# Patient Record
Sex: FEMALE | Race: ASIAN | NOT HISPANIC OR LATINO | Employment: FULL TIME | URBAN - METROPOLITAN AREA
[De-identification: names, ages, dates, MRNs, and addresses within clinical notes are randomized per-mention and may not be internally consistent; named-entity substitution may affect disease eponyms.]

---

## 2017-03-08 ENCOUNTER — TELEPHONE (OUTPATIENT)
Dept: INTERNAL MEDICINE | Facility: CLINIC | Age: 57
End: 2017-03-08

## 2017-03-08 DIAGNOSIS — Z13.220 SCREENING, LIPID: ICD-10-CM

## 2017-03-08 DIAGNOSIS — Z00.00 ANNUAL PHYSICAL EXAM: Primary | ICD-10-CM

## 2017-03-08 DIAGNOSIS — Z13.21 ENCOUNTER FOR VITAMIN DEFICIENCY SCREENING: ICD-10-CM

## 2017-03-08 NOTE — TELEPHONE ENCOUNTER
----- Message from Irina Rincon sent at 3/8/2017  2:49 PM CST -----  Contact: self [phone]  Doctor appointment and lab have been scheduled.  Please link lab orders to the lab appointment.  Date of doctor appointment:  05/09  Physical or EP:  Physical  Date of lab appointment: 05/03   Comments:

## 2017-05-02 ENCOUNTER — LAB VISIT (OUTPATIENT)
Dept: LAB | Facility: HOSPITAL | Age: 57
End: 2017-05-02
Attending: FAMILY MEDICINE
Payer: COMMERCIAL

## 2017-05-02 DIAGNOSIS — Z13.220 SCREENING, LIPID: ICD-10-CM

## 2017-05-02 DIAGNOSIS — Z00.00 ANNUAL PHYSICAL EXAM: ICD-10-CM

## 2017-05-02 LAB
ALBUMIN SERPL BCP-MCNC: 4 G/DL
ALP SERPL-CCNC: 58 U/L
ALT SERPL W/O P-5'-P-CCNC: 21 U/L
ANION GAP SERPL CALC-SCNC: 9 MMOL/L
AST SERPL-CCNC: 24 U/L
BASOPHILS # BLD AUTO: 0.04 K/UL
BASOPHILS NFR BLD: 0.8 %
BILIRUB SERPL-MCNC: 0.7 MG/DL
BUN SERPL-MCNC: 20 MG/DL
CALCIUM SERPL-MCNC: 9.3 MG/DL
CHLORIDE SERPL-SCNC: 105 MMOL/L
CHOLEST/HDLC SERPL: 3.4 {RATIO}
CO2 SERPL-SCNC: 26 MMOL/L
CREAT SERPL-MCNC: 0.8 MG/DL
DIFFERENTIAL METHOD: ABNORMAL
EOSINOPHIL # BLD AUTO: 0.3 K/UL
EOSINOPHIL NFR BLD: 5.2 %
ERYTHROCYTE [DISTWIDTH] IN BLOOD BY AUTOMATED COUNT: 12.8 %
EST. GFR  (AFRICAN AMERICAN): >60 ML/MIN/1.73 M^2
EST. GFR  (NON AFRICAN AMERICAN): >60 ML/MIN/1.73 M^2
GLUCOSE SERPL-MCNC: 93 MG/DL
HCT VFR BLD AUTO: 40.2 %
HDL/CHOLESTEROL RATIO: 29.3 %
HDLC SERPL-MCNC: 184 MG/DL
HDLC SERPL-MCNC: 54 MG/DL
HGB BLD-MCNC: 13.6 G/DL
LDLC SERPL CALC-MCNC: 97.2 MG/DL
LYMPHOCYTES # BLD AUTO: 2.5 K/UL
LYMPHOCYTES NFR BLD: 49.5 %
MCH RBC QN AUTO: 31.6 PG
MCHC RBC AUTO-ENTMCNC: 33.8 %
MCV RBC AUTO: 93 FL
MONOCYTES # BLD AUTO: 0.3 K/UL
MONOCYTES NFR BLD: 6.4 %
NEUTROPHILS # BLD AUTO: 1.9 K/UL
NEUTROPHILS NFR BLD: 38.1 %
NONHDLC SERPL-MCNC: 130 MG/DL
PLATELET # BLD AUTO: 241 K/UL
PMV BLD AUTO: 9.4 FL
POTASSIUM SERPL-SCNC: 4.1 MMOL/L
PROT SERPL-MCNC: 7.6 G/DL
RBC # BLD AUTO: 4.31 M/UL
SODIUM SERPL-SCNC: 140 MMOL/L
T4 FREE SERPL-MCNC: 0.93 NG/DL
TRIGL SERPL-MCNC: 164 MG/DL
TSH SERPL DL<=0.005 MIU/L-ACNC: 4.29 UIU/ML
WBC # BLD AUTO: 5.03 K/UL

## 2017-05-02 PROCEDURE — 84443 ASSAY THYROID STIM HORMONE: CPT

## 2017-05-02 PROCEDURE — 84439 ASSAY OF FREE THYROXINE: CPT

## 2017-05-02 PROCEDURE — 80053 COMPREHEN METABOLIC PANEL: CPT

## 2017-05-02 PROCEDURE — 85025 COMPLETE CBC W/AUTO DIFF WBC: CPT

## 2017-05-02 PROCEDURE — 80061 LIPID PANEL: CPT

## 2017-05-02 PROCEDURE — 36415 COLL VENOUS BLD VENIPUNCTURE: CPT

## 2017-05-09 ENCOUNTER — LAB VISIT (OUTPATIENT)
Dept: LAB | Facility: HOSPITAL | Age: 57
End: 2017-05-09
Attending: FAMILY MEDICINE
Payer: COMMERCIAL

## 2017-05-09 ENCOUNTER — OFFICE VISIT (OUTPATIENT)
Dept: INTERNAL MEDICINE | Facility: CLINIC | Age: 57
End: 2017-05-09
Payer: COMMERCIAL

## 2017-05-09 VITALS
HEART RATE: 69 BPM | DIASTOLIC BLOOD PRESSURE: 58 MMHG | HEIGHT: 58 IN | BODY MASS INDEX: 20.15 KG/M2 | OXYGEN SATURATION: 99 % | SYSTOLIC BLOOD PRESSURE: 102 MMHG | WEIGHT: 96 LBS

## 2017-05-09 DIAGNOSIS — R25.2 CRAMP OF BOTH LOWER EXTREMITIES: ICD-10-CM

## 2017-05-09 DIAGNOSIS — Z00.00 ANNUAL PHYSICAL EXAM: Primary | ICD-10-CM

## 2017-05-09 DIAGNOSIS — R09.82 POST-NASAL DRIP: ICD-10-CM

## 2017-05-09 DIAGNOSIS — R79.89 ELEVATED TSH: ICD-10-CM

## 2017-05-09 LAB
FERRITIN SERPL-MCNC: 237 NG/ML
FOLATE SERPL-MCNC: 14.5 NG/ML
IRON SERPL-MCNC: 144 UG/DL
RETICS/RBC NFR AUTO: 1.3 %
SATURATED IRON: 36 %
THYROPEROXIDASE IGG SERPL-ACNC: <6 IU/ML
TOTAL IRON BINDING CAPACITY: 400 UG/DL
TRANSFERRIN SERPL-MCNC: 270 MG/DL
TSH SERPL DL<=0.005 MIU/L-ACNC: 3.42 UIU/ML
VIT B12 SERPL-MCNC: 816 PG/ML

## 2017-05-09 PROCEDURE — 82728 ASSAY OF FERRITIN: CPT

## 2017-05-09 PROCEDURE — 85045 AUTOMATED RETICULOCYTE COUNT: CPT

## 2017-05-09 PROCEDURE — 86376 MICROSOMAL ANTIBODY EACH: CPT

## 2017-05-09 PROCEDURE — 36415 COLL VENOUS BLD VENIPUNCTURE: CPT

## 2017-05-09 PROCEDURE — 3074F SYST BP LT 130 MM HG: CPT | Mod: S$GLB,,, | Performed by: FAMILY MEDICINE

## 2017-05-09 PROCEDURE — 82746 ASSAY OF FOLIC ACID SERUM: CPT

## 2017-05-09 PROCEDURE — 99999 PR PBB SHADOW E&M-EST. PATIENT-LVL III: CPT | Mod: PBBFAC,,, | Performed by: FAMILY MEDICINE

## 2017-05-09 PROCEDURE — 83520 IMMUNOASSAY QUANT NOS NONAB: CPT

## 2017-05-09 PROCEDURE — 84443 ASSAY THYROID STIM HORMONE: CPT

## 2017-05-09 PROCEDURE — 82607 VITAMIN B-12: CPT

## 2017-05-09 PROCEDURE — 99396 PREV VISIT EST AGE 40-64: CPT | Mod: S$GLB,,, | Performed by: FAMILY MEDICINE

## 2017-05-09 PROCEDURE — 3078F DIAST BP <80 MM HG: CPT | Mod: S$GLB,,, | Performed by: FAMILY MEDICINE

## 2017-05-09 PROCEDURE — 83540 ASSAY OF IRON: CPT

## 2017-05-09 RX ORDER — FLUTICASONE PROPIONATE 50 MCG
1 SPRAY, SUSPENSION (ML) NASAL DAILY
Qty: 16 G | Refills: 3 | Status: SHIPPED | OUTPATIENT
Start: 2017-05-09 | End: 2017-06-26

## 2017-05-09 NOTE — PATIENT INSTRUCTIONS
Compression Socks non-prescription, mine are from [facility] pharmacy for 8 dollars a pair    Flonase    Benign Paroxysmal Positional Vertigo  Benign paroxysmal positional vertigo (BPPV) is a problem with the inner ear. The inner ear contains the vestibular system. This system is what helps you keep your balance. BPPV causes a feeling of spinning. It is a common problem of the vestibular system.  Understanding the vestibular system  The vestibular system of the ear is made up of very tiny parts. They include the utricle, saccule, and semicircular canals. The utricle is a tiny organ that contains calcium crystals. In some people, the crystals can move into the semicircular canals. When this happens, the system no longer works as it should. This causes BPPV. Benign means it is not life-threatening. Paroxysmal means it happens suddenly. Positional means that it happens when you move your head. Vertigo is a feeling of spinning.  What causes BPPV?  Causes include injury to your head or neck. Other problems with the vestibular system may cause BPPV. In many people, the cause of BPPV is not known.  Symptoms of BPPV  You many have repeated feelings of spinning (vertigo). The vertigo usually lasts less than 1 minute. Some movements, suchas rolling over in bed, can bring on vertigo.  Diagnosing BPPV  Your primary health care provider may diagnose and treat your BPPV. Or you may see an ear, nose, and throat doctor (otolaryngologist). In some cases, you may see a nervous system doctor (neurologist).  The health care provider will ask about your symptoms and your medical history. He or she will examine you. You may have hearing and balance tests. As part of the exam, your health care provider may have you move your head and body in certain ways. If you have BPPV, the movements can bring on vertigo. Your provider will also look for abnormal movements of your eyes. You may have other tests to check your vestibular or nervous  systems.  Treatment for BPPV  Your health care provider may try to move the calcium crystals. This is done by having you move your head and neck in certain ways. This treatment is safe and often works well. You may also be told to do these movements at home. You may still have vertigo for a few weeks. Your health care provider will recheck your symptoms, usually in about a month. Special physical therapy may also be part of treatment.  In rare cases surgery may be needed for BPPV that does not go away.     When to call the health care provider  Call your health care provider right away if you have any of these:  · Symptoms that do not go away with treatment  · Symptoms that get worse  · New symptoms   Date Last Reviewed: 3/19/2015  © 5361-1146 [employer]. [address] [place] [address], [place] [address]. All rights reserved. This information is not intended as a substitute for professional medical care. Always follow your healthcare professional's instructions.

## 2017-05-09 NOTE — PROGRESS NOTES
"Subjective:       Patient ID: Mercedes Perez is a 57 y.o. female.    Chief Complaint: Annual Exam    HPI 58 y/o female here for here for her annual exam  She reports lower leg pain in both legs on and off for the past few months, cannot characterize the pain, worse in the day time when she is standing at work, she works on an assembly line, it feels better when she is lying down, she had not tried anything.  She reports she for the past few months she has had trouble falling and staying asleep, she feels more tired, she notes that when she lays down or turns in bed she gets "dizzy" lasts a few seconds and resolves, denies feeling bad when it is happening, denies f/n/v/d/constipation/cp/palpitations/sob, she reports her urinary sx have improved, she has tried Melatonin and it helps sometimes  Eating regularly, she does exercise less lately, she is doing 30 min twice a week.   Eye exam due  Dental due  Vaccines: not sure if she had tdap, will get at her work if needed  GYN: mmg due in October  Scope due in 2020  dexa done at work was normal last year    Review of Systems   Constitutional: Negative for activity change, appetite change, fatigue and fever.   Respiratory: Negative for cough and shortness of breath.    Cardiovascular: Negative for chest pain, palpitations and leg swelling.   Gastrointestinal: Negative for constipation, diarrhea, nausea and vomiting.   Genitourinary: Negative for difficulty urinating and dysuria.   Skin: Negative for rash.   Neurological: Negative for dizziness and light-headedness.   Psychiatric/Behavioral: Positive for sleep disturbance.       Objective:      BP (!) 102/58  Pulse 69  Ht 4' 10" (1.473 m)  Wt 43.6 kg (96 lb 0.2 oz)  SpO2 99%  BMI 20.07 kg/m2  Physical Exam   Constitutional: She appears well-developed and well-nourished.   HENT:   Head: Normocephalic and atraumatic.   Mouth/Throat: No oropharyngeal exudate.   Neck: Normal range of motion. Neck supple. No thyromegaly " present.   Cardiovascular: Normal rate, regular rhythm and normal heart sounds.    Pulmonary/Chest: Effort normal and breath sounds normal. No respiratory distress.   Abdominal: Soft. Bowel sounds are normal. She exhibits no distension. There is no tenderness.   Musculoskeletal: She exhibits no edema.   Lymphadenopathy:     She has no cervical adenopathy.   Neurological: She is alert.   Skin: Skin is warm and dry.   Psychiatric: She has a normal mood and affect.   Nursing note and vitals reviewed.      Assessment:       1. Annual physical exam    2. Cramp of both lower extremities    3. Elevated TSH    4. Post-nasal drip        Plan:   [facility] was seen today for annual exam.    Diagnoses and all orders for this visit:    Annual physical exam    Cramp of both lower extremities  -     Iron and TIBC; Future  -     Ferritin; Future  -     Reticulocytes; Future  -     Vitamin B12; Future  -     Folate; Future    Elevated TSH  -     TSH; Future  -     THYROID PEROXIDASE ANTIBODY; Future  -     THYROTROPIN RECEPTOR ANTIBODY; Future    Post-nasal drip  -     fluticasone (FLONASE) 50 mcg/actuation nasal spray; 1 spray by Each Nare route once daily.

## 2017-05-09 NOTE — MR AVS SNAPSHOT
[facility]  [address]  [place] [address]  Phone: [phone]  Fax: [phone]                  Mercedes Perez   2017 8:30 AM   Office Visit    Description:  Female : 1960   Provider:  Natasha Deng MD   Department:  [facility]           Reason for Visit     Annual Exam           Diagnoses this Visit        Comments    Annual physical exam    -  Primary     Cramp of both lower extremities         Elevated TSH         Post-nasal drip                To Do List           Goals (5 Years of Data)     None      Follow-Up and Disposition     Return in about 1 year (around 2018), or if symptoms worsen or fail to improve.    Follow-up and Disposition History       These Medications        Disp Refills Start End    fluticasone (FLONASE) 50 mcg/actuation nasal spray 16 g 3 2017     1 spray by Each Nare route once daily. - Each Nare    Pharmacy: [facility] [address] [place] [address].  #: [phone]         [facility]     [facility] On [facility] Nurse Care Line -  Assistance  Unless otherwise directed by your provider, please contact [facility], our nurse care line that is available for  assistance.     Registered nurses in the [facility] provide: appointment scheduling, clinical advisement, health education, and other advisory services.  Call: [phone] (toll free)               Medications           Message regarding Medications     Verify the changes and/or additions to your medication regime listed below are the same as discussed with your clinician today.  If any of these changes or additions are incorrect, please notify your healthcare provider.        START taking these NEW medications        Refills    fluticasone (FLONASE) 50 mcg/actuation nasal spray 3    Si spray by Each Nare route once daily.    Class: Normal    Route: Each Nare           Verify that the below list of  "medications is an accurate representation of the medications you are currently taking.  If none reported, the list may be blank. If incorrect, please contact your healthcare provider. Carry this list with you in case of emergency.           Current Medications     conjugated estrogens (PREMARIN) vaginal cream 0.5 g with applicator or dime-sized amt with finger in vagina nightly x 2 weeks, then twice a week thereafter    fluticasone (FLONASE) 50 mcg/actuation nasal spray 1 spray by Each Nare route once daily.           Clinical Reference Information           Your Vitals Were     BP Pulse Height Weight SpO2 BMI    102/58 69 4' 10" (1.473 m) 43.6 kg (96 lb 0.2 oz) 99% 20.07 kg/m2      Blood Pressure          Most Recent Value    BP  (!)  102/58      Allergies as of 5/9/2017     No Known Drug Allergies      Immunizations Administered on Date of Encounter - 5/9/2017     None      Orders Placed During Today's Visit     Future Labs/Procedures Expected by Expires    Ferritin  5/9/2017 7/8/2018    Folate  5/9/2017 7/8/2018    Iron and TIBC  5/9/2017 7/8/2018    Reticulocytes  5/9/2017 7/8/2018    THYROID PEROXIDASE ANTIBODY  5/9/2017 7/8/2018    THYROTROPIN RECEPTOR ANTIBODY  5/9/2017 7/8/2018    Vitamin B12  5/9/2017 7/8/2018    TSH  4/9/2018 (Approximate) 7/8/2018      [facility] Sign-Up     Activating your [facility] account is as easy as 1-2-3!     1) Visit [email or web address], select Sign Up Now, enter this activation code and your date of birth, then select Next.  [email or web address]  Expires: 6/23/2017  9:13 AM      2) Create a username and password to use when you visit [facility] in the future and select a security question in case you lose your password and select Next.    3) Enter your e-mail address and click Sign Up!    Additional Information  If you have questions, please e-mail [email or web address] or call [phone] to talk to our [facility] staff. Remember, [facility] is NOT to be used for urgent needs. For medical " emergencies, dial 911.         Instructions    Compression Socks non-prescription, mine are from [facility] pharmacy for 8 dollars a pair    Flonase    Benign Paroxysmal Positional Vertigo  Benign paroxysmal positional vertigo (BPPV) is a problem with the inner ear. The inner ear contains the vestibular system. This system is what helps you keep your balance. BPPV causes a feeling of spinning. It is a common problem of the vestibular system.  Understanding the vestibular system  The vestibular system of the ear is made up of very tiny parts. They include the utricle, saccule, and semicircular canals. The utricle is a tiny organ that contains calcium crystals. In some people, the crystals can move into the semicircular canals. When this happens, the system no longer works as it should. This causes BPPV. Benign means it is not life-threatening. Paroxysmal means it happens suddenly. Positional means that it happens when you move your head. Vertigo is a feeling of spinning.  What causes BPPV?  Causes include injury to your head or neck. Other problems with the vestibular system may cause BPPV. In many people, the cause of BPPV is not known.  Symptoms of BPPV  You many have repeated feelings of spinning (vertigo). The vertigo usually lasts less than 1 minute. Some movements, suchas rolling over in bed, can bring on vertigo.  Diagnosing BPPV  Your primary health care provider may diagnose and treat your BPPV. Or you may see an ear, nose, and throat doctor (otolaryngologist). In some cases, you may see a nervous system doctor (neurologist).  The health care provider will ask about your symptoms and your medical history. He or she will examine you. You may have hearing and balance tests. As part of the exam, your health care provider may have you move your head and body in certain ways. If you have BPPV, the movements can bring on vertigo. Your provider will also look for abnormal movements of your eyes. You may have other  tests to check your vestibular or nervous systems.  Treatment for BPPV  Your health care provider may try to move the calcium crystals. This is done by having you move your head and neck in certain ways. This treatment is safe and often works well. You may also be told to do these movements at home. You may still have vertigo for a few weeks. Your health care provider will recheck your symptoms, usually in about a month. Special physical therapy may also be part of treatment.  In rare cases surgery may be needed for BPPV that does not go away.     When to call the health care provider  Call your health care provider right away if you have any of these:  · Symptoms that do not go away with treatment  · Symptoms that get worse  · New symptoms   Date Last Reviewed: 3/19/2015  © 8824-5231 [employer]. [address] [place] [address], [place] [address]. All rights reserved. This information is not intended as a substitute for professional medical care. Always follow your healthcare professional's instructions.             Language Assistance Services     ATTENTION: Language assistance services are available, free of charge. Please call [phone].      ATENCIÓN: Si mulula mariajose, tiene a curry disposición servicios gratuitos de asistencia lingüística. Llame al [phone].     JENNIFER Ý: N?u b?n nói Ti?ng Vi?t, có các d?ch v? h? tr? ngôn ng? mi?n phí dành cho b?n. G?i s? [phone].         [facility] complies with applicable Federal civil rights laws and does not discriminate on the basis of race, color, national origin, age, disability, or sex.

## 2017-05-10 LAB — TSH RECEP AB SER-ACNC: <1 IU/L

## 2017-05-11 ENCOUNTER — TELEPHONE (OUTPATIENT)
Dept: INTERNAL MEDICINE | Facility: CLINIC | Age: 57
End: 2017-05-11

## 2017-05-11 NOTE — LETTER
May 12, 2017    Mercedes Perez  [address] [place] [address]             [facility]  [address] [facility] [place] [address]  Phone: [phone]  Fax: [phone] Dear Ms. Mercedes Perez:    Below are the results from your recent visit:    Resulted Orders   CBC auto differential   Result Value Ref Range    WBC 5.03 3.90 - 12.70 K/uL    RBC 4.31 4.00 - 5.40 M/uL    Hemoglobin 13.6 12.0 - 16.0 g/dL    Hematocrit 40.2 37.0 - 48.5 %    MCV 93 82 - 98 fL    MCH 31.6 (H) 27.0 - 31.0 pg    MCHC 33.8 32.0 - 36.0 %    RDW 12.8 11.5 - 14.5 %    Platelets 241 150 - 350 K/uL    MPV 9.4 9.2 - 12.9 fL    Gran # 1.9 1.8 - 7.7 K/uL    Lymph # 2.5 1.0 - 4.8 K/uL    Mono # 0.3 0.3 - 1.0 K/uL    Eos # 0.3 0.0 - 0.5 K/uL    Baso # 0.04 0.00 - 0.20 K/uL    Gran% 38.1 38.0 - 73.0 %    Lymph% 49.5 (H) 18.0 - 48.0 %    Mono% 6.4 4.0 - 15.0 %    Eosinophil% 5.2 0.0 - 8.0 %    Basophil% 0.8 0.0 - 1.9 %    Differential Method Automated    Comprehensive metabolic panel   Result Value Ref Range    Sodium 140 136 - 145 mmol/L    Potassium 4.1 3.5 - 5.1 mmol/L    Chloride 105 95 - 110 mmol/L    CO2 26 23 - 29 mmol/L    Glucose 93 70 - 110 mg/dL    BUN, Bld 20 6 - 20 mg/dL    Creatinine 0.8 0.5 - 1.4 mg/dL    Calcium 9.3 8.7 - 10.5 mg/dL    Total Protein 7.6 6.0 - 8.4 g/dL    Albumin 4.0 3.5 - 5.2 g/dL    Total Bilirubin 0.7 0.1 - 1.0 mg/dL      Comment:      For infants and newborns, interpretation of results should be based  on gestational age, weight and in agreement with clinical  observations.  Premature Infant recommended reference ranges:  Up to 24 hours.............<8.0 mg/dL  Up to 48 hours............<12.0 mg/dL  3-5 days..................<15.0 mg/dL  6-29 days.................<15.0 mg/dL      Alkaline Phosphatase 58 55 - 135 U/L    AST 24 10 - 40 U/L    ALT 21 10 - 44 U/L    Anion Gap 9 8 - 16 mmol/L    eGFR if African American >60.0 >60 mL/min/1.73 m^2    eGFR if non African American >60.0 >60 mL/min/1.73  m^2      Comment:      Calculation used to obtain the estimated glomerular filtration  rate (eGFR) is the CKD-EPI equation. Since race is unknown   in our information system, the eGFR values for   -American and Non--American patients are given   for each creatinine result.     Lipid panel   Result Value Ref Range    Cholesterol 184 120 - 199 mg/dL      Comment:      The National Cholesterol Education Program (NCEP) has set the  following guidelines (reference ranges) for Cholesterol:  Optimal.....................<200 mg/dL  Borderline High.............200-239 mg/dL  High........................> or = 240 mg/dL      Triglycerides 164 (H) 30 - 150 mg/dL      Comment:      The National Cholesterol Education Program (NCEP) has set the  following guidelines (reference values) for triglycerides:  Normal......................<150 mg/dL  Borderline High.............150-199 mg/dL  High........................200-499 mg/dL      HDL 54 40 - 75 mg/dL      Comment:      The National Cholesterol Education Program (NCEP) has set the  following guidelines (reference values) for HDL Cholesterol:  Low...............<40 mg/dL  Optimal...........>60 mg/dL      LDL Cholesterol 97.2 63.0 - 159.0 mg/dL      Comment:      The National Cholesterol Education Program (NCEP) has set the  following guidelines (reference values) for LDL Cholesterol:  Optimal.......................<130 mg/dL  Borderline High...............130-159 mg/dL  High..........................160-189 mg/dL  Very High.....................>190 mg/dL      HDL/Chol Ratio 29.3 20.0 - 50.0 %    Total Cholesterol/HDL Ratio 3.4 2.0 - 5.0    Non-HDL Cholesterol 130 mg/dL      Comment:      Risk category and Non-HDL cholesterol goals:  Coronary heart disease (CHD)or equivalent (10-year risk of CHD >20%):  Non-HDL cholesterol goal     <130 mg/dL  Two or more CHD risk factors and 10-year risk of CHD <= 20%:  Non-HDL cholesterol goal     <160 mg/dL  0 to 1 CHD risk  factor:  Non-HDL cholesterol goal     <190 mg/dL     TSH   Result Value Ref Range    TSH 4.295 (H) 0.400 - 4.000 uIU/mL   T4, free   Result Value Ref Range    Free T4 0.93 0.71 - 1.51 ng/dL     Your results are normal.  Please don't hesitate to call our office if you have any questions or concerns.      Sincerely,        Estrella Sim

## 2017-05-12 ENCOUNTER — TELEPHONE (OUTPATIENT)
Dept: INTERNAL MEDICINE | Facility: CLINIC | Age: 57
End: 2017-05-12

## 2017-05-12 NOTE — TELEPHONE ENCOUNTER
----- Message from Junior Carranza sent at 5/12/2017 12:03 PM CDT -----  Contact: self/ [phone] home  Type: Returning a call    Who left a message? Estrella    When did the practice call? 05/12/2017    Comments: Pt returned call and told that the lab results will be mailed to the home address.  Please call and advise.    Thank you

## 2017-06-26 ENCOUNTER — OFFICE VISIT (OUTPATIENT)
Dept: OBSTETRICS AND GYNECOLOGY | Facility: CLINIC | Age: 57
End: 2017-06-26
Attending: OBSTETRICS & GYNECOLOGY
Payer: COMMERCIAL

## 2017-06-26 VITALS
HEIGHT: 58 IN | BODY MASS INDEX: 19.9 KG/M2 | SYSTOLIC BLOOD PRESSURE: 98 MMHG | WEIGHT: 94.81 LBS | DIASTOLIC BLOOD PRESSURE: 60 MMHG

## 2017-06-26 DIAGNOSIS — Z12.31 VISIT FOR SCREENING MAMMOGRAM: ICD-10-CM

## 2017-06-26 DIAGNOSIS — Z01.419 WELL WOMAN EXAM WITH ROUTINE GYNECOLOGICAL EXAM: Primary | ICD-10-CM

## 2017-06-26 PROCEDURE — 99999 PR PBB SHADOW E&M-EST. PATIENT-LVL III: CPT | Mod: PBBFAC,,, | Performed by: OBSTETRICS & GYNECOLOGY

## 2017-06-26 PROCEDURE — 87624 HPV HI-RISK TYP POOLED RSLT: CPT

## 2017-06-26 PROCEDURE — 88175 CYTOPATH C/V AUTO FLUID REDO: CPT

## 2017-06-26 PROCEDURE — 99396 PREV VISIT EST AGE 40-64: CPT | Mod: S$GLB,,, | Performed by: OBSTETRICS & GYNECOLOGY

## 2017-06-26 NOTE — PROGRESS NOTES
SUBJECTIVE:   57 y.o. female   for annual routine Pap and checkup. No LMP recorded. Patient is postmenopausal..  She has no unusual complaints.        Past Medical History:   Diagnosis Date    Allergy     UI (urinary incontinence)     Uterine fibroid      History reviewed. No pertinent surgical history.  Social History     Social History    Marital status:      Spouse name: N/A    Number of children: N/A    Years of education: N/A     Occupational History    Not on file.     Social History Main Topics    Smoking status: Never Smoker    Smokeless tobacco: Never Used    Alcohol use No    Drug use: No    Sexual activity: Not Currently     Birth control/ protection: Post-menopausal     Other Topics Concern    Not on file     Social History Narrative    No narrative on file     Family History   Problem Relation Age of Onset    Hypertension Mother     Diabetes Maternal Grandmother     Coronary artery disease Paternal Aunt     No Known Problems Father     No Known Problems Sister     No Known Problems Brother     No Known Problems Maternal Aunt     No Known Problems Maternal Uncle     No Known Problems Paternal Uncle     No Known Problems Maternal Grandfather     No Known Problems Paternal Grandmother     No Known Problems Paternal Grandfather     Amblyopia Neg Hx     Blindness Neg Hx     Cancer Neg Hx     Cataracts Neg Hx     Glaucoma Neg Hx     Macular degeneration Neg Hx     Retinal detachment Neg Hx     Strabismus Neg Hx     Stroke Neg Hx     Thyroid disease Neg Hx     Melanoma Neg Hx      OB History    Para Term  AB Living   3       1     SAB TAB Ectopic Multiple Live Births   1              # Outcome Date GA Lbr Zachary/2nd Weight Sex Delivery Anes PTL Lv   3 SAB            2     3.629 kg (8 lb)  Vag-Spont      1     3.685 kg (8 lb 2 oz)  Vag-Spont               No current outpatient prescriptions on file.     No current facility-administered  "medications for this visit.      Allergies: No known drug allergies     ROS:  Constitutional: no weight loss, weight gain, fever, fatigue  Eyes:  No vision changes, glasses/contacts  ENT/Mouth: No ulcers, sinus problems, ears ringing, headache  Cardiovascular: No inability to lie flat, chest pain, exercise intolerance, swelling, heart palpitations  Respiratory: No wheezing, coughing blood, shortness of breath, or cough  Gastrointestinal: No diarrhea, bloody stool, nausea/vomiting, constipation, gas, hemorrhoids  Genitourinary: No blood in urine, painful urination, urgency of urination, frequency of urination, incomplete emptying, incontinence, abnormal bleeding, painful periods, heavy periods, vaginal discharge, vaginal odor, painful intercourse, sexual problems, bleeding after intercourse.  Musculoskeletal: No muscle weakness  Skin/Breast: No painful breasts, nipple discharge, masses, rash, ulcers  Neurological: No passing out, seizures, numbness, headache  Endocrine: No diabetes, hypothyroid, hyperthyroid, hot flashes, hair loss, abnormal hair growth, ance  Psychiatric: No depression, crying  Hematologic: No bruises, bleeding, swollen lymph nodes, anemia.      OBJECTIVE:   The patient appears well, alert, oriented x 3, in no distress.  BP 98/60   Ht 4' 10" (1.473 m)   Wt 43 kg (94 lb 12.8 oz)   BMI 19.81 kg/m²   NECK: no thyromegaly, trachea midline  SKIN: no acne, striae, hirsutism  BREAST EXAM: breasts appear normal, no suspicious masses, no skin or nipple changes or axillary nodes  ABDOMEN: no hernias, masses, or hepatosplenomegaly  GENITALIA: normal external genitalia, no erythema, no discharge  URETHRA: normal urethra, normal urethral meatus  VAGINA: Normal  CERVIX: no lesions or cervical motion tenderness  UTERUS: normal size, contour, position, consistency, mobility, non-tender  ADNEXA: no mass, fullness, tenderness    \  ASSESSMENT:   .[facility] was seen today for well woman.    Diagnoses and all orders for " this visit:    Well woman exam with routine gynecological exam  -     Liquid-based pap smear, screening  -     HPV High Risk Genotypes, PCR    Visit for screening mammogram  -     Mammo Digital Screening Bilat with CAD; Future

## 2017-06-29 LAB
HPV HR 12 DNA CVX QL NAA+PROBE: NEGATIVE
HPV16 DNA SPEC QL NAA+PROBE: NEGATIVE
HPV18 DNA SPEC QL NAA+PROBE: NEGATIVE

## 2017-10-23 ENCOUNTER — HOSPITAL ENCOUNTER (OUTPATIENT)
Dept: RADIOLOGY | Facility: HOSPITAL | Age: 57
Discharge: HOME | End: 2017-10-23
Attending: OBSTETRICS & GYNECOLOGY
Payer: COMMERCIAL

## 2017-10-23 VITALS — HEIGHT: 58 IN | WEIGHT: 94 LBS | BODY MASS INDEX: 19.73 KG/M2

## 2017-10-23 DIAGNOSIS — Z12.31 VISIT FOR SCREENING MAMMOGRAM: ICD-10-CM

## 2017-10-23 PROCEDURE — 77067 SCR MAMMO BI INCL CAD: CPT | Mod: TC

## 2017-10-23 PROCEDURE — 77067 SCR MAMMO BI INCL CAD: CPT | Mod: 26,,, | Performed by: RADIOLOGY

## 2017-10-23 PROCEDURE — 77063 BREAST TOMOSYNTHESIS BI: CPT | Mod: 26,,, | Performed by: RADIOLOGY

## 2018-04-06 ENCOUNTER — TELEPHONE (OUTPATIENT)
Dept: UROGYNECOLOGY | Facility: CLINIC | Age: 58
End: 2018-04-06

## 2018-04-06 NOTE — TELEPHONE ENCOUNTER
Spoke to pt and scheduled her for Dr. Trejo's next available and informed her I'd send an appointment letter in the mail. Pt voiced understanding and call ended.

## 2018-04-06 NOTE — TELEPHONE ENCOUNTER
----- Message from Alyssia Frederick sent at 4/6/2018  8:09 AM CDT -----  Contact: self  Pt needing a call back, she is needing an appt, pt can be reached at [phone].

## 2018-04-11 ENCOUNTER — OFFICE VISIT (OUTPATIENT)
Dept: UROGYNECOLOGY | Facility: CLINIC | Age: 58
End: 2018-04-11
Payer: COMMERCIAL

## 2018-04-11 VITALS
SYSTOLIC BLOOD PRESSURE: 110 MMHG | WEIGHT: 94.38 LBS | HEIGHT: 58 IN | DIASTOLIC BLOOD PRESSURE: 70 MMHG | BODY MASS INDEX: 19.81 KG/M2

## 2018-04-11 DIAGNOSIS — R39.15 URGENCY OF URINATION: ICD-10-CM

## 2018-04-11 DIAGNOSIS — N95.2 VAGINAL ATROPHY: ICD-10-CM

## 2018-04-11 DIAGNOSIS — N39.46 MIXED INCONTINENCE URGE AND STRESS (MALE)(FEMALE): ICD-10-CM

## 2018-04-11 DIAGNOSIS — R35.1 NOCTURIA: ICD-10-CM

## 2018-04-11 DIAGNOSIS — N81.11 MIDLINE CYSTOCELE: Primary | ICD-10-CM

## 2018-04-11 PROCEDURE — 3074F SYST BP LT 130 MM HG: CPT | Mod: CPTII,S$GLB,, | Performed by: OBSTETRICS & GYNECOLOGY

## 2018-04-11 PROCEDURE — 99999 PR PBB SHADOW E&M-EST. PATIENT-LVL III: CPT | Mod: PBBFAC,,, | Performed by: OBSTETRICS & GYNECOLOGY

## 2018-04-11 PROCEDURE — 3078F DIAST BP <80 MM HG: CPT | Mod: CPTII,S$GLB,, | Performed by: OBSTETRICS & GYNECOLOGY

## 2018-04-11 PROCEDURE — 99214 OFFICE O/P EST MOD 30 MIN: CPT | Mod: S$GLB,,, | Performed by: OBSTETRICS & GYNECOLOGY

## 2018-04-11 RX ORDER — ATOVAQUONE AND PROGUANIL HYDROCHLORIDE 250; 100 MG/1; MG/1
TABLET, FILM COATED ORAL
COMMUNITY
Start: 2018-02-06 | End: 2018-05-10

## 2018-04-11 NOTE — PATIENT INSTRUCTIONS
1)  Mixed urinary incontinence, urge > stress:    --send urine for C&S  --Empty bladder every 3 hours.  Empty well: wait a minute, lean forward on toilet.    --Avoid dietary irritants (see sheet).  Keep diary x 3-5 days to determine your irritants.  --start pelvic floor PT  --URGE: consider medication in future.  Takes 2-4 weeks to see if will have effect.  For dry mouth: get sour, sugar free lozenge or gum.    --STRESS:  Pessary vs. Sling.     2)  Post-void urgency:  --send urine for C&S  --start pelvic floor PT.  Call in a few days to make appt:  Ghada or Ellen ([facility]).  (p) [phone].   --consider contribution of cystocele (PVR normal today on US)      3)  Vaginal atrophy (dryness):  Mild  --CTM for now; consider restarting estrogen in future (didn't help urinary U/F)  --use water-based lubrication with intercourse if needed    4)  Stage 2 cystocele:  --asymptomatic except for urinary U/F  --continue to monitor for now  --consider contribution to urinary symptoms if not better    5)  Nocturia (nighttime urination): stop fluids 2 hours before bed.  If have leg swelling:  Elevate feet above chest x 1 hour before bed to get excess fluid off.  Can also use support hose (knee highs).    --start pelvic floor PT    6)  RTC 3 months.

## 2018-04-11 NOTE — PROGRESS NOTES
Urogyn follow up  2018    [facility] [address] [place] [address]    Mercedes Nanette Perez  [id number]  1960      Mercedes Perez is a 58 y.o.  here for a urogyn follow up.  Last visit .     1)  UI:  (+) CASSANDRA (rare) > (+) UUI  X years.  Both uncommon though.  (--) pads.  No underwear changes.  Daytime frequency: Q 1-4 hours (increases with coffee).  Nocturia: Yes: 1/night.   (--) dysuria,  (--) hematuria,  (--) frequent UTIs.  (--) complete bladder emptying, worse at night. With small PV volume, increased PV urgency.   Took medication a few years ago for UI, which helped, but then she started to have trouble emptying.  Then, she stopped.      2)  POP:  Absent.   (--) vaginal bleeding. (--) vaginal discharge. (+) sexually active.  (+) dyspareunia, ? From dryness.  (+)  Vaginal dryness.  (--) vaginal estrogen use.   --POP-Q:  Aa 0; Ba 0 (only with major strain); C -5; Ap -1; Bp -1; D -6.  Genital hiatus 2, perineal body 2. total vaginal length 9-10.    3)  BM:  (--) constipation/straining.  (--) chronic diarrhea. (--) hematochezia.  (--) fecal incontinence.  (--) fecal smearing/urgency.  (--) incomplete evacuation.     Past Medical History  Past Medical History:   Diagnosis Date    Allergy     UI (urinary incontinence)     Uterine fibroid         Past Surgical History  No past surgical history on file.     Hysterectomy: No    Past Ob History     x 2.  C/s x 0.    Largest infant weight: 8#2oz.   no FAVD. yes episiotomy.      Gynecologic History  LMP: No LMP recorded. Patient is postmenopausal.  Age of menarche: 14 y.o.   Age of menopause: 50 y.o.   Menstrual history: h/o normal.    Last pap: , normal. No h/o abnl paps. No h/o STDs.   Last mammogram: , normal.  Reports had with company per [facility] 2015.    Colonoscopy: , normal.  Repeat due .    DEXA: reports normal  with [employer] work eval.    Issues include:  Patient Active Problem List  "  Diagnosis    Insomnia    Vaginal atrophy    Mixed incontinence urge and stress (male)(female)    Urgency of urination    Nocturia    Midline cystocele       History since last visit:    1)  Mixed urinary incontinence, urge > stress:    --nocturia: 2-3x/PM  --send urine for C&S  --CASSANDRA has improved; still has some UUI; no pad use    2)  Post-void urgency:  --send urine for C&S  --follow instructions per #1  --consider contribution of cystocele (PVR normal today but looks cloudy)  --start vaginal estrogen    3)  Vaginal atrophy (dryness):  Not using estrogen cream anymore.     4)  Stage 1-2 cystocele:  --feels that she can see more now--no symptoms  --has some trouble emptying bladder; PV urgency with small volume 2nd void  --continue to monitor for now    5)  Nocturia (nighttime urination): 2-3x/PM.      Medications:    Current Outpatient Prescriptions:     atovaquone-proguanil (MALARONE) 250-100 mg Tab, , Disp: , Rfl:     ROS:  As per HPI.      Exam  /70   Ht 4' 10" (1.473 m)   Wt 42.8 kg (94 lb 5.7 oz)   BMI 19.72 kg/m²   General: alert and oriented, no acute distress  Respiratory: normal respiratory effort  Abd: soft, non-tender, non-distended    Pelvic  Ext. Genitalia: normal external genitalia. Normal bartholin's and skenes glands  Vagina: + atrophy. Normal vaginal mucosa without lesions. No discharge noted.   Non-tender bladder base without palpable mass.  Cervix: no lesions  Uterus:  uterus is normal size, shape, consistency and nontender   Urethra: no masses or tenderness  Urethral meatus: no lesions, caruncle or prolapse.    --POP-Q:  Aa +0.5; Ba +0.5; C -1; Ap -1; Bp -1; D -6.  Genital hiatus 2, perineal body 2. total vaginal length 9-10.    --PVR 40-50 mL    Impression  1. Midline cystocele     2. Mixed incontinence urge and stress (male)(female)     3. Nocturia     4. Urgency of urination     5. Vaginal atrophy       We reviewed the above issues and discussed options for short-term versus " long-term management of her problems.   Plan:       1)  Mixed urinary incontinence, urge > stress:    --send urine for C&S  --Empty bladder every 3 hours.  Empty well: wait a minute, lean forward on toilet.    --Avoid dietary irritants (see sheet).  Keep diary x 3-5 days to determine your irritants.  --start pelvic floor PT  --URGE: consider medication in future.  Takes 2-4 weeks to see if will have effect.  For dry mouth: get sour, sugar free lozenge or gum.    --STRESS:  Pessary vs. Sling.     2)  Post-void urgency:  --send urine for C&S  --start pelvic floor PT.  Call in a few days to make appt:  Ghada or Ellen ([facility]).  (p) [phone].   --consider contribution of cystocele (PVR normal today on US)      3)  Vaginal atrophy (dryness):  Mild  --CTM for now; consider restarting estrogen in future (didn't help urinary U/F)  --use water-based lubrication with intercourse if needed    4)  Stage 2 cystocele:  --asymptomatic except for urinary U/F  --continue to monitor for now  --consider contribution to urinary symptoms if not better    5)  Nocturia (nighttime urination): stop fluids 2 hours before bed.  If have leg swelling:  Elevate feet above chest x 1 hour before bed to get excess fluid off.  Can also use support hose (knee highs).    --start pelvic floor PT    6)  RTC 3 months.     40 minutes were spent in face to face time with this patient  90 % of this time was spent in counseling and/or coordination of care     Priya Trejo MD  [facility]  Division of Female Pelvic Medicine and Reconstructive Surgery  Department of Obstetrics & Gynecology

## 2018-05-04 ENCOUNTER — CLINICAL SUPPORT (OUTPATIENT)
Dept: REHABILITATION | Facility: HOSPITAL | Age: 58
End: 2018-05-04
Attending: OBSTETRICS & GYNECOLOGY
Payer: COMMERCIAL

## 2018-05-04 DIAGNOSIS — R39.15 URGENCY OF URINATION: ICD-10-CM

## 2018-05-04 DIAGNOSIS — R35.1 NOCTURIA: ICD-10-CM

## 2018-05-04 DIAGNOSIS — N39.46 MIXED INCONTINENCE URGE AND STRESS (MALE)(FEMALE): Primary | ICD-10-CM

## 2018-05-04 DIAGNOSIS — N81.89 PELVIC FLOOR WEAKNESS: ICD-10-CM

## 2018-05-04 DIAGNOSIS — N81.11 MIDLINE CYSTOCELE: ICD-10-CM

## 2018-05-04 PROCEDURE — 97112 NEUROMUSCULAR REEDUCATION: CPT | Mod: PN

## 2018-05-04 PROCEDURE — 97161 PT EVAL LOW COMPLEX 20 MIN: CPT | Mod: PN

## 2018-05-04 NOTE — PROGRESS NOTES
[id number]    Date of treatment: 05/04/2018   Time in: 8:01  Time out: 8:50  # Visits: 1/12  Auth: [id number]  Referral expiration: 12/31/18  POC expiration: 7/27/18    Outpatient Physical Therapy   Initial Evaluation    Mercedes is a 58 y.o. female evaluated on 05/04/2018    Physician:  Priya Trejo MD   Diagnosis:   Encounter Diagnoses   Name Primary?    Mixed incontinence urge and stress (male)(female) Yes    Urgency of urination     Nocturia     Midline cystocele         Treatment ordered: PT    Medical History:   Past Medical History:   Diagnosis Date    Allergy     UI (urinary incontinence)     Uterine fibroid         Surgical History: No past surgical history on file.     Medications:   Current Outpatient Prescriptions   Medication Sig    atovaquone-proguanil (MALARONE) 250-100 mg Tab      No current facility-administered medications for this visit.        Allergies:   Review of patient's allergies indicates:   Allergen Reactions    No known drug allergies       Precautions: universal   OB/GYN History: 3 pregnancies, 1 miscarriage, episiotomy, 31 and 29 years old, 8.2 lbs, irregular (longer) cycle  Bladder/Bowel History: leakage       Barriers to Learning: ESL  Educational/Spiritual/Cultural needs: none  Environmental Barriers: none noted  Abuse/Neglect: no signs  Nutritional Status: well developed well nourished  Fall Risk: none    Subjective     Pt states Dr. Trejo wants her to do PT because her uterus is prolapsed. She says it affects her bladder which is also prolapsed. She says she has to use the restroom often. Found out 5-6 months ago, she looked with a mirror. She says her doctor noticed it before this. Pt reports that frequency/prolapse does not interfere too much in her life except she feels limited in sex, by about 50%.    Pain: Patient reports 0/10 with 0 being the lowest and 10 being the highest. Some pressure    Pain or lack of sensation with vaginal/pelvic  exam? Pain with sex  Sexually active? Not much    Frequency of Urination: Daytime: every hour but every 15-30 min before bed        Nighttime: 2-3    Urine Stream: medium    Bladder Leakage: not now, had some 5-6 years ago    Do you have difficulty initiating your urine stream? yes  Do you feel like you are emptying completely? Yes, but she says when she gets up she has to go right back    Frequency of Bowel Movements: 1-2/day    Do you have difficulty initiating a bowel movement? no  Stool consistency? Soft, formed    Types/amount of Fluid Intake: water (4-5 cups/day), 1 C coffee, green tea  Current Exercise: not currently, 1-2 times/week walking    Occupation: Pt works at a [employer]; lots of standing.  Living situation?      Patient's Goals: decrease frequency    Objective     ORTHO SCREEN  Posture: decreased lordosis   Pelvic Alignment: no deviations noted in supine    ABDOMINALS  Scarring: none      Diastasis: none   Abdominal strength: Rectus: WFL     Transverse: palpable       VAGINAL PELVIC FLOOR EXAM  EXTERNAL ASSESSMENT  Skin Condition: WNL  Scarring: none  Sensation: WNL  Pain: none  Introitus: WNL   Voluntary Contraction: mini lift, unable to hold  Voluntary Relaxation: reflex tightening, followed by drop  Bearing down: present, anterior vaginal wall weakness      INTERNAL ASSESSMENT  Pain: none  Sensation: able to localize pressure appropriately, able to distinguish pressure from side to side, and able to feel the difference between lift and drop    Vaginal Vault: stenotic  Muscle Bulk: atrophy  Muscle Power: 2/5  Muscle Endurance: NA  # Reps To Fatigue: NA    Fast Contractions: NA    Quality of Contraction: decreased hold, slow relaxation and incomplete relaxation  Specificity: patient contracts: gluts, adductors   Coordination: tends to hold breath during PFM contration/performs with inhale  Prolapse Check: Stage 2 cystocele    TREATMENT    Pt received individual neuromuscular reeducation  for 10 minutes including: diaphragmatic breathing with verbal and tactile cueing    Education: Instructed on general anatomy/physiology of urinary/bowel system and PF function using pelvic model; discussed plan of care with patient; instructed in purpose of physical therapy and the  benefits/risks of treatment; instructed in risks of refusing treatment. Patient agreed to treatment plan. Pt was instructed in HEP including diaphragmatic breathing and legs in a chair; she verbalized understanding and was provided with a handout.     Assessment     Mercedes is a 58 y.o. female referred to outpatient physical therapy with a medical diagnosis of mixed UI, nocturia, and urinary urgency. Pt presents today with signs and symptoms consistent with referring diagnosis including pelvic floor dysfunction (difficulty releasing PFM, weakness, decreased endurance and coordination). Pt presents with decrease core strength and stability. Pt will benefit from physcial therapy services in order to maximize pain free functional independence. The following goals were discussed with the patient and patient is in agreement with them as to be addressed in the treatment plan. Pt was given a HEP as described above. Pt verbally understood the instructions as they were given and demonstrated proper form and technique during therapy. Pt was advise to perform these exercises free of pain and to stop performing them if pain occurs. Will follow up on HEP, instruct patient in self abdominal care, and continue with PFM retraining. Administer bladder diary.    Prognosis: good  No educational, cultural, or spiritual needs identified.    History  Co-morbidities and personal factors that may impact the plan of care Examination  Body Structures and Functions, activity limitations and participation restrictions that may impact the plan of care    Clinical Presentation   Co-morbidities:   UI  Episiotomy    Personal Factors:    Body Regions:   Pelvis, abdomen,  trunk    Body Systems:    Neuromuscular, musculoskeletal      Participation Restrictions:        Activity limitations:   Learning and applying knowledge  no deficits    General Tasks and Commands  no deficits    Communication  no deficits    Mobility  no deficits    Self care  no deficits    Domestic Life  no deficits    Interactions/Relationships  intimate relationships    Life Areas  no deficits    Community and Social Life  no deficits         stable and uncomplicated                      low   low  moderate Decision Making/ Complexity Score:  low     GOALS  Short Term Goals: 4 weeks (6/1/18)  1. Pt will verbalize improved awareness of PFM activity as palpated by PT in order to improve activity involvement with HEP.  2. Pt will demonstrate decreased overflow muscle activity during PF muscle contraction.  3. Pt will be able to integrate inner core musculature in order to improve central stabilization and decrease urinary frequency.  4. Pt will tolerate HEP to improve impairments and independence with ADL's.    Long Term Goals: 12 weeks (7/27/18)  1. Pt will be able to perform 10 x 5'' kegals in order to improve core strength and stability and minimize risk of prolapse progression.  2. Pt will complete bladder diary in order to complete bladder retraining as necessary.  3. Pt will report improvement in urinary frequency.  4. Pt will be independent with HEP and self management.    Plan     Pt will be treated by physical therapy 1 time a week for 3 months for Pt Education, HEP, therapeutic exercises, neuromuscular re-education, therapeutic activity, gait training, manual therapy, and modalities (including SEMG) PRN to achieve established goals.

## 2018-05-04 NOTE — PLAN OF CARE
[id number]    Date of treatment: 05/04/2018   Time in: 8:01  Time out: 8:50  # Visits: 1/12  Auth: [id number]  Referral expiration: 12/31/18  POC expiration: 7/27/18    Outpatient Physical Therapy   Initial Evaluation    Mercedes is a 58 y.o. female evaluated on 05/04/2018    Physician:  Priya Trejo MD   Diagnosis:   Encounter Diagnoses   Name Primary?    Mixed incontinence urge and stress (male)(female) Yes    Urgency of urination     Nocturia     Midline cystocele         Treatment ordered: PT    Medical History:   Past Medical History:   Diagnosis Date    Allergy     UI (urinary incontinence)     Uterine fibroid         Surgical History: No past surgical history on file.     Medications:   Current Outpatient Prescriptions   Medication Sig    atovaquone-proguanil (MALARONE) 250-100 mg Tab      No current facility-administered medications for this visit.        Allergies:   Review of patient's allergies indicates:   Allergen Reactions    No known drug allergies       Precautions: universal   OB/GYN History: 3 pregnancies, 1 miscarriage, episiotomy, 31 and 29 years old, 8.2 lbs, irregular (longer) cycle  Bladder/Bowel History: leakage       Barriers to Learning: ESL  Educational/Spiritual/Cultural needs: none  Environmental Barriers: none noted  Abuse/Neglect: no signs  Nutritional Status: well developed well nourished  Fall Risk: none    Subjective     Pt states Dr. Trjeo wants her to do PT because her uterus is prolapsed. She says it affects her bladder which is also prolapsed. She says she has to use the restroom often. Found out 5-6 months ago, she looked with a mirror. She says her doctor noticed it before this. Pt reports that frequency/prolapse does not interfere too much in her life except she feels limited in sex, by about 50%.    Pain: Patient reports 0/10 with 0 being the lowest and 10 being the highest. Some pressure    Pain or lack of sensation with vaginal/pelvic  exam? Pain with sex  Sexually active? Not much    Frequency of Urination: Daytime: every hour but every 15-30 min before bed        Nighttime: 2-3    Urine Stream: medium    Bladder Leakage: not now, had some 5-6 years ago    Do you have difficulty initiating your urine stream? yes  Do you feel like you are emptying completely? Yes, but she says when she gets up she has to go right back    Frequency of Bowel Movements: 1-2/day    Do you have difficulty initiating a bowel movement? no  Stool consistency? Soft, formed    Types/amount of Fluid Intake: water (4-5 cups/day), 1 C coffee, green tea  Current Exercise: not currently, 1-2 times/week walking    Occupation: Pt works at a [employer]; lots of standing.  Living situation?      Patient's Goals: decrease frequency    Objective     ORTHO SCREEN  Posture: decreased lordosis   Pelvic Alignment: no deviations noted in supine    ABDOMINALS  Scarring: none      Diastasis: none   Abdominal strength: Rectus: WFL     Transverse: palpable       VAGINAL PELVIC FLOOR EXAM  EXTERNAL ASSESSMENT  Skin Condition: WNL  Scarring: none  Sensation: WNL  Pain: none  Introitus: WNL   Voluntary Contraction: mini lift, unable to hold  Voluntary Relaxation: reflex tightening, followed by drop  Bearing down: present, anterior vaginal wall weakness      INTERNAL ASSESSMENT  Pain: none  Sensation: able to localize pressure appropriately, able to distinguish pressure from side to side, and able to feel the difference between lift and drop    Vaginal Vault: stenotic  Muscle Bulk: atrophy  Muscle Power: 2/5  Muscle Endurance: NA  # Reps To Fatigue: NA    Fast Contractions: NA    Quality of Contraction: decreased hold, slow relaxation and incomplete relaxation  Specificity: patient contracts: gluts, adductors   Coordination: tends to hold breath during PFM contration/performs with inhale  Prolapse Check: Stage 2 cystocele    TREATMENT    Pt received individual neuromuscular reeducation  for 10 minutes including: diaphragmatic breathing with verbal and tactile cueing    Education: Instructed on general anatomy/physiology of urinary/bowel system and PF function using pelvic model; discussed plan of care with patient; instructed in purpose of physical therapy and the  benefits/risks of treatment; instructed in risks of refusing treatment. Patient agreed to treatment plan. Pt was instructed in HEP including diaphragmatic breathing and legs in a chair; she verbalized understanding and was provided with a handout.     Assessment     Mercedes is a 58 y.o. female referred to outpatient physical therapy with a medical diagnosis of mixed UI, nocturia, and urinary urgency. Pt presents today with signs and symptoms consistent with referring diagnosis including pelvic floor dysfunction (difficulty releasing PFM, weakness, decreased endurance and coordination). Pt presents with decrease core strength and stability. Pt will benefit from physcial therapy services in order to maximize pain free functional independence. The following goals were discussed with the patient and patient is in agreement with them as to be addressed in the treatment plan. Pt was given a HEP as described above. Pt verbally understood the instructions as they were given and demonstrated proper form and technique during therapy. Pt was advise to perform these exercises free of pain and to stop performing them if pain occurs. Will follow up on HEP, instruct patient in self abdominal care, and continue with PFM retraining. Administer bladder diary.    Prognosis: good  No educational, cultural, or spiritual needs identified.    History  Co-morbidities and personal factors that may impact the plan of care Examination  Body Structures and Functions, activity limitations and participation restrictions that may impact the plan of care    Clinical Presentation   Co-morbidities:   UI  Episiotomy    Personal Factors:    Body Regions:   Pelvis, abdomen,  trunk    Body Systems:    Neuromuscular, musculoskeletal      Participation Restrictions:        Activity limitations:   Learning and applying knowledge  no deficits    General Tasks and Commands  no deficits    Communication  no deficits    Mobility  no deficits    Self care  no deficits    Domestic Life  no deficits    Interactions/Relationships  intimate relationships    Life Areas  no deficits    Community and Social Life  no deficits         stable and uncomplicated                      low   low  moderate Decision Making/ Complexity Score:  low     GOALS  Short Term Goals: 4 weeks (6/1/18)  1. Pt will verbalize improved awareness of PFM activity as palpated by PT in order to improve activity involvement with HEP.  2. Pt will demonstrate decreased overflow muscle activity during PF muscle contraction.  3. Pt will be able to integrate inner core musculature in order to improve central stabilization and decrease urinary frequency.  4. Pt will tolerate HEP to improve impairments and independence with ADL's.    Long Term Goals: 12 weeks (7/27/18)  1. Pt will be able to perform 10 x 5'' kegals in order to improve core strength and stability and minimize risk of prolapse progression.  2. Pt will complete bladder diary in order to complete bladder retraining as necessary.  3. Pt will report improvement in urinary frequency.  4. Pt will be independent with HEP and self management.    Plan     Pt will be treated by physical therapy 1 time a week for 3 months for Pt Education, HEP, therapeutic exercises, neuromuscular re-education, therapeutic activity, gait training, manual therapy, and modalities (including SEMG) PRN to achieve established goals.

## 2018-05-10 ENCOUNTER — OFFICE VISIT (OUTPATIENT)
Dept: INTERNAL MEDICINE | Facility: CLINIC | Age: 58
End: 2018-05-10
Payer: COMMERCIAL

## 2018-05-10 VITALS
WEIGHT: 95 LBS | BODY MASS INDEX: 19.94 KG/M2 | HEART RATE: 72 BPM | OXYGEN SATURATION: 99 % | HEIGHT: 58 IN | SYSTOLIC BLOOD PRESSURE: 126 MMHG | DIASTOLIC BLOOD PRESSURE: 78 MMHG

## 2018-05-10 DIAGNOSIS — Z13.820 SCREENING FOR OSTEOPOROSIS: ICD-10-CM

## 2018-05-10 DIAGNOSIS — Z13.6 ENCOUNTER FOR LIPID SCREENING FOR CARDIOVASCULAR DISEASE: ICD-10-CM

## 2018-05-10 DIAGNOSIS — Z78.0 POST-MENOPAUSAL: ICD-10-CM

## 2018-05-10 DIAGNOSIS — H60.543 DERMATITIS OF EAR CANAL, BILATERAL: ICD-10-CM

## 2018-05-10 DIAGNOSIS — Z00.00 ANNUAL PHYSICAL EXAM: Primary | ICD-10-CM

## 2018-05-10 DIAGNOSIS — Z13.220 ENCOUNTER FOR LIPID SCREENING FOR CARDIOVASCULAR DISEASE: ICD-10-CM

## 2018-05-10 PROCEDURE — 3078F DIAST BP <80 MM HG: CPT | Mod: CPTII,S$GLB,, | Performed by: FAMILY MEDICINE

## 2018-05-10 PROCEDURE — 3074F SYST BP LT 130 MM HG: CPT | Mod: CPTII,S$GLB,, | Performed by: FAMILY MEDICINE

## 2018-05-10 PROCEDURE — 99999 PR PBB SHADOW E&M-EST. PATIENT-LVL III: CPT | Mod: PBBFAC,,, | Performed by: FAMILY MEDICINE

## 2018-05-10 PROCEDURE — 99396 PREV VISIT EST AGE 40-64: CPT | Mod: S$GLB,,, | Performed by: FAMILY MEDICINE

## 2018-05-10 RX ORDER — FLUOCINOLONE ACETONIDE 0.11 MG/ML
5 OIL AURICULAR (OTIC) DAILY PRN
Qty: 20 ML | Refills: 0 | Status: SHIPPED | OUTPATIENT
Start: 2018-05-10 | End: 2019-05-30 | Stop reason: SDUPTHER

## 2018-05-10 RX ORDER — PSYLLIUM HUSK 0.4 G
CAPSULE ORAL DAILY
COMMUNITY

## 2018-05-10 NOTE — PROGRESS NOTES
"Subjective:       Patient ID: Mercedes Perez is a 58 y.o. female.    Chief Complaint: Annual Exam    HPI  57 y/o female here for here for her annual exam.  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  Sleeping ok.  Denies f/n/v/heartburn/d/constipation/cp/sob. Appetite good, eating fairly healthy.  Mixed incontinence/cystocele: doing pelvic flood PT  Eye exam utd 1/2018 normal  Dental utd  Vaccines: not sure if she had tdap, will get at her work if needed  GYN: mmg due in October  Scope due in 2020  Would like bone density  OTC: calcium and D    Review of Systems   Constitutional: Negative for activity change, appetite change, fatigue and fever.   Respiratory: Negative for cough and shortness of breath.    Cardiovascular: Negative for chest pain, palpitations and leg swelling.   Gastrointestinal: Negative for constipation, diarrhea, nausea and vomiting.   Genitourinary: Negative for difficulty urinating and dysuria.   Skin: Negative for rash.   Neurological: Negative for dizziness and light-headedness.   Psychiatric/Behavioral: Negative for sleep disturbance.       Objective:      /78   Pulse 72   Ht 4' 10" (1.473 m)   Wt 43.1 kg (95 lb 0.3 oz)   SpO2 99%   BMI 19.86 kg/m²   Physical Exam   Constitutional: She appears well-developed and well-nourished.   HENT:   Head: Normocephalic and atraumatic.   Right Ear: Tympanic membrane normal.   Left Ear: Tympanic membrane normal.   Mouth/Throat: No oropharyngeal exudate.   Bilateral ear canal dermatitis   Neck: Normal range of motion. Neck supple. No thyromegaly present.   Cardiovascular: Normal rate, regular rhythm and normal heart sounds.    Pulmonary/Chest: Effort normal and breath sounds normal. No respiratory distress.   Abdominal: Soft. Bowel sounds are normal. She exhibits no distension. There is no tenderness.   Musculoskeletal: She exhibits no edema.   Lymphadenopathy:     She has no cervical adenopathy.   Neurological: " She is alert.   Skin: Skin is warm and dry.   Psychiatric: She has a normal mood and affect.   Nursing note and vitals reviewed.      Assessment:       1. Annual physical exam    2. Encounter for lipid screening for cardiovascular disease    3. Post-menopausal    4. Screening for osteoporosis    5. Dermatitis of ear canal, bilateral        Plan:   Mercedes was seen today for annual exam.    Diagnoses and all orders for this visit:    Annual physical exam  -     Comprehensive metabolic panel; Future  -     CBC auto differential; Future  -     Hemoglobin A1c; Future  -     Lipid panel; Future  -     TSH; Future  -     Urinalysis; Future    Encounter for lipid screening for cardiovascular disease  -     Lipid panel; Future    Post-menopausal  -     DXA Bone Density Spine And Hip; Future    Screening for osteoporosis  -     DXA Bone Density Spine And Hip; Future    Dermatitis of ear canal, bilateral  -     fluocinolone acetonide oil 0.01 % Drop; Place 5 drops in ear(s) daily as needed.

## 2018-05-14 ENCOUNTER — LAB VISIT (OUTPATIENT)
Dept: LAB | Facility: HOSPITAL | Age: 58
End: 2018-05-14
Attending: FAMILY MEDICINE
Payer: COMMERCIAL

## 2018-05-14 DIAGNOSIS — Z13.220 ENCOUNTER FOR LIPID SCREENING FOR CARDIOVASCULAR DISEASE: ICD-10-CM

## 2018-05-14 DIAGNOSIS — Z13.6 ENCOUNTER FOR LIPID SCREENING FOR CARDIOVASCULAR DISEASE: ICD-10-CM

## 2018-05-14 DIAGNOSIS — Z00.00 ANNUAL PHYSICAL EXAM: ICD-10-CM

## 2018-05-14 LAB
ALBUMIN SERPL BCP-MCNC: 4.1 G/DL
ALP SERPL-CCNC: 65 U/L
ALT SERPL W/O P-5'-P-CCNC: 24 U/L
ANION GAP SERPL CALC-SCNC: 9 MMOL/L
AST SERPL-CCNC: 24 U/L
BASOPHILS # BLD AUTO: 0.05 K/UL
BASOPHILS NFR BLD: 0.8 %
BILIRUB SERPL-MCNC: 0.6 MG/DL
BUN SERPL-MCNC: 23 MG/DL
CALCIUM SERPL-MCNC: 9.5 MG/DL
CHLORIDE SERPL-SCNC: 109 MMOL/L
CHOLEST SERPL-MCNC: 196 MG/DL
CHOLEST/HDLC SERPL: 2.9 {RATIO}
CO2 SERPL-SCNC: 24 MMOL/L
CREAT SERPL-MCNC: 0.7 MG/DL
DIFFERENTIAL METHOD: ABNORMAL
EOSINOPHIL # BLD AUTO: 0.3 K/UL
EOSINOPHIL NFR BLD: 4.2 %
ERYTHROCYTE [DISTWIDTH] IN BLOOD BY AUTOMATED COUNT: 13.3 %
EST. GFR  (AFRICAN AMERICAN): >60 ML/MIN/1.73 M^2
EST. GFR  (NON AFRICAN AMERICAN): >60 ML/MIN/1.73 M^2
ESTIMATED AVG GLUCOSE: 114 MG/DL
GLUCOSE SERPL-MCNC: 103 MG/DL
HBA1C MFR BLD HPLC: 5.6 %
HCT VFR BLD AUTO: 40.9 %
HDLC SERPL-MCNC: 68 MG/DL
HDLC SERPL: 34.7 %
HGB BLD-MCNC: 13.7 G/DL
LDLC SERPL CALC-MCNC: 111.8 MG/DL
LYMPHOCYTES # BLD AUTO: 3 K/UL
LYMPHOCYTES NFR BLD: 47.2 %
MCH RBC QN AUTO: 31.4 PG
MCHC RBC AUTO-ENTMCNC: 33.5 G/DL
MCV RBC AUTO: 94 FL
MONOCYTES # BLD AUTO: 0.4 K/UL
MONOCYTES NFR BLD: 6.1 %
NEUTROPHILS # BLD AUTO: 2.7 K/UL
NEUTROPHILS NFR BLD: 41.5 %
NONHDLC SERPL-MCNC: 128 MG/DL
PLATELET # BLD AUTO: 241 K/UL
PMV BLD AUTO: 9.6 FL
POTASSIUM SERPL-SCNC: 4.5 MMOL/L
PROT SERPL-MCNC: 7.4 G/DL
RBC # BLD AUTO: 4.36 M/UL
SODIUM SERPL-SCNC: 142 MMOL/L
TRIGL SERPL-MCNC: 81 MG/DL
TSH SERPL DL<=0.005 MIU/L-ACNC: 2.89 UIU/ML
WBC # BLD AUTO: 6.38 K/UL

## 2018-05-14 PROCEDURE — 36415 COLL VENOUS BLD VENIPUNCTURE: CPT

## 2018-05-14 PROCEDURE — 80061 LIPID PANEL: CPT

## 2018-05-14 PROCEDURE — 80053 COMPREHEN METABOLIC PANEL: CPT

## 2018-05-14 PROCEDURE — 85025 COMPLETE CBC W/AUTO DIFF WBC: CPT

## 2018-05-14 PROCEDURE — 83036 HEMOGLOBIN GLYCOSYLATED A1C: CPT

## 2018-05-14 PROCEDURE — 84443 ASSAY THYROID STIM HORMONE: CPT

## 2018-05-17 ENCOUNTER — HOSPITAL ENCOUNTER (OUTPATIENT)
Dept: RADIOLOGY | Facility: CLINIC | Age: 58
Discharge: HOME | End: 2018-05-17
Attending: FAMILY MEDICINE
Payer: COMMERCIAL

## 2018-05-17 DIAGNOSIS — Z78.0 POST-MENOPAUSAL: ICD-10-CM

## 2018-05-17 DIAGNOSIS — Z13.820 SCREENING FOR OSTEOPOROSIS: ICD-10-CM

## 2018-05-17 PROCEDURE — 77080 DXA BONE DENSITY AXIAL: CPT | Mod: TC

## 2018-05-17 PROCEDURE — 77080 DXA BONE DENSITY AXIAL: CPT | Mod: 26,,, | Performed by: INTERNAL MEDICINE

## 2018-05-21 ENCOUNTER — CLINICAL SUPPORT (OUTPATIENT)
Dept: REHABILITATION | Facility: HOSPITAL | Age: 58
End: 2018-05-21
Attending: OBSTETRICS & GYNECOLOGY
Payer: COMMERCIAL

## 2018-05-21 DIAGNOSIS — N81.89 PELVIC FLOOR WEAKNESS: ICD-10-CM

## 2018-05-21 DIAGNOSIS — R39.15 URGENCY OF URINATION: ICD-10-CM

## 2018-05-21 DIAGNOSIS — N39.46 MIXED INCONTINENCE URGE AND STRESS (MALE)(FEMALE): Primary | ICD-10-CM

## 2018-05-21 DIAGNOSIS — R35.1 NOCTURIA: ICD-10-CM

## 2018-05-21 PROCEDURE — 97112 NEUROMUSCULAR REEDUCATION: CPT | Mod: PN

## 2018-05-21 PROCEDURE — 97110 THERAPEUTIC EXERCISES: CPT | Mod: PN

## 2018-05-21 NOTE — PATIENT INSTRUCTIONS
Pelvic floor activation with diaphragmatic breathin. Lie comfortably with your hips elevated on a few pillows. Take a few deep breaths to relax completely - relax the legs, inner thighs, gluts, and pelvic floor.     2. Take a deep breath in through your nose, feel your belly and ribs expand and your pelvic floor muscles relax.     3. As you exhale, gently squeeze and lift your pelvic floor muscles as if trying not to pee (i.e. kegal).    4. Continue for 10 breaths. Inhale to relax your vaginal/pelvic floor muscles. Exhale to engage.

## 2018-05-21 NOTE — PROGRESS NOTES
Patient: Mercedes Fitzpatrick [facility] #: [id number]   Diagnosis:   Encounter Diagnoses   Name Primary?    Pelvic floor weakness     Mixed incontinence urge and stress (male)(female) Yes    Urgency of urination     Nocturia       Date of start of care: 5/4/18  Date of treatment: 05/21/2018   Time in: 8:00  Time out: 8:40  Total treatment time: 40 minutes  # Visits: 2/12  Auth expiration: 12/31/18  POC expiration: 7/27/18  Outpatient Physical Therapy   Daily Note    Subjective     Pt states she is doing well today. Nothing new to report.    Pain: Current: 0/10     Patient's Goals: decrease frequency    Objective     TREATMENT    Pt received individual therapeutic exercise for 25 minutes including: instructions in self abdominal care with hand over hand instructions provided    Pt received individual neuromuscular reeducation for 15 minutes including:     - Diaphragmatic breathing with verbal tactile cueing, good return demonstration, modifications provided  - PFM activation with DB; pt displays improvement since eval with good coordination with core breath and complete derecruitment    Education: Discussed progression of plan of care with patient; educated pt in activity modification; pt instructed to continue with diaphragmatic breathing and legs in a chair with addition of abdominal self care and PFM activation with DB with hips elevated; pt demonstrated and verbalized understanding and was provided with a handout. Administered bladder diary.    Assessment     Pt tolerated session well without visible adverse effects. Pt with improved PFM relaxation and coordination today. Will continue to progress kegals and core work as appropriate. Pt presents to PFPT with POP, decreased PFM strength, decreased central stabilization and postural deficits. Pt will continue to benefit from skilled PT intervention to maximize pain free functional independence.  Review bladder diary and self care, progress therapeutic exercise.    Pt is  making good progress towards established goals.  No educational, cultural, or spiritual barriers to learning identified.    GOALS  Short Term Goals: 4 weeks (6/1/18)  1. Pt will verbalize improved awareness of PFM activity as palpated by PT in order to improve activity involvement with HEP.  2. Pt will demonstrate decreased overflow muscle activity during PF muscle contraction.  3. Pt will be able to integrate inner core musculature in order to improve central stabilization and decrease urinary frequency.  4. Pt will tolerate HEP to improve impairments and independence with ADL's.     Long Term Goals: 12 weeks (7/27/18)  1. Pt will be able to perform 10 x 5'' kegals in order to improve core strength and stability and minimize risk of prolapse progression.  2. Pt will complete bladder diary in order to complete bladder retraining as necessary.  3. Pt will report improvement in urinary frequency.  4. Pt will be independent with HEP and self management    Plan     Continue with established POC, working toward PT goals.

## 2018-05-31 ENCOUNTER — CLINICAL SUPPORT (OUTPATIENT)
Dept: REHABILITATION | Facility: HOSPITAL | Age: 58
End: 2018-05-31
Attending: OBSTETRICS & GYNECOLOGY
Payer: COMMERCIAL

## 2018-05-31 DIAGNOSIS — N81.89 PELVIC FLOOR WEAKNESS: ICD-10-CM

## 2018-05-31 DIAGNOSIS — N39.46 MIXED INCONTINENCE URGE AND STRESS (MALE)(FEMALE): Primary | ICD-10-CM

## 2018-05-31 DIAGNOSIS — N81.11 MIDLINE CYSTOCELE: ICD-10-CM

## 2018-05-31 DIAGNOSIS — R39.15 URGENCY OF URINATION: ICD-10-CM

## 2018-05-31 DIAGNOSIS — R35.1 NOCTURIA: ICD-10-CM

## 2018-05-31 PROCEDURE — 97110 THERAPEUTIC EXERCISES: CPT | Mod: PN

## 2018-05-31 NOTE — PROGRESS NOTES
[facility]   Clinic #: [id number]   Diagnosis:   Encounter Diagnoses   Name Primary?    Pelvic floor weakness     Mixed incontinence urge and stress (male)(female) Yes    Urgency of urination     Nocturia     Midline cystocele       Date of start of care: 5/4/18  Date of treatment: 05/31/2018   Time in: 4:55  Time out: 5:40  Total treatment time: 45 minutes  # Visits: 3/12  Auth expiration: 12/31/18  POC expiration: 7/27/18  Outpatient Physical Therapy   Daily Note    Subjective     Pt arrives with her diary today. She says she continues to have urinary frequency especially in the morning and before bed.    Pain: Current: 0/10     Patient's Goals: decrease frequency    Objective     TREATMENT    Pt received individual therapeutic exercise for 45 minutes including:     - Reviewed and discussed bladder diary; pt averages 15 voids/day and has decreased water, instructed to increase water, legs up before bed, no water by the bed  - Review of abdominal self care with corrections provided; pt with good return demonstration  - PFM activation with DB with SEMG assist in supine, external perianal electrodes; good return demonstration, slightly elevated baseline  - 10 x 5'' kegals in supine with SEMG assist; good WR rise, good holding, complete derecruitment to original baseline  - 10 quick flicks    Pt received individual neuromuscular reeducation for 00 minutes including:     - Diaphragmatic breathing with verbal tactile cueing, good return demonstration, modifications provided  - PFM activation with DB; pt displays improvement since eval with good coordination with core breath and complete derecruitment    Pt tolerated treatment well with no visible adverse affects. No skin irritation, errythemia, or other adverse affects observed from electrode placement.      Education: Discussed progression of plan of care with patient; educated pt in activity modification; pt instructed to continue with diaphragmatic  breathing, legs in a chair, abdominal self care and PFM activation with DB with hips elevated; added 5'' kegals, quick flicks, supine clams, and supine ball squeeze; pt demonstrated and verbalized understanding and was provided with a handout.     Assessment     Pt tolerated session well without visible adverse effects. Pt with good return demonstration of her exercises. Pt continues to present with POP, decreased PFM strength, decreased central stabilization and postural deficits. Pt will continue to benefit from skilled PT intervention to maximize pain free functional independence.     Pt is making good progress towards established goals.  No educational, cultural, or spiritual barriers to learning identified.    GOALS  Short Term Goals: 4 weeks (6/1/18)  1. Pt will verbalize improved awareness of PFM activity as palpated by PT in order to improve activity involvement with HEP.  2. Pt will demonstrate decreased overflow muscle activity during PF muscle contraction.  3. Pt will be able to integrate inner core musculature in order to improve central stabilization and decrease urinary frequency.  4. Pt will tolerate HEP to improve impairments and independence with ADL's. Met 5/31/18     Long Term Goals: 12 weeks (7/27/18)  1. Pt will be able to perform 10 x 5'' kegals in order to improve core strength and stability and minimize risk of prolapse progression.  2. Pt will complete bladder diary in order to complete bladder retraining as necessary.  3. Pt will report improvement in urinary frequency.  4. Pt will be independent with HEP and self management    CMS Impairment/Limitation/Restriction for PFDI Prolapse Survey  Status Limitation G-Code CMS Severity Modifier  Intake 0% 0%  5/31/2018 4% 4% Current Status CI - At least 1 percent but less than 20 percent     Plan     Continue with established POC, working toward PT goals.

## 2018-06-07 ENCOUNTER — CLINICAL SUPPORT (OUTPATIENT)
Dept: REHABILITATION | Facility: HOSPITAL | Age: 58
End: 2018-06-07
Attending: OBSTETRICS & GYNECOLOGY
Payer: COMMERCIAL

## 2018-06-07 DIAGNOSIS — N81.89 PELVIC FLOOR WEAKNESS: ICD-10-CM

## 2018-06-07 DIAGNOSIS — R35.1 NOCTURIA: ICD-10-CM

## 2018-06-07 DIAGNOSIS — N39.46 MIXED INCONTINENCE URGE AND STRESS (MALE)(FEMALE): Primary | ICD-10-CM

## 2018-06-07 DIAGNOSIS — R39.15 URGENCY OF URINATION: ICD-10-CM

## 2018-06-07 PROCEDURE — 97112 NEUROMUSCULAR REEDUCATION: CPT | Mod: PN

## 2018-06-07 PROCEDURE — 97110 THERAPEUTIC EXERCISES: CPT | Mod: PN

## 2018-06-07 NOTE — PROGRESS NOTES
Patient: Mercedes Fitzpatrick [facility]   Clinic #: [id number]   Diagnosis:   Encounter Diagnoses   Name Primary?    Pelvic floor weakness     Mixed incontinence urge and stress (male)(female) Yes    Urgency of urination     Nocturia       Date of start of care: 5/4/18  Date of treatment: 06/07/2018   Time in: 5:06  Time out: 6:00  Total treatment time: 54 minutes  # Visits: 4/12  Auth expiration: 12/31/18  POC expiration: 7/27/18  Outpatient Physical Therapy   Daily Note    Subjective     Pt reports nothing new to report.    Pain: Current: 0/10     Patient's Goals: decrease frequency    Objective     TREATMENT    Pt received individual therapeutic exercise for 40 minutes including:     - Supine piriformis stretch on stool 3 x 5 breaths ea  - Supine squat with stool x 5 breaths  - Supine clams, RTB 20 x 5''  - Supine ball squeeze 20 x 5''  - Sit to stand squat with core breath x 10    Not performed:  - Reviewed and discussed bladder diary; pt averages 15 voids/day and has decreased water, instructed to increase water, legs up before bed, no water by the bed  - Review of abdominal self care with corrections provided; pt with good return demonstration  - PFM activation with DB with SEMG assist in supine, external perianal electrodes; good return demonstration, slightly elevated baseline  - 10 x 5'' kegals in supine with SEMG assist; good WR rise, good holding, complete derecruitment to original baseline  - 10 quick flicks    Pt received individual neuromuscular reeducation for 14 minutes including:     - Body scan mindfulness    Not performed:  - Diaphragmatic breathing with verbal tactile cueing, good return demonstration, modifications provided  - PFM activation with DB; pt displays improvement since eval with good coordination with core breath and complete derecruitment    Pt tolerated treatment well with no visible adverse affects. No skin irritation, errythemia, or other adverse affects observed from electrode  placement.      Education: Discussed progression of plan of care with patient; educated pt in activity modification; pt instructed to continue with diaphragmatic breathing, legs in a chair, abdominal self care, PFM activation with DB with hips elevated, 5'' kegals, quick flicks, supine clams, and supine ball squeeze; pt demonstrated and verbalized understanding and was provided with a handout.     Assessment     Pt tolerated session well without visible adverse effects. Pt with good return demonstration of her exercises. Pt continues to present with POP, decreased PFM strength, decreased central stabilization and postural deficits. Pt will continue to benefit from skilled PT intervention to maximize pain free functional independence.     Pt is making good progress towards established goals.  No educational, cultural, or spiritual barriers to learning identified.    GOALS  Short Term Goals: 4 weeks (6/1/18)  1. Pt will verbalize improved awareness of PFM activity as palpated by PT in order to improve activity involvement with HEP.  2. Pt will demonstrate decreased overflow muscle activity during PF muscle contraction.  3. Pt will be able to integrate inner core musculature in order to improve central stabilization and decrease urinary frequency.  4. Pt will tolerate HEP to improve impairments and independence with ADL's. Met 5/31/18     Long Term Goals: 12 weeks (7/27/18)  1. Pt will be able to perform 10 x 5'' kegals in order to improve core strength and stability and minimize risk of prolapse progression.  2. Pt will complete bladder diary in order to complete bladder retraining as necessary.  3. Pt will report improvement in urinary frequency.  4. Pt will be independent with HEP and self management    CMS Impairment/Limitation/Restriction for PFDI Prolapse Survey  Status Limitation G-Code CMS Severity Modifier  Intake 0% 0%  5/31/2018 4% 4% Current Status CI - At least 1 percent but less than 20 percent     Plan      Continue with established POC, working toward PT goals.

## 2018-06-14 ENCOUNTER — CLINICAL SUPPORT (OUTPATIENT)
Dept: REHABILITATION | Facility: HOSPITAL | Age: 58
End: 2018-06-14
Attending: OBSTETRICS & GYNECOLOGY
Payer: COMMERCIAL

## 2018-06-14 DIAGNOSIS — N81.89 PELVIC FLOOR WEAKNESS: ICD-10-CM

## 2018-06-14 PROCEDURE — 97110 THERAPEUTIC EXERCISES: CPT | Mod: PN

## 2018-06-14 NOTE — PROGRESS NOTES
Patient: Mercedes Fitzpatrick [facility] #: [id number]   Diagnosis:   Encounter Diagnosis   Name Primary?    Pelvic floor weakness       Date of start of care: 5/4/18  Date of treatment: 06/14/2018   Time in: 5:05  Time out: 4:45  Total treatment time: 40 minutes  # Visits: 5/12  Auth expiration: 12/31/18  POC expiration: 7/27/18  Outpatient Physical Therapy   Daily Note    Subjective     Pt reports things are getting better, not totally improved yet.    Pain: Current: 0/10     Patient's Goals: decrease frequency    Objective     TREATMENT    Pt received individual therapeutic exercise for 40 minutes including:     - 10 x 5'' kegals, good return demonstration, use of overflow  - 10 quick flicks; good decrease in use of overflow following verbal cueing  - Diaphragmatic breathing  - Supine clams 10 x 5''  - Supine ball squeeze 10 x 5''  - Supine piriformis stretch (stool) 3 x 5 breaths ea  - Supine adductor stretch with stool x 5 breaths  - Supine march with TA activation x 10 ea  - Sit to stand squat with core breath x 10    Not performed:  - Reviewed and discussed bladder diary; pt averages 15 voids/day and has decreased water, instructed to increase water, legs up before bed, no water by the bed  - Review of abdominal self care with corrections provided; pt with good return demonstration  - PFM activation with DB with SEMG assist in supine, external perianal electrodes; good return demonstration, slightly elevated baseline  - 10 x 5'' kegals in supine with SEMG assist; good WR rise, good holding, complete derecruitment to original baseline  - 10 quick flicks    Pt received individual neuromuscular reeducation for 00 minutes including:     - Body scan mindfulness  - PFM activation with DB; pt displays improvement since eval with good coordination with core breath and complete derecruitment    Pt tolerated treatment well with no visible adverse affects. No skin irritation, errythemia, or other adverse affects observed from  electrode placement.    Education: Discussed progression of plan of care with patient; educated pt in activity modification; pt instructed to continue with diaphragmatic breathing, legs in a chair, abdominal self care, PFM activation with DB with hips elevated, 5'' kegals, quick flicks, supine clams, and supine ball squeeze, sit to stand squat, piriformis stretch, and supine march; pt demonstrated and verbalized understanding and was provided with a handout.     Assessment     Pt tolerated session well without visible adverse effects. Pt continues with good compliance and good carry over of activities. Will follow up post follow up with urogyn. Pt expected to continue to make good progress with HEP. Pt continues to present with POP, decreased PFM strength, decreased central stabilization and postural deficits. Pt will continue to benefit from skilled PT intervention to maximize pain free functional independence.    Pt is making good progress towards established goals.  No educational, cultural, or spiritual barriers to learning identified.    GOALS  Short Term Goals: 4 weeks (6/1/18)  1. Pt will verbalize improved awareness of PFM activity as palpated by PT in order to improve activity involvement with HEP. Met  2. Pt will demonstrate decreased overflow muscle activity during PF muscle contraction. Met with verbal cueing  3. Pt will be able to integrate inner core musculature in order to improve central stabilization and decrease urinary frequency. Continue   4. Pt will tolerate HEP to improve impairments and independence with ADL's. Met      Long Term Goals: 12 weeks (7/27/18)5  1. Pt will be able to perform 10 x 5'' kegals in order to improve core strength and stability and minimize risk of prolapse progression. Met  2. Pt will complete bladder diary in order to complete bladder retraining as necessary. Met  3. Pt will report improvement in urinary frequency. Partially met  4. Pt will be independent with HEP and  self management    CMS Impairment/Limitation/Restriction for PFDI Prolapse Survey  Status Limitation G-Code CMS Severity Modifier  Intake 0% 0%  5/31/2018 4% 4% Current Status CI - At least 1 percent but less than 20 percent     Plan     Continue with established POC, working toward PT goals.

## 2018-07-18 ENCOUNTER — OFFICE VISIT (OUTPATIENT)
Dept: UROGYNECOLOGY | Facility: CLINIC | Age: 58
End: 2018-07-18
Payer: COMMERCIAL

## 2018-07-18 VITALS
DIASTOLIC BLOOD PRESSURE: 60 MMHG | BODY MASS INDEX: 20.27 KG/M2 | SYSTOLIC BLOOD PRESSURE: 100 MMHG | WEIGHT: 96.56 LBS | HEIGHT: 58 IN

## 2018-07-18 DIAGNOSIS — N39.46 MIXED INCONTINENCE URGE AND STRESS (MALE)(FEMALE): ICD-10-CM

## 2018-07-18 DIAGNOSIS — Z78.0 POST-MENOPAUSAL: ICD-10-CM

## 2018-07-18 DIAGNOSIS — N81.89 PELVIC FLOOR WEAKNESS: ICD-10-CM

## 2018-07-18 DIAGNOSIS — Z12.31 ENCOUNTER FOR SCREENING MAMMOGRAM FOR BREAST CANCER: Primary | ICD-10-CM

## 2018-07-18 DIAGNOSIS — N95.2 VAGINAL ATROPHY: ICD-10-CM

## 2018-07-18 DIAGNOSIS — R39.15 URGENCY OF URINATION: ICD-10-CM

## 2018-07-18 DIAGNOSIS — R35.1 NOCTURIA: ICD-10-CM

## 2018-07-18 PROCEDURE — 99999 PR PBB SHADOW E&M-EST. PATIENT-LVL III: CPT | Mod: PBBFAC,,, | Performed by: OBSTETRICS & GYNECOLOGY

## 2018-07-18 PROCEDURE — 3078F DIAST BP <80 MM HG: CPT | Mod: CPTII,S$GLB,, | Performed by: OBSTETRICS & GYNECOLOGY

## 2018-07-18 PROCEDURE — 3008F BODY MASS INDEX DOCD: CPT | Mod: CPTII,S$GLB,, | Performed by: OBSTETRICS & GYNECOLOGY

## 2018-07-18 PROCEDURE — 99213 OFFICE O/P EST LOW 20 MIN: CPT | Mod: S$GLB,,, | Performed by: OBSTETRICS & GYNECOLOGY

## 2018-07-18 PROCEDURE — 3074F SYST BP LT 130 MM HG: CPT | Mod: CPTII,S$GLB,, | Performed by: OBSTETRICS & GYNECOLOGY

## 2018-07-18 RX ORDER — CHLORHEXIDINE GLUCONATE ORAL RINSE 1.2 MG/ML
SOLUTION DENTAL
Refills: 1 | COMMUNITY
Start: 2018-06-19 | End: 2018-07-18 | Stop reason: ALTCHOICE

## 2018-07-18 RX ORDER — DOXYCYCLINE HYCLATE 20 MG
TABLET ORAL
Refills: 0 | COMMUNITY
Start: 2018-06-12 | End: 2018-07-18 | Stop reason: ALTCHOICE

## 2018-07-18 RX ORDER — HYDROCODONE BITARTRATE AND ACETAMINOPHEN 5; 325 MG/1; MG/1
TABLET ORAL
Refills: 0 | COMMUNITY
Start: 2018-06-11 | End: 2018-07-18

## 2018-07-18 RX ORDER — FLURBIPROFEN 100 MG/1
TABLET, FILM COATED ORAL
Refills: 0 | COMMUNITY
Start: 2018-06-11 | End: 2019-05-13

## 2018-07-18 NOTE — PATIENT INSTRUCTIONS
What is Sleep Hygiene?  Sleep hygiene is the term used to describe good sleep habits.  Considerable research has gone into developing a set of  guidelines and tips which are designed to enhance good  sleeping, and there is much evidence to suggest that these  strategies can provide long-term solutions to sleep difficulties.  There are many medications which are used to treat insomnia,  but these tend to be only effective in the short-term. Ongoing  use of sleeping pills may lead to dependence and interfere  with developing good sleep habits independent of medication,  thereby prolonging sleep difficulties. Talk to your health  professional about what is right for you, but we recommend  good sleep hygiene as an important part of treating insomnia,  either with other strategies such as medication or cognitive  therapy or alone.  Sleep Hygiene Tips  1) Get regular. One of the best ways to train your body to  sleep well is to go to bed and get up at more or less the  same time every day, even on weekends and days off! This  regular rhythm will make you feel better and will give your  body something to work from.  2) Sleep when sleepy. Only try to sleep when you actually  feel tired or sleepy, rather than spending too much time  awake in bed.  3) Get up & try again. If you havent been able to get to  sleep after about 20 minutes or more, get up and do  something calming or boring until you feel sleepy, then  return to bed and try again. Sit quietly on the couch with  the lights off (bright light will tell your brain that it is time  to wake up), or read something boring like the phone  book. Avoid doing anything that is too stimulating or  interesting, as this will wake you up even more.  4) Avoid caffeine & nicotine. It is best to avoid consuming  any caffeine (in coffee, tea, cola drinks, chocolate, and  some medications) or nicotine (cigarettes) for at least 4-6  hours before going to bed. These substances act  as  stimulants and interfere with the ability to fall asleep  5) Avoid alcohol. It is also best to avoid  alcohol for at least 4-6 hours before going to  bed. Many people believe that alcohol is  relaxing and helps them to get to sleep at  first, but it actually interrupts the quality of  sleep.  6) Bed is for sleeping. Try not to use your bed  for anything other than sleeping and sex, so that your body  comes to associate bed with sleep. If you use bed as a  place to watch TV, eat, read, work on your laptop, pay  bills, and other things, your body will not learn this  connection.  [facility] nterventions  PsychotherapyResearchTraining  7) No naps. It is best to avoid taking naps  during the day, to make sure that you  are tired at bedtime. If you cant make it  through the day without a nap, make  sure it is for less than an hour and  before 3pm.  8) Sleep rituals. You can develop your own rituals of things to  remind your body that it is time to sleep - some people find  it useful to do relaxing stretches or breathing exercises for  15 minutes before bed each night, or sit calmly with a cup of  caffeine-free tea.  9) Bathtime. Having a hot bath 1-2 hours before bedtime can  be useful, as it will raise your body temperature, causing you  to feel sleepy as your body temperature drops again.  Research shows that sleepiness is associated with a drop in  body temperature.  10) No clock-watching. Many people who struggle with sleep  tend to watch the clock too much. Frequently checking the  clock during the night can wake you up (especially if you turn  on the light to read the time) and reinforces negative  thoughts such as Oh no, look how late it is, Ill never get to  sleep or its so early, I have only slept for 5 hours, this is  terrible.  11) Use a sleep diary. This worksheet can be a useful way of  making sure you have the right facts about your sleep, rather  than making assumptions. Because a  diary involves watching  the clock (see point 10) it is a good idea to only use it for  two weeks to get an idea of what is going and then  perhaps two months down the track to see how you  are progressing.  12) Exercise. Regular exercise is a good idea to  help with good sleep, but try not to do strenuous  exercise in the 4 hours before bedtime. Morning  walks are a great way to start the day feeling refreshed!  13) Eat right. A healthy, balanced diet will help you to sleep  well, but timing is important. Some people find that a very  empty stomach at bedtime is distracting, so it can be useful  to have a light snack, but a heavy meal soon before bed can  also interrupt sleep. Some people recommend a warm glass  of milk, which contains tryptophan, which acts as a natural  sleep inducer.  14) The right space. It is very important that your bed and  bedroom are quiet and comfortable for sleeping. A cooler  room with enough blankets to stay warm is best, and make  sure you have curtains or an eyemask to block out early  morning light and earplugs if there is noise outside your  room.  15) Keep daytime routine the same. Even if you have a bad  night sleep and are tired it is important that you try to keep  your daytime activities the same as you had planned. That is,  dont avoid activities because you feel tired. This can  reinforce the insomnia.  -----------------------------------------------------    1)  Mixed urinary incontinence, urge > stress:    --Empty bladder every 3 hours.  Empty well: wait a minute, lean forward on toilet.    --Avoid dietary irritants (see sheet).  Keep diary x 3-5 days to determine your irritants.  --continue PT exercises at home; make final PT appoinment  --URGE: consider medication in future.  Takes 2-4 weeks to see if will have effect.  For dry mouth: get sour, sugar free lozenge or gum.    --STRESS:  Pessary vs. Sling.     2)  Post-void urgency:  --send urine for C&S  --continue pelvic  floor PT  --consider contribution of cystocele (PVR normal today on US)    3)  Vaginal atrophy (dryness):  Mild  --CTM for now; consider restarting estrogen in future (didn't help urinary U/F)  --use water-based lubrication with intercourse if needed    4)  Stage 2 cystocele:  --asymptomatic except for urinary U/F  --continue to monitor for now  --consider contribution to urinary symptoms if not better    5)  Nocturia (nighttime urination): stop fluids 2 hours before bed/no water by the bed.  If have leg swelling:  Elevate feet above chest x 1 hour before bed to get excess fluid off.  Can also use support hose (knee highs).    --continue PT  --work on sleep hygiene    6)  Schedule mammogram for 10/23/18 or after.      7)  RTC 1 year.

## 2018-07-18 NOTE — PROGRESS NOTES
Urogyn follow up  2018    [facility] [address] [address]    Mercedes Perez  [id number]  1960      Mercedes Perez is a 58 y.o.  here for a urogyn follow up.  Last visit .     1)  UI:  (+) CASSANDRA (rare) > (+) UUI  X years.  Both uncommon though.  (--) pads.  No underwear changes.  Daytime frequency: Q 1-4 hours (increases with coffee).  Nocturia: Yes: 1/night.   (--) dysuria,  (--) hematuria,  (--) frequent UTIs.  (--) complete bladder emptying, worse at night. With small PV volume, increased PV urgency.   Took medication a few years ago for UI, which helped, but then she started to have trouble emptying.  Then, she stopped.      2)  POP:  Absent.   (--) vaginal bleeding. (--) vaginal discharge. (+) sexually active.  (+) dyspareunia, ? From dryness.  (+)  Vaginal dryness.  (--) vaginal estrogen use.   --POP-Q:  Aa 0; Ba 0 (only with major strain); C -5; Ap -1; Bp -1; D -6.  Genital hiatus 2, perineal body 2. total vaginal length 9-10.    3)  BM:  (--) constipation/straining.  (--) chronic diarrhea. (--) hematochezia.  (--) fecal incontinence.  (--) fecal smearing/urgency.  (--) incomplete evacuation.     Past Medical History  Past Medical History:   Diagnosis Date    Allergy     UI (urinary incontinence)     Uterine fibroid         Past Surgical History  No past surgical history on file.     Hysterectomy: No    Past Ob History     x 2.  C/s x 0.    Largest infant weight: 8#2oz.   no FAVD. yes episiotomy.      Gynecologic History  LMP: No LMP recorded. Patient is postmenopausal.  Age of menarche: 14 y.o.   Age of menopause: 50 y.o.   Menstrual history: h/o normal.    Last pap: , normal. No h/o abnl paps. No h/o STDs.   Last mammogram: 10/17, normal.    Colonoscopy: , normal.  Repeat due .    DEXA: reports normal  with GUERO work eval.    Issues include:  Patient Active Problem List   Diagnosis    Insomnia    Vaginal atrophy  "   Mixed incontinence urge and stress (male)(female)    Urgency of urination    Nocturia    Pelvic floor weakness    Post-menopausal       History since last visit:    1)  Mixed urinary incontinence, urge > stress:    --started pelvic floor PT on [address]  --nocturia: 2-3x/PM; improving; not too worried about this; does have some insomnia  --No major UI during day    2)  Post-void urgency:  --send urine for C&S  --improved with PT    3)  Vaginal atrophy (dryness):  Not using estrogen cream anymore.     4)  Stage 1-2 cystocele:  --not currently bothersome  --was having some trouble emptying bladder; PV urgency with small volume 2nd void    5)  Nocturia (nighttime urination): --nocturia: 2-3x/PM; improving; not too worried about this; does have some insomnia    Medications:    Current Outpatient Prescriptions:     calcium carbonate-vitamin D3 1,000 mg(2,500 mg)-800 unit Tab, Take by mouth once., Disp: , Rfl:     fluocinolone acetonide oil 0.01 % Drop, Place 5 drops in ear(s) daily as needed., Disp: 20 mL, Rfl: 0    flurbiprofen (ANSAID) 100 MG tablet, , Disp: , Rfl: 0    ROS:  As per HPI.      Exam  /60   Ht 4' 10" (1.473 m)   Wt 43.8 kg (96 lb 9 oz)   BMI 20.18 kg/m²   General: alert and oriented, no acute distress  Remainder of PE deferred.      Impression  1. Encounter for screening mammogram for breast cancer  Mammo Digital Screening Bilat With CAD   2. Mixed incontinence urge and stress (male)(female)     3. Nocturia     4. Pelvic floor weakness     5. Post-menopausal     6. Urgency of urination     7. Vaginal atrophy       We reviewed the above issues and discussed options for short-term versus long-term management of her problems.   Plan:       1)  Mixed urinary incontinence, urge > stress:    --Empty bladder every 3 hours.  Empty well: wait a minute, lean forward on toilet.    --Avoid dietary irritants (see sheet).  Keep diary x 3-5 days to determine your irritants.  --continue PT exercises at " home; make final PT appoinment  --URGE: consider medication in future.  Takes 2-4 weeks to see if will have effect.  For dry mouth: get sour, sugar free lozenge or gum.    --STRESS:  Pessary vs. Sling.     2)  Post-void urgency:  --send urine for C&S  --continue pelvic floor PT  --consider contribution of cystocele (PVR normal today on US)    3)  Vaginal atrophy (dryness):  Mild  --CTM for now; consider restarting estrogen in future (didn't help urinary U/F)  --use water-based lubrication with intercourse if needed    4)  Stage 2 cystocele:  --asymptomatic except for urinary U/F  --continue to monitor for now  --consider contribution to urinary symptoms if not better    5)  Nocturia (nighttime urination): stop fluids 2 hours before bed/no water by the bed.  If have leg swelling:  Elevate feet above chest x 1 hour before bed to get excess fluid off.  Can also use support hose (knee highs).    --continue PT  --work on sleep hygiene    6)  Schedule mammogram for 10/23/18 or after.      7)  RTC 1 year.      30 minutes were spent in face to face time with this patient  90 % of this time was spent in counseling and/or coordination of care     Priya Trejo MD  [facility]  Division of Female Pelvic Medicine and Reconstructive Surgery  Department of Obstetrics & Gynecology

## 2018-08-27 ENCOUNTER — CLINICAL SUPPORT (OUTPATIENT)
Dept: REHABILITATION | Facility: HOSPITAL | Age: 58
End: 2018-08-27
Attending: OBSTETRICS & GYNECOLOGY
Payer: COMMERCIAL

## 2018-08-27 DIAGNOSIS — N81.89 PELVIC FLOOR WEAKNESS: ICD-10-CM

## 2018-08-27 DIAGNOSIS — R39.15 URGENCY OF URINATION: ICD-10-CM

## 2018-08-27 DIAGNOSIS — R35.1 NOCTURIA: ICD-10-CM

## 2018-08-27 DIAGNOSIS — N39.46 MIXED INCONTINENCE URGE AND STRESS (MALE)(FEMALE): Primary | ICD-10-CM

## 2018-08-27 PROCEDURE — 97110 THERAPEUTIC EXERCISES: CPT | Mod: PN

## 2018-10-24 ENCOUNTER — HOSPITAL ENCOUNTER (OUTPATIENT)
Dept: RADIOLOGY | Facility: HOSPITAL | Age: 58
Discharge: HOME | End: 2018-10-24
Attending: OBSTETRICS & GYNECOLOGY
Payer: COMMERCIAL

## 2018-10-24 DIAGNOSIS — Z12.31 ENCOUNTER FOR SCREENING MAMMOGRAM FOR BREAST CANCER: ICD-10-CM

## 2018-10-24 PROCEDURE — 77063 BREAST TOMOSYNTHESIS BI: CPT | Mod: TC

## 2018-10-24 PROCEDURE — 77067 SCR MAMMO BI INCL CAD: CPT | Mod: 26,,, | Performed by: RADIOLOGY

## 2018-10-24 PROCEDURE — 77063 BREAST TOMOSYNTHESIS BI: CPT | Mod: 26,,, | Performed by: RADIOLOGY

## 2019-05-13 ENCOUNTER — OFFICE VISIT (OUTPATIENT)
Dept: INTERNAL MEDICINE | Facility: CLINIC | Age: 59
End: 2019-05-13
Payer: COMMERCIAL

## 2019-05-13 VITALS
HEIGHT: 58 IN | HEART RATE: 68 BPM | DIASTOLIC BLOOD PRESSURE: 82 MMHG | BODY MASS INDEX: 19.9 KG/M2 | SYSTOLIC BLOOD PRESSURE: 132 MMHG | WEIGHT: 94.81 LBS | OXYGEN SATURATION: 99 %

## 2019-05-13 DIAGNOSIS — M81.0 AGE-RELATED OSTEOPOROSIS WITHOUT CURRENT PATHOLOGICAL FRACTURE: ICD-10-CM

## 2019-05-13 DIAGNOSIS — Z13.220 ENCOUNTER FOR LIPID SCREENING FOR CARDIOVASCULAR DISEASE: ICD-10-CM

## 2019-05-13 DIAGNOSIS — Z78.0 POST-MENOPAUSAL: ICD-10-CM

## 2019-05-13 DIAGNOSIS — Z13.6 ENCOUNTER FOR LIPID SCREENING FOR CARDIOVASCULAR DISEASE: ICD-10-CM

## 2019-05-13 DIAGNOSIS — M25.569 LATERAL KNEE PAIN, UNSPECIFIED LATERALITY: ICD-10-CM

## 2019-05-13 DIAGNOSIS — N39.46 MIXED INCONTINENCE URGE AND STRESS (MALE)(FEMALE): ICD-10-CM

## 2019-05-13 DIAGNOSIS — Z00.00 ANNUAL PHYSICAL EXAM: Primary | ICD-10-CM

## 2019-05-13 PROCEDURE — 3075F SYST BP GE 130 - 139MM HG: CPT | Mod: CPTII,S$GLB,, | Performed by: FAMILY MEDICINE

## 2019-05-13 PROCEDURE — 99396 PREV VISIT EST AGE 40-64: CPT | Mod: S$GLB,,, | Performed by: FAMILY MEDICINE

## 2019-05-13 PROCEDURE — 99396 PR PREVENTIVE VISIT,EST,40-64: ICD-10-PCS | Mod: S$GLB,,, | Performed by: FAMILY MEDICINE

## 2019-05-13 PROCEDURE — 3079F PR MOST RECENT DIASTOLIC BLOOD PRESSURE 80-89 MM HG: ICD-10-PCS | Mod: CPTII,S$GLB,, | Performed by: FAMILY MEDICINE

## 2019-05-13 PROCEDURE — 99999 PR PBB SHADOW E&M-EST. PATIENT-LVL III: CPT | Mod: PBBFAC,,, | Performed by: FAMILY MEDICINE

## 2019-05-13 PROCEDURE — 3079F DIAST BP 80-89 MM HG: CPT | Mod: CPTII,S$GLB,, | Performed by: FAMILY MEDICINE

## 2019-05-13 PROCEDURE — 3075F PR MOST RECENT SYSTOLIC BLOOD PRESS GE 130-139MM HG: ICD-10-PCS | Mod: CPTII,S$GLB,, | Performed by: FAMILY MEDICINE

## 2019-05-13 PROCEDURE — 99999 PR PBB SHADOW E&M-EST. PATIENT-LVL III: ICD-10-PCS | Mod: PBBFAC,,, | Performed by: FAMILY MEDICINE

## 2019-05-13 RX ORDER — DICLOFENAC SODIUM 10 MG/G
2 GEL TOPICAL 4 TIMES DAILY
Qty: 100 G | Refills: 0 | Status: SHIPPED | OUTPATIENT
Start: 2019-05-13 | End: 2020-06-17

## 2019-05-13 NOTE — PROGRESS NOTES
"Subjective:       Patient ID: Mercedes Perez is a 59 y.o. female.    Chief Complaint: Annual Exam    HPI 58 y/o female with Osteoporosis is here for her annual exam.      She has L knee pain on and off for the past 4 months, pain is worse when she bends her knee, she denies accident or injury, pain is achy 3/10, she has not tried any topicals or medications.  She walks on the treadmill for 30 min 4-5 days a week, she does not have pain when she exercises. 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  Sleeping ok.  She denies f/n/v/heartburn/d/constipation/cp/sob. She is still having mixed urinary incontinence, she completed PT, she does her exercises sometimes. Appetite good, eating fairly healthy.    Mixed incontinence/cystocele: following with Urogyn  Osteoporosis: she is taking 1200 mg of Calcium and 1000 IU D3, declined medication, will repeat Dexa in 2020  Eye exam utd 1/2018 normal, due for follow up  Dental utd  GYN: mmg utd  Colonoscopy done 12/2010 normal, in legacy repeat due in 2020    Review of Systems   Constitutional: Negative for activity change, appetite change, fatigue and fever.   Respiratory: Negative for cough and shortness of breath.    Cardiovascular: Negative for chest pain, palpitations and leg swelling.   Gastrointestinal: Negative for constipation, diarrhea, nausea and vomiting.   Genitourinary: Positive for frequency. Negative for difficulty urinating, dysuria and urgency.   Musculoskeletal: Positive for arthralgias.   Skin: Negative for rash.   Neurological: Negative for dizziness and light-headedness.   Psychiatric/Behavioral: Negative for sleep disturbance.       Objective:      /82 (BP Location: Right arm, Patient Position: Sitting, BP Method: Large (Manual))   Pulse 68   Ht 4' 10" (1.473 m)   Wt 43 kg (94 lb 12.8 oz)   SpO2 99%   BMI 19.81 kg/m²   Physical Exam   Constitutional: She appears well-developed and well-nourished.   HENT: "   Head: Normocephalic and atraumatic.   Mouth/Throat: No oropharyngeal exudate.   Neck: Normal range of motion. Neck supple. No thyromegaly present.   Cardiovascular: Normal rate, regular rhythm and normal heart sounds.   Pulmonary/Chest: Effort normal and breath sounds normal. No respiratory distress.   Abdominal: Soft. Bowel sounds are normal. She exhibits no distension. There is no tenderness.   Musculoskeletal: Normal range of motion. She exhibits no edema or tenderness.   Strength 5/5 bilateral lower extremity   Lymphadenopathy:     She has no cervical adenopathy.   Neurological: She is alert.   Skin: Skin is warm and dry.   Psychiatric: She has a normal mood and affect.   Nursing note and vitals reviewed.      Assessment:       1. Annual physical exam    2. Encounter for lipid screening for cardiovascular disease    3. Mixed incontinence urge and stress (male)(female)    4. Post-menopausal    5. Age-related osteoporosis without current pathological fracture    6. Lateral knee pain, unspecified laterality        Plan:   [facility] was seen today for annual exam.    Diagnoses and all orders for this visit:    Annual physical exam  -     CBC auto differential; Future  -     Comprehensive metabolic panel; Future  -     Hemoglobin A1c; Future  -     Lipid panel; Future  -     TSH; Future  -     Vitamin D; Future  -     Urinalysis; Future    Encounter for lipid screening for cardiovascular disease  -     Lipid panel; Future    Mixed incontinence urge and stress (male)(female)    Post-menopausal  -     Vitamin D; Future    Age-related osteoporosis without current pathological fracture  -     Vitamin D; Future    Lateral knee pain, unspecified laterality  -     X-Ray Knee 1 or 2 View Left; Future  -     diclofenac sodium (VOLTAREN) 1 % Gel; Apply 2 g topically 4 (four) times daily.

## 2019-05-17 ENCOUNTER — HOSPITAL ENCOUNTER (OUTPATIENT)
Dept: RADIOLOGY | Facility: HOSPITAL | Age: 59
Discharge: HOME | End: 2019-05-17
Attending: FAMILY MEDICINE
Payer: COMMERCIAL

## 2019-05-17 DIAGNOSIS — M25.569 LATERAL KNEE PAIN, UNSPECIFIED LATERALITY: ICD-10-CM

## 2019-05-17 PROCEDURE — 73560 X-RAY EXAM OF KNEE 1 OR 2: CPT | Mod: TC,LT

## 2019-05-17 PROCEDURE — 73560 XR KNEE 1 OR 2 VIEW LEFT: ICD-10-PCS | Mod: 26,LT,, | Performed by: RADIOLOGY

## 2019-05-17 PROCEDURE — 73560 X-RAY EXAM OF KNEE 1 OR 2: CPT | Mod: 26,LT,, | Performed by: RADIOLOGY

## 2019-05-30 DIAGNOSIS — H60.543 DERMATITIS OF EAR CANAL, BILATERAL: ICD-10-CM

## 2019-05-30 RX ORDER — FLUOCINOLONE ACETONIDE 0.11 MG/ML
OIL AURICULAR (OTIC)
Qty: 20 ML | Refills: 0 | Status: SHIPPED | OUTPATIENT
Start: 2019-05-30 | End: 2022-02-17

## 2019-10-22 ENCOUNTER — PATIENT OUTREACH (OUTPATIENT)
Dept: ADMINISTRATIVE | Facility: OTHER | Age: 59
End: 2019-10-22

## 2019-10-24 ENCOUNTER — OFFICE VISIT (OUTPATIENT)
Dept: UROGYNECOLOGY | Facility: CLINIC | Age: 59
End: 2019-10-24
Payer: COMMERCIAL

## 2019-10-24 VITALS
DIASTOLIC BLOOD PRESSURE: 78 MMHG | BODY MASS INDEX: 19.76 KG/M2 | WEIGHT: 94.13 LBS | HEIGHT: 58 IN | SYSTOLIC BLOOD PRESSURE: 120 MMHG

## 2019-10-24 DIAGNOSIS — N95.2 VAGINAL ATROPHY: Primary | ICD-10-CM

## 2019-10-24 DIAGNOSIS — N39.46 MIXED INCONTINENCE URGE AND STRESS (MALE)(FEMALE): ICD-10-CM

## 2019-10-24 DIAGNOSIS — R35.1 NOCTURIA: ICD-10-CM

## 2019-10-24 DIAGNOSIS — N81.89 PELVIC FLOOR WEAKNESS: ICD-10-CM

## 2019-10-24 DIAGNOSIS — R39.15 URINARY URGENCY: ICD-10-CM

## 2019-10-24 DIAGNOSIS — Z78.0 POST-MENOPAUSAL: ICD-10-CM

## 2019-10-24 DIAGNOSIS — R39.15 URGENCY OF URINATION: ICD-10-CM

## 2019-10-24 DIAGNOSIS — Z12.39 BREAST CANCER SCREENING: ICD-10-CM

## 2019-10-24 PROCEDURE — 3078F PR MOST RECENT DIASTOLIC BLOOD PRESSURE < 80 MM HG: ICD-10-PCS | Mod: CPTII,S$GLB,, | Performed by: OBSTETRICS & GYNECOLOGY

## 2019-10-24 PROCEDURE — 99999 PR PBB SHADOW E&M-EST. PATIENT-LVL III: CPT | Mod: PBBFAC,,, | Performed by: OBSTETRICS & GYNECOLOGY

## 2019-10-24 PROCEDURE — 3008F PR BODY MASS INDEX (BMI) DOCUMENTED: ICD-10-PCS | Mod: CPTII,S$GLB,, | Performed by: OBSTETRICS & GYNECOLOGY

## 2019-10-24 PROCEDURE — 3074F PR MOST RECENT SYSTOLIC BLOOD PRESSURE < 130 MM HG: ICD-10-PCS | Mod: CPTII,S$GLB,, | Performed by: OBSTETRICS & GYNECOLOGY

## 2019-10-24 PROCEDURE — 3074F SYST BP LT 130 MM HG: CPT | Mod: CPTII,S$GLB,, | Performed by: OBSTETRICS & GYNECOLOGY

## 2019-10-24 PROCEDURE — 99999 PR PBB SHADOW E&M-EST. PATIENT-LVL III: ICD-10-PCS | Mod: PBBFAC,,, | Performed by: OBSTETRICS & GYNECOLOGY

## 2019-10-24 PROCEDURE — 3078F DIAST BP <80 MM HG: CPT | Mod: CPTII,S$GLB,, | Performed by: OBSTETRICS & GYNECOLOGY

## 2019-10-24 PROCEDURE — 99213 OFFICE O/P EST LOW 20 MIN: CPT | Mod: S$GLB,,, | Performed by: OBSTETRICS & GYNECOLOGY

## 2019-10-24 PROCEDURE — 3008F BODY MASS INDEX DOCD: CPT | Mod: CPTII,S$GLB,, | Performed by: OBSTETRICS & GYNECOLOGY

## 2019-10-24 PROCEDURE — 99213 PR OFFICE/OUTPT VISIT, EST, LEVL III, 20-29 MIN: ICD-10-PCS | Mod: S$GLB,,, | Performed by: OBSTETRICS & GYNECOLOGY

## 2019-10-24 RX ORDER — OXYBUTYNIN CHLORIDE 5 MG/1
5 TABLET, EXTENDED RELEASE ORAL DAILY
Qty: 30 TABLET | Refills: 12 | Status: SHIPPED | OUTPATIENT
Start: 2019-10-24 | End: 2020-06-17

## 2019-10-24 RX ORDER — ESTRADIOL 0.1 MG/G
0.5 CREAM VAGINAL
Qty: 42.5 G | Refills: 11 | Status: SHIPPED | OUTPATIENT
Start: 2019-10-24 | End: 2022-02-17

## 2019-10-24 NOTE — PATIENT INSTRUCTIONS
1)  Mixed urinary incontinence, urge > stress:    --Empty bladder every 3 hours.  Empty well: wait a minute, lean forward on toilet.    --Avoid dietary irritants (see sheet).  Keep diary x 3-5 days to determine your irritants.  --continue PT exercises at home  --URGE: start oxybutynin 5 xl daily.   Takes 2-4 weeks to see if will have effect.  For dry mouth: get sour, sugar free lozenge or gum.    --STRESS:  Pessary vs. Sling.     2)  Post-void urgency:  --PVRs have been normal  --continue pelvic floor PT exercises at home  --try oxybutyning  --consider contribution of cystocele (PVR normal)    3)  Vaginal atrophy (dryness):    --restart estrogen 0.5 g 2x/week in vagina a night  --may help urinary urgency/frequency    4)  Stage 2 cystocele:  --asymptomatic except for urinary U/F  --continue to monitor for now  --consider contribution to urinary symptoms if not better    5)  Nocturia (nighttime urination): stop fluids 2 hours before bed/no water by the bed.  If have leg swelling:  Elevate feet above chest x 1 hour before bed to get excess fluid off.  Can also use support hose (knee highs).    --continue PT  --work on sleep hygiene    6)  Schedule mammogram for 10/24/19 or after.      7)   RTC 2 months with NP to make sure medication is working.

## 2019-10-24 NOTE — PROGRESS NOTES
Urogyn follow up  10/27/2019    [facility] UROGYN [facility]   [address] [address]    [facility] Nanette Perez  [id number]  1960      Mercedes Perez is a 59 y.o.  here for a urogyn follow up.  Last visit .     1)  UI:  (+) CASSANDRA (rare) > (+) UUI  X years.  Both uncommon though.  (--) pads.  No underwear changes.  Daytime frequency: Q 1-4 hours (increases with coffee).  Nocturia: Yes: 1/night.   (--) dysuria,  (--) hematuria,  (--) frequent UTIs.  (--) complete bladder emptying, worse at night. With small PV volume, increased PV urgency.   Took medication a few years ago for UI, which helped, but then she started to have trouble emptying.  Then, she stopped.      2)  POP:  Absent.   (--) vaginal bleeding. (--) vaginal discharge. (+) sexually active.  (+) dyspareunia, ? From dryness.  (+)  Vaginal dryness.  (--) vaginal estrogen use.   --POP-Q:  Aa 0; Ba 0 (only with major strain); C -5; Ap -1; Bp -1; D -6.  Genital hiatus 2, perineal body 2. total vaginal length 9-10.  --PVR: 30-50 on 2 separate measures    3)  BM:  (--) constipation/straining.  (--) chronic diarrhea. (--) hematochezia.  (--) fecal incontinence.  (--) fecal smearing/urgency.  (--) incomplete evacuation.     Past Medical History  Past Medical History:   Diagnosis Date    Allergy     UI (urinary incontinence)     Uterine fibroid         Past Surgical History  No past surgical history on file.     Hysterectomy: No    Past Ob History     x 2.  C/s x 0.    Largest infant weight: 8#2oz.   no FAVD. yes episiotomy.      Gynecologic History  LMP: No LMP recorded. Patient is postmenopausal.  Age of menarche: 14 y.o.   Age of menopause: 50 y.o.   Menstrual history: h/o normal.    Last pap: , normal. No h/o abnl paps. No h/o STDs.   Last mammogram: 10/18, normal.    Colonoscopy: , normal.  Repeat due -.    DEXA: reports normal  with GUERO work eval.    Issues include:  Patient Active Problem List    Diagnosis    Insomnia    Vaginal atrophy    Mixed incontinence urge and stress (male)(female)    Urgency of urination    Nocturia    Pelvic floor weakness    Post-menopausal    Age-related osteoporosis without current pathological fracture       History since last visit:    1)  Mixed urinary incontinence, urge > stress:    --baseline: (+) CASSANDRA (rare) > (+) UUI  X years.  Both uncommon though.  (--) pads.  No underwear changes.   --currently: no major leakage.  Mostly bothered by urgency/frequency. Worse when she travels.  Does have some sensation of incomplete emptying--DV to help with min-mod amount produced.   --s/p pelvic floor PT on [address]--helped a little but forgets to do exercises  --is avoiding dietary triggers  --nocturia: 1-2x/PM--depends on intake before bed; improving; not too worried about this; does have some insomnia  --No major UI during day    2)  Post-void urgency:  --send urine for C&S  --improved with PT    3)  Vaginal atrophy (dryness):  Not using estrogen cream anymore.     4)  Stage 1-2 cystocele:  --not currently bothersome  --was having some trouble emptying bladder; PV urgency with small volume 2nd void    5)  Nocturia (nighttime urination):1-2x/PM--depends on intake before bed; improving; not too worried about this; does have some insomnia    Medications:    Current Outpatient Medications:     calcium carbonate-vitamin D3 1,000 mg(2,500 mg)-800 unit Tab, Take by mouth once., Disp: , Rfl:     fluocinolone acetonide oil 0.01 % Drop, INSTILL 5 DROPS INTO EAR(S) ONCE DAILY AS NEEDED, Disp: 20 mL, Rfl: 0    diclofenac sodium (VOLTAREN) 1 % Gel, Apply 2 g topically 4 (four) times daily. (Patient not taking: Reported on 10/24/2019), Disp: 100 g, Rfl: 0    estradiol (ESTRACE) 0.01 % (0.1 mg/gram) vaginal cream, Place 0.5 g vaginally twice a week., Disp: 42.5 g, Rfl: 11    oxybutynin (DITROPAN-XL) 5 MG TR24, Take 1 tablet (5 mg total) by mouth once daily., Disp: 30 tablet, Rfl:  "12    ROS:  As per HPI.      Exam  /78 (BP Location: Left arm, Patient Position: Sitting, BP Method: Medium (Manual))   Ht 4' 10" (1.473 m)   Wt 42.7 kg (94 lb 2.2 oz)   BMI 19.67 kg/m²   General: alert and oriented, no acute distress  Remainder of PE deferred.      Impression  1. Vaginal atrophy  estradiol (ESTRACE) 0.01 % (0.1 mg/gram) vaginal cream   2. Urinary urgency  oxybutynin (DITROPAN-XL) 5 MG TR24   3. Breast cancer screening  CANCELED: Mammo Digital Screening Bilat With CAD   4. Mixed incontinence urge and stress (male)(female)     5. Nocturia     6. Pelvic floor weakness     7. Post-menopausal     8. Urgency of urination       We reviewed the above issues and discussed options for short-term versus long-term management of her problems.   Plan:     1)  Mixed urinary incontinence, urge > stress:    --Empty bladder every 3 hours.  Empty well: wait a minute, lean forward on toilet.    --Avoid dietary irritants (see sheet).  Keep diary x 3-5 days to determine your irritants.  --continue PT exercises at home  --URGE: start oxybutynin 5 xl daily.   Takes 2-4 weeks to see if will have effect.  For dry mouth: get sour, sugar free lozenge or gum.    --STRESS:  Pessary vs. Sling.     2)  Post-void urgency:  --PVRs have been normal  --continue pelvic floor PT exercises at home  --try oxybutyning  --consider contribution of cystocele (PVR normal)    3)  Vaginal atrophy (dryness):    --restart estrogen 0.5 g 2x/week in vagina a night  --may help urinary urgency/frequency    4)  Stage 2 cystocele:  --asymptomatic except for urinary U/F  --continue to monitor for now  --consider contribution to urinary symptoms if not better    5)  Nocturia (nighttime urination): stop fluids 2 hours before bed/no water by the bed.  If have leg swelling:  Elevate feet above chest x 1 hour before bed to get excess fluid off.  Can also use support hose (knee highs).    --continue PT  --work on sleep hygiene    6)  Schedule " mammogram for 10/24/19 or after.      7)   RTC 2 months with NP to make sure medication is working.      30 minutes were spent in face to face time with this patient  100 % of this time was spent in counseling and/or coordination of care     Priya Trejo MD  [facility]  Division of Female Pelvic Medicine and Reconstructive Surgery  Department of Obstetrics & Gynecology

## 2019-10-25 ENCOUNTER — HOSPITAL ENCOUNTER (OUTPATIENT)
Dept: RADIOLOGY | Facility: HOSPITAL | Age: 59
Discharge: HOME | End: 2019-10-25
Attending: OBSTETRICS & GYNECOLOGY
Payer: COMMERCIAL

## 2019-10-25 DIAGNOSIS — Z12.39 BREAST CANCER SCREENING: ICD-10-CM

## 2019-10-25 PROCEDURE — 77067 SCR MAMMO BI INCL CAD: CPT | Mod: TC

## 2019-10-25 PROCEDURE — 77067 MAMMO DIGITAL SCREENING BILAT WITH TOMOSYNTHESIS_CAD: ICD-10-PCS | Mod: 26,,, | Performed by: RADIOLOGY

## 2019-10-25 PROCEDURE — 77063 BREAST TOMOSYNTHESIS BI: CPT | Mod: 26,,, | Performed by: RADIOLOGY

## 2019-10-25 PROCEDURE — 77067 SCR MAMMO BI INCL CAD: CPT | Mod: 26,,, | Performed by: RADIOLOGY

## 2019-10-25 PROCEDURE — 77063 MAMMO DIGITAL SCREENING BILAT WITH TOMOSYNTHESIS_CAD: ICD-10-PCS | Mod: 26,,, | Performed by: RADIOLOGY

## 2020-06-17 ENCOUNTER — OFFICE VISIT (OUTPATIENT)
Dept: INTERNAL MEDICINE | Facility: CLINIC | Age: 60
End: 2020-06-17
Payer: COMMERCIAL

## 2020-06-17 VITALS
DIASTOLIC BLOOD PRESSURE: 74 MMHG | HEART RATE: 66 BPM | BODY MASS INDEX: 20.08 KG/M2 | WEIGHT: 95.69 LBS | OXYGEN SATURATION: 99 % | HEIGHT: 58 IN | SYSTOLIC BLOOD PRESSURE: 126 MMHG | TEMPERATURE: 98 F

## 2020-06-17 DIAGNOSIS — Z00.00 ANNUAL PHYSICAL EXAM: Primary | ICD-10-CM

## 2020-06-17 DIAGNOSIS — R42 DIZZINESS: ICD-10-CM

## 2020-06-17 DIAGNOSIS — H92.03 EAR PAIN, BILATERAL: ICD-10-CM

## 2020-06-17 DIAGNOSIS — M81.0 AGE-RELATED OSTEOPOROSIS WITHOUT CURRENT PATHOLOGICAL FRACTURE: ICD-10-CM

## 2020-06-17 DIAGNOSIS — Z78.0 POST-MENOPAUSAL: ICD-10-CM

## 2020-06-17 DIAGNOSIS — M81.0 OSTEOPOROSIS, UNSPECIFIED OSTEOPOROSIS TYPE, UNSPECIFIED PATHOLOGICAL FRACTURE PRESENCE: ICD-10-CM

## 2020-06-17 DIAGNOSIS — Z13.6 ENCOUNTER FOR LIPID SCREENING FOR CARDIOVASCULAR DISEASE: ICD-10-CM

## 2020-06-17 DIAGNOSIS — Z13.220 ENCOUNTER FOR LIPID SCREENING FOR CARDIOVASCULAR DISEASE: ICD-10-CM

## 2020-06-17 DIAGNOSIS — Z12.83 SKIN CANCER SCREENING: ICD-10-CM

## 2020-06-17 DIAGNOSIS — Z12.11 SCREEN FOR COLON CANCER: ICD-10-CM

## 2020-06-17 DIAGNOSIS — R53.83 FATIGUE, UNSPECIFIED TYPE: ICD-10-CM

## 2020-06-17 PROCEDURE — 93005 ELECTROCARDIOGRAM TRACING: CPT | Mod: S$GLB,,, | Performed by: FAMILY MEDICINE

## 2020-06-17 PROCEDURE — 3074F PR MOST RECENT SYSTOLIC BLOOD PRESSURE < 130 MM HG: ICD-10-PCS | Mod: CPTII,S$GLB,, | Performed by: FAMILY MEDICINE

## 2020-06-17 PROCEDURE — 93010 EKG 12-LEAD: ICD-10-PCS | Mod: S$GLB,,, | Performed by: INTERNAL MEDICINE

## 2020-06-17 PROCEDURE — 99999 PR PBB SHADOW E&M-EST. PATIENT-LVL IV: ICD-10-PCS | Mod: PBBFAC,,, | Performed by: FAMILY MEDICINE

## 2020-06-17 PROCEDURE — 3078F DIAST BP <80 MM HG: CPT | Mod: CPTII,S$GLB,, | Performed by: FAMILY MEDICINE

## 2020-06-17 PROCEDURE — 99999 PR PBB SHADOW E&M-EST. PATIENT-LVL IV: CPT | Mod: PBBFAC,,, | Performed by: FAMILY MEDICINE

## 2020-06-17 PROCEDURE — 93010 ELECTROCARDIOGRAM REPORT: CPT | Mod: S$GLB,,, | Performed by: INTERNAL MEDICINE

## 2020-06-17 PROCEDURE — 99396 PR PREVENTIVE VISIT,EST,40-64: ICD-10-PCS | Mod: S$GLB,,, | Performed by: FAMILY MEDICINE

## 2020-06-17 PROCEDURE — 93005 EKG 12-LEAD: ICD-10-PCS | Mod: S$GLB,,, | Performed by: FAMILY MEDICINE

## 2020-06-17 PROCEDURE — 3074F SYST BP LT 130 MM HG: CPT | Mod: CPTII,S$GLB,, | Performed by: FAMILY MEDICINE

## 2020-06-17 PROCEDURE — 3078F PR MOST RECENT DIASTOLIC BLOOD PRESSURE < 80 MM HG: ICD-10-PCS | Mod: CPTII,S$GLB,, | Performed by: FAMILY MEDICINE

## 2020-06-17 PROCEDURE — 99396 PREV VISIT EST AGE 40-64: CPT | Mod: S$GLB,,, | Performed by: FAMILY MEDICINE

## 2020-06-17 NOTE — PROGRESS NOTES
"Subjective:       Patient ID: Mercedes Perez is a 60 y.o. female.    Chief Complaint: Annual Exam    HPI  59 y/o female with Osteoporosis is here for her annual exam.       She is feeling good in general, she reports she has been a little more tired than usual a few days a week for the past month, she is sleeping well most nights. Also over the past few months, she gets occasional dizzy feeling when she is moving around really fast, it lasts 15 min to an hour, if she sits down and rests it will resolves, she denies feeling like she will pass out, she denies vision changes/sinus sx, she has had ear pain for the past month, occasional ringing in her ear, she denies f/n/v, she gets occasional loose stools a few times a week depending on what she eats, otherwise normal stools she denies cp/palpitations/sob, she has overactive bladder, she tried ditropan but it was not helpful, she is using topical estrogen cream. She walks on her treadmill 30 min a day, she is eating regularly and healthy, she feels she is staying hydrated. Mood ok.      Mixed incontinence/cystocele: following with Urogyn  Osteoporosis: she is taking 1200 mg of Calcium and 1000 IU D3, declined medication, repeat Dexa due  Eye exam done with Dr. Irby she has small cataract  Dental utd  GYN: pap 2017, mmg 10/2019  Colonoscopy done 12/2010 normal, in legacy repeat due      Review of Systems  see HPI  Objective:      /74 (BP Location: Left arm, Patient Position: Sitting, BP Method: Medium (Manual))   Pulse 66   Temp 98.3 °F (36.8 °C)   Ht 4' 10" (1.473 m)   Wt 43.4 kg (95 lb 10.9 oz)   SpO2 99%   BMI 20.00 kg/m²   Physical Exam  Vitals signs and nursing note reviewed.   Constitutional:       Appearance: She is well-developed.   HENT:      Head: Normocephalic and atraumatic.      Right Ear: Tympanic membrane normal.      Left Ear: Tympanic membrane normal.      Mouth/Throat:      Pharynx: No oropharyngeal exudate.   Neck:      Musculoskeletal: " Normal range of motion and neck supple.      Thyroid: No thyromegaly.   Cardiovascular:      Rate and Rhythm: Normal rate and regular rhythm.      Heart sounds: Normal heart sounds.   Pulmonary:      Effort: Pulmonary effort is normal. No respiratory distress.      Breath sounds: Normal breath sounds.   Abdominal:      General: Abdomen is flat. Bowel sounds are normal. There is no distension.      Palpations: Abdomen is soft. There is no mass.   Lymphadenopathy:      Cervical: No cervical adenopathy.   Skin:     General: Skin is warm and dry.   Neurological:      Mental Status: She is alert.         Assessment:       1. Annual physical exam    2. Screen for colon cancer    3. Osteoporosis, unspecified osteoporosis type, unspecified pathological fracture presence    4. Fatigue, unspecified type    5. Encounter for lipid screening for cardiovascular disease    6. Age-related osteoporosis without current pathological fracture    7. Post-menopausal    8. Dizziness    9. Skin cancer screening    10. Ear pain, bilateral        Plan:   Mercedes was seen today for annual exam.    Diagnoses and all orders for this visit:    Annual physical exam  -     CBC auto differential; Future  -     Comprehensive metabolic panel; Future  -     Hemoglobin A1C; Future  -     Lipid Panel; Future  -     TSH; Future  -     Vitamin D; Future  -     Iron and TIBC; Future  -     Ferritin; Future  -     Vitamin B12; Future  -     Urinalysis; Future    Screen for colon cancer  -     Case request GI: COLONOSCOPY    Osteoporosis, unspecified osteoporosis type, unspecified pathological fracture presence  -     DXA Bone Density Spine And Hip; Future    Fatigue, unspecified type  -     CBC auto differential; Future  -     Comprehensive metabolic panel; Future  -     TSH; Future  -     Vitamin D; Future  -     Iron and TIBC; Future  -     Ferritin; Future  -     Vitamin B12; Future  -     IN OFFICE EKG 12-LEAD (to Muse)    Encounter for lipid screening for  cardiovascular disease  -     Lipid Panel; Future    Age-related osteoporosis without current pathological fracture    Post-menopausal    Dizziness  -     IN OFFICE EKG 12-LEAD (to Muse)    Skin cancer screening  -     Ambulatory referral/consult to Dermatology; Future    Ear pain, bilateral  -     Ambulatory referral/consult to ENT; Future

## 2020-06-17 NOTE — PROGRESS NOTES
Orthostatic vital signs were performed.   Patient was assisted to a lying position with legs supported. First measurement was taken after five minutes of lying. Patient was sat on the the side of the exam table with legs dangling and the second measurement was taken after one minute. Patient stood up and the third measurement was taken after one minute.  The blood pressure and pulse values are:  Lying position with blood pressure 137/66 and pulse 63  Sitting position with blood pressure 136/78 and pulse 62  Standing position with blood pressure  135/78 and pulse 64

## 2020-06-18 ENCOUNTER — LAB VISIT (OUTPATIENT)
Dept: LAB | Facility: HOSPITAL | Age: 60
End: 2020-06-18
Attending: FAMILY MEDICINE
Payer: COMMERCIAL

## 2020-06-18 DIAGNOSIS — R53.83 FATIGUE, UNSPECIFIED TYPE: ICD-10-CM

## 2020-06-18 DIAGNOSIS — Z13.220 ENCOUNTER FOR LIPID SCREENING FOR CARDIOVASCULAR DISEASE: ICD-10-CM

## 2020-06-18 DIAGNOSIS — Z13.6 ENCOUNTER FOR LIPID SCREENING FOR CARDIOVASCULAR DISEASE: ICD-10-CM

## 2020-06-18 DIAGNOSIS — Z00.00 ANNUAL PHYSICAL EXAM: ICD-10-CM

## 2020-06-18 LAB
25(OH)D3+25(OH)D2 SERPL-MCNC: 55 NG/ML (ref 30–96)
ALBUMIN SERPL BCP-MCNC: 4.1 G/DL (ref 3.5–5.2)
ALP SERPL-CCNC: 67 U/L (ref 55–135)
ALT SERPL W/O P-5'-P-CCNC: 22 U/L (ref 10–44)
ANION GAP SERPL CALC-SCNC: 8 MMOL/L (ref 8–16)
AST SERPL-CCNC: 23 U/L (ref 10–40)
BASOPHILS # BLD AUTO: 0.04 K/UL (ref 0–0.2)
BASOPHILS NFR BLD: 1 % (ref 0–1.9)
BILIRUB SERPL-MCNC: 0.5 MG/DL (ref 0.1–1)
BUN SERPL-MCNC: 16 MG/DL (ref 6–20)
CALCIUM SERPL-MCNC: 9.4 MG/DL (ref 8.7–10.5)
CHLORIDE SERPL-SCNC: 104 MMOL/L (ref 95–110)
CHOLEST SERPL-MCNC: 227 MG/DL (ref 120–199)
CHOLEST/HDLC SERPL: 3 {RATIO} (ref 2–5)
CO2 SERPL-SCNC: 25 MMOL/L (ref 23–29)
CREAT SERPL-MCNC: 0.7 MG/DL (ref 0.5–1.4)
DIFFERENTIAL METHOD: ABNORMAL
EOSINOPHIL # BLD AUTO: 0.2 K/UL (ref 0–0.5)
EOSINOPHIL NFR BLD: 4.6 % (ref 0–8)
ERYTHROCYTE [DISTWIDTH] IN BLOOD BY AUTOMATED COUNT: 12.8 % (ref 11.5–14.5)
EST. GFR  (AFRICAN AMERICAN): >60 ML/MIN/1.73 M^2
EST. GFR  (NON AFRICAN AMERICAN): >60 ML/MIN/1.73 M^2
ESTIMATED AVG GLUCOSE: 111 MG/DL (ref 68–131)
FERRITIN SERPL-MCNC: 248 NG/ML (ref 20–300)
GLUCOSE SERPL-MCNC: 102 MG/DL (ref 70–110)
HBA1C MFR BLD HPLC: 5.5 % (ref 4–5.6)
HCT VFR BLD AUTO: 41.9 % (ref 37–48.5)
HDLC SERPL-MCNC: 75 MG/DL (ref 40–75)
HDLC SERPL: 33 % (ref 20–50)
HGB BLD-MCNC: 13.5 G/DL (ref 12–16)
IMM GRANULOCYTES # BLD AUTO: 0.01 K/UL (ref 0–0.04)
IMM GRANULOCYTES NFR BLD AUTO: 0.3 % (ref 0–0.5)
IRON SERPL-MCNC: 100 UG/DL (ref 30–160)
LDLC SERPL CALC-MCNC: 135.4 MG/DL (ref 63–159)
LYMPHOCYTES # BLD AUTO: 1.9 K/UL (ref 1–4.8)
LYMPHOCYTES NFR BLD: 47.9 % (ref 18–48)
MCH RBC QN AUTO: 31.6 PG (ref 27–31)
MCHC RBC AUTO-ENTMCNC: 32.2 G/DL (ref 32–36)
MCV RBC AUTO: 98 FL (ref 82–98)
MONOCYTES # BLD AUTO: 0.2 K/UL (ref 0.3–1)
MONOCYTES NFR BLD: 6.2 % (ref 4–15)
NEUTROPHILS # BLD AUTO: 1.6 K/UL (ref 1.8–7.7)
NEUTROPHILS NFR BLD: 40 % (ref 38–73)
NONHDLC SERPL-MCNC: 152 MG/DL
NRBC BLD-RTO: 0 /100 WBC
PLATELET # BLD AUTO: 259 K/UL (ref 150–350)
PMV BLD AUTO: 8.8 FL (ref 9.2–12.9)
POTASSIUM SERPL-SCNC: 3.8 MMOL/L (ref 3.5–5.1)
PROT SERPL-MCNC: 7.6 G/DL (ref 6–8.4)
RBC # BLD AUTO: 4.27 M/UL (ref 4–5.4)
SATURATED IRON: 28 % (ref 20–50)
SODIUM SERPL-SCNC: 137 MMOL/L (ref 136–145)
TOTAL IRON BINDING CAPACITY: 363 UG/DL (ref 250–450)
TRANSFERRIN SERPL-MCNC: 245 MG/DL (ref 200–375)
TRIGL SERPL-MCNC: 83 MG/DL (ref 30–150)
TSH SERPL DL<=0.005 MIU/L-ACNC: 3.3 UIU/ML (ref 0.4–4)
VIT B12 SERPL-MCNC: 844 PG/ML (ref 210–950)
WBC # BLD AUTO: 3.9 K/UL (ref 3.9–12.7)

## 2020-06-18 PROCEDURE — 83540 ASSAY OF IRON: CPT

## 2020-06-18 PROCEDURE — 80061 LIPID PANEL: CPT

## 2020-06-18 PROCEDURE — 80053 COMPREHEN METABOLIC PANEL: CPT

## 2020-06-18 PROCEDURE — 82728 ASSAY OF FERRITIN: CPT

## 2020-06-18 PROCEDURE — 85025 COMPLETE CBC W/AUTO DIFF WBC: CPT

## 2020-06-18 PROCEDURE — 82607 VITAMIN B-12: CPT

## 2020-06-18 PROCEDURE — 84443 ASSAY THYROID STIM HORMONE: CPT

## 2020-06-18 PROCEDURE — 36415 COLL VENOUS BLD VENIPUNCTURE: CPT

## 2020-06-18 PROCEDURE — 82306 VITAMIN D 25 HYDROXY: CPT

## 2020-06-18 PROCEDURE — 83036 HEMOGLOBIN GLYCOSYLATED A1C: CPT

## 2020-06-22 ENCOUNTER — HOSPITAL ENCOUNTER (OUTPATIENT)
Dept: RADIOLOGY | Facility: CLINIC | Age: 60
Discharge: HOME | End: 2020-06-22
Attending: FAMILY MEDICINE
Payer: COMMERCIAL

## 2020-06-22 ENCOUNTER — OFFICE VISIT (OUTPATIENT)
Dept: OTOLARYNGOLOGY | Facility: CLINIC | Age: 60
End: 2020-06-22
Payer: COMMERCIAL

## 2020-06-22 VITALS — BODY MASS INDEX: 19.9 KG/M2 | WEIGHT: 95.25 LBS

## 2020-06-22 DIAGNOSIS — M81.0 OSTEOPOROSIS, UNSPECIFIED OSTEOPOROSIS TYPE, UNSPECIFIED PATHOLOGICAL FRACTURE PRESENCE: ICD-10-CM

## 2020-06-22 DIAGNOSIS — H92.03 EAR PAIN, BILATERAL: ICD-10-CM

## 2020-06-22 DIAGNOSIS — R42 DIZZINESS: Primary | ICD-10-CM

## 2020-06-22 PROCEDURE — 99204 PR OFFICE/OUTPT VISIT, NEW, LEVL IV, 45-59 MIN: ICD-10-PCS | Mod: S$GLB,,, | Performed by: OTOLARYNGOLOGY

## 2020-06-22 PROCEDURE — 99999 PR PBB SHADOW E&M-EST. PATIENT-LVL III: ICD-10-PCS | Mod: PBBFAC,,, | Performed by: OTOLARYNGOLOGY

## 2020-06-22 PROCEDURE — 3008F PR BODY MASS INDEX (BMI) DOCUMENTED: ICD-10-PCS | Mod: CPTII,S$GLB,, | Performed by: OTOLARYNGOLOGY

## 2020-06-22 PROCEDURE — 99204 OFFICE O/P NEW MOD 45 MIN: CPT | Mod: S$GLB,,, | Performed by: OTOLARYNGOLOGY

## 2020-06-22 PROCEDURE — 77080 DXA BONE DENSITY AXIAL: CPT | Mod: 26,,, | Performed by: INTERNAL MEDICINE

## 2020-06-22 PROCEDURE — 77080 DXA BONE DENSITY AXIAL: CPT | Mod: TC

## 2020-06-22 PROCEDURE — 77080 DEXA BONE DENSITY SPINE HIP: ICD-10-PCS | Mod: 26,,, | Performed by: INTERNAL MEDICINE

## 2020-06-22 PROCEDURE — 3008F BODY MASS INDEX DOCD: CPT | Mod: CPTII,S$GLB,, | Performed by: OTOLARYNGOLOGY

## 2020-06-22 PROCEDURE — 99999 PR PBB SHADOW E&M-EST. PATIENT-LVL III: CPT | Mod: PBBFAC,,, | Performed by: OTOLARYNGOLOGY

## 2020-06-22 NOTE — PROGRESS NOTES
Subjective:      Mercedes Perez is a 60 y.o. female who was referred to me by Dr. Natasha Deng in consultation for dizziness.     Mrs. Perez presents to clinic for the evaluation of 2 month of intermittent dizziness.  She states that the dizziness occurss about once per week and lasts about 1-2 hours.  She describes it as a dysequilibrium sensation and denies any vertigo.  She states that when it happens she is often moving around and the only way to stop it is to sit down and let it pass.  She denies any associated auditory or vision changes, headaches, or nausea/ vomiting.  Denies any falls.    Mrs. Perez also reports ear pain only when she uses her air pod (headphones).  Otherwise she states her ears feel fine.  She denies any discharge from the ear or hearing loss.  She does report mild tinnitus for years.       Past Medical History  She has a past medical history of Allergy, UI (urinary incontinence), and Uterine fibroid.    Past Surgical History  She has no past surgical history on file.    Family History  Her family history includes Coronary artery disease in her paternal aunt; Diabetes in her maternal grandmother; Hypertension in her mother; Mitral valve prolapse in her mother; No Known Problems in her brother, father, maternal aunt, maternal grandfather, maternal uncle, paternal grandfather, paternal grandmother, paternal uncle, and sister.    Social History  She reports that she has never smoked. She has never used smokeless tobacco. She reports that she does not drink alcohol or use drugs.    Allergies  She is allergic to no known drug allergies.    Medications  She has a current medication list which includes the following prescription(s): calcium carbonate-vitamin d3, estradiol, and fluocinolone acetonide oil.    Review of Systems   Constitutional: Negative for activity change, appetite change, chills and fever.   HENT: Positive for ear pain (when wearing airpods) and tinnitus. Negative for congestion,  dental problem, drooling, ear discharge, facial swelling, hearing loss, mouth sores, rhinorrhea, sinus pain, sore throat, trouble swallowing and voice change.    Eyes: Negative for pain, discharge, redness and itching.   Respiratory: Negative for cough, chest tightness, shortness of breath and wheezing.    Cardiovascular: Negative for chest pain.   Gastrointestinal: Negative for nausea and vomiting.   Endocrine: Negative for cold intolerance and heat intolerance.   Neurological: Positive for dizziness. Negative for syncope, light-headedness and headaches.   Hematological: Negative for adenopathy.   Psychiatric/Behavioral: Negative for confusion and dysphoric mood. The patient is not nervous/anxious.           Objective:     Wt 43.2 kg (95 lb 3.8 oz)   BMI 19.90 kg/m²      Constitutional:   Vital signs are normal. She appears well-developed and well-nourished. She appears alert. She is cooperative.  Non-toxic appearance.     Head:  Normocephalic and atraumatic. Facial strength is normal.      Ears:    Right Ear: No lacerations. No drainage, swelling or tenderness. No foreign bodies. No mastoid tenderness. Tympanic membrane is not scarred, not perforated, not retracted and not bulging. Tympanic membrane mobility is normal. No PE tube. No hemotympanum. No decreased hearing is noted.   Left Ear: No lacerations. No drainage, swelling or tenderness. No foreign bodies. No mastoid tenderness. Tympanic membrane is not scarred, not perforated, not retracted and not bulging. Tympanic membrane mobility is normal.  No PE tube. No hemotympanum. No decreased hearing is noted.   Ears:      Nose:  No rhinorrhea, nose lacerations, sinus tenderness, septal deviation, nasal septal hematoma or polyps. Turbinates normal, no turbinate masses and no turbinate hypertrophy.  Right sinus exhibits no maxillary sinus tenderness and no frontal sinus tenderness. Left sinus exhibits no maxillary sinus tenderness and no frontal sinus tenderness.      Mouth/Throat  Oropharynx not clear and moist and abnormal uvula midline. Normal dentition. No uvula swelling. No oropharyngeal exudate, posterior oropharyngeal edema or posterior oropharyngeal erythema. Tonsils not present.      Neck:  Trachea normal, phonation normal, full range of motion with neck supple and no adenopathy.        Head (right side): No submental, no preauricular, no posterior auricular, no occipital and no supraclavicular adenopathy present.        Head (left side): No submental, no preauricular, no posterior auricular, no occipital and no supraclavicular adenopathy present.     She has no cervical adenopathy.        Right cervical: No superficial cervical, no deep cervical and no posterior cervical adenopathy present.       Left cervical: No superficial cervical, no deep cervical and no posterior cervical adenopathy present.     Pulmonary/Chest:   No accessory muscle usage. No apnea, no tachypnea and no bradypnea. No respiratory distress.     Neurological:   She is alert.       Procedure    None        Data Reviewed    WBC (K/uL)   Date Value   06/18/2020 3.90     Platelets (K/uL)   Date Value   06/18/2020 259      Creatinine (mg/dL)   Date Value   06/18/2020 0.7     TSH (uIU/mL)   Date Value   06/18/2020 3.303     Glucose (mg/dL)   Date Value   06/18/2020 102     Hemoglobin A1C (%)   Date Value   06/18/2020 5.5          Assessment:     1. Dizziness    2. Ear pain, bilateral         Plan:     I had a long discussion with the patient regarding her condition and the further workup and management options.      [facility] was seen today for dizziness.     Diagnoses and all orders for this visit:    Dizziness   - VNG and Audiogram with Audiology   -F/u with Dr. Puentes same day  Ear pain, bilateral  -     Ambulatory referral/consult to ENT    Patients ear canal noted to be narrow, this is likely the cause for the ear pain with her air pods.  Recommended a smaller size of air pods (or alternative brand) for  relief.

## 2020-06-22 NOTE — LETTER
June 22, 2020      Natasha Deng MD  [address] [place] [address]  [address]  [place] [address]           [facility] - Otorhinolaryngology  [address]  [facility] [address]  Phone: [phone]  Fax: [phone]          Patient: Mercedes Perez   MR Number: [id number]   YOB: 1960   Date of Visit: 6/22/2020       Dear Dr. Natasha Deng:    Thank you for referring Mercedes Perez to me for evaluation. Attached you will find relevant portions of my assessment and plan of care.    If you have questions, please do not hesitate to call me. I look forward to following Mercedes Perez along with you.    Sincerely,    Ari Puentes MD    Enclosure  CC:  No Recipients    If you would like to receive this communication electronically, please contact [email or web address] or [phone] to request more information on [employer] Link access.    For providers and/or their staff who would like to refer a patient to [facility], please contact us through our one-stop-shop provider referral line, [facility], at [phone].    If you feel you have received this communication in error or would no longer like to receive these types of communications, please e-mail [email or web address]

## 2020-07-09 ENCOUNTER — OFFICE VISIT (OUTPATIENT)
Dept: DERMATOLOGY | Facility: CLINIC | Age: 60
End: 2020-07-09
Payer: COMMERCIAL

## 2020-07-09 DIAGNOSIS — L81.9 DYSCHROMIA: ICD-10-CM

## 2020-07-09 DIAGNOSIS — L91.8 SKIN TAG: Primary | ICD-10-CM

## 2020-07-09 DIAGNOSIS — D23.9 INTRADERMAL NEVUS: ICD-10-CM

## 2020-07-09 PROCEDURE — 99202 OFFICE O/P NEW SF 15 MIN: CPT | Mod: S$GLB,,, | Performed by: PHYSICIAN ASSISTANT

## 2020-07-09 PROCEDURE — 99999 PR PBB SHADOW E&M-EST. PATIENT-LVL III: CPT | Mod: PBBFAC,,, | Performed by: PHYSICIAN ASSISTANT

## 2020-07-09 PROCEDURE — 99202 PR OFFICE/OUTPT VISIT, NEW, LEVL II, 15-29 MIN: ICD-10-PCS | Mod: S$GLB,,, | Performed by: PHYSICIAN ASSISTANT

## 2020-07-09 PROCEDURE — 99999 PR PBB SHADOW E&M-EST. PATIENT-LVL III: ICD-10-PCS | Mod: PBBFAC,,, | Performed by: PHYSICIAN ASSISTANT

## 2020-07-09 RX ORDER — FLUOCINOLONE ACETONIDE, HYDROQUINONE, AND TRETINOIN .1; 40; .5 MG/G; MG/G; MG/G
CREAM TOPICAL
Qty: 1 TUBE | Refills: 2 | Status: SHIPPED | OUTPATIENT
Start: 2020-07-09 | End: 2022-02-17

## 2020-07-09 NOTE — LETTER
July 9, 2020      Natasha Deng MD  [address] [place] [address]  [address]  [place] [address]           [facility] - Dermatology  [address]  [place] [address]  Phone: [phone]  Fax: [phone]          Patient: Mercedes Perez   MR Number: [id number]   YOB: 1960   Date of Visit: 7/9/2020       Dear Dr. Natasha Deng:    Thank you for referring Mercedes Perez to me for evaluation. Attached you will find relevant portions of my assessment and plan of care.    If you have questions, please do not hesitate to call me. I look forward to following Mercedes Perez along with you.    Sincerely,    Chastity Sethi PA-C    Enclosure  CC:  No Recipients    If you would like to receive this communication electronically, please contact [email or web address] or [phone] to request more information on [employer] Link access.    For providers and/or their staff who would like to refer a patient to [facility], please contact us through our one-stop-shop provider referral line, [facility] [place], at [phone].    If you feel you have received this communication in error or would no longer like to receive these types of communications, please e-mail [email or web address]

## 2020-07-09 NOTE — PROGRESS NOTES
Subjective:       Patient ID:  Mercedes Perez is a 60 y.o. female who presents for   Chief Complaint   Patient presents with    Mole     neck, right leg     Mole - Initial  Affected locations: right lower leg and neck  Duration: neck: many yrs + getting new smaller ones, le months.  Signs / symptoms: itching (mildly on leg - only occasionally)  Aggravated by: nothing  Relieving factors/Treatments tried: nothing    Pt also c/o dark spots on face x many yrs - interested in tx.    Review of Systems   Constitutional: Negative for fever.   Skin: Positive for activity-related sunscreen use. Negative for daily sunscreen use and recent sunburn.   Hematologic/Lymphatic: Does not bruise/bleed easily.        Objective:    Physical Exam   Constitutional: She appears well-developed and well-nourished. No distress.   Neurological: She is alert and oriented to person, place, and time. She is not disoriented.   Psychiatric: She has a normal mood and affect.   Skin:   Areas Examined (abnormalities noted in diagram):   Head / Face Inspection Performed  Neck Inspection Performed  Chest / Axilla Inspection Performed  RLE Inspected                   Diagram Legend     Erythematous scaling macule/papule c/w actinic keratosis       Vascular papule c/w angioma      Pigmented verrucoid papule/plaque c/w seborrheic keratosis      Yellow umbilicated papule c/w sebaceous hyperplasia      Irregularly shaped tan macule c/w lentigo     1-2 mm smooth white papules consistent with Milia      Movable subcutaneous cyst with punctum c/w epidermal inclusion cyst      Subcutaneous movable cyst c/w pilar cyst      Firm pink to brown papule c/w dermatofibroma      Pedunculated fleshy papule(s) c/w skin tag(s)      Evenly pigmented macule c/w junctional nevus     Mildly variegated pigmented, slightly irregular-bordered macule c/w mildly atypical nevus      Flesh colored to evenly pigmented papule c/w intradermal nevus       Pink pearly papule/plaque  c/w basal cell carcinoma      Erythematous hyperkeratotic cursted plaque c/w SCC      Surgical scar with no sign of skin cancer recurrence      Open and closed comedones      Inflammatory papules and pustules      Verrucoid papule consistent consistent with wart     Erythematous eczematous patches and plaques     Dystrophic onycholytic nail with subungual debris c/w onychomycosis     Umbilicated papule    Erythematous-base heme-crusted tan verrucoid plaque consistent with inflamed seborrheic keratosis     Erythematous Silvery Scaling Plaque c/w Psoriasis     See annotation      Assessment / Plan:      Skin tags  Reassurance given to patient. No treatment is necessary.   Treatment of benign, asymptomatic lesions may be considered cosmetic.    Dyschromia  -     fluocinolone-hydroq.-tretinoin (TRI-CHAS) 0.01-4-0.05 % Crea; AAA face qohs x 2 wks then increase to qhs as tolerated.  Dispense: 1 Tube; Refill: 2    Patient instructed in importance in daily sun protection of at least spf 30. Sun avoidance and topical protection discussed.     Notified patient of common potential side effects such as dryness, redness, peeling, or mild skin irritation. The patient was counseled to use a pea-sized amount prior to bedtime on the entire face. Start medication every other night until the patient develops tolerance, then increase to nightly use. The patient was encouraged to use gentle emollients in conjunction with this medication and temporarily hold medication if severe skin irritation occurs.      Intradermal nevus  Discussed ABCDE's of nevi.  Monitor for new mole or moles that are becoming bigger, darker, irritated, or developing irregular borders.        Follow up in about 3 months (around 10/9/2020).

## 2020-07-09 NOTE — PATIENT INSTRUCTIONS
Use compounded cream on face for 3 months then discontinue.  Patient instructed in importance in daily sun protection of at least spf 30. Sun avoidance and topical protection discussed.         RETINOIDS           Your doctor has prescribed a topical retinoid for your skin. A retinoid is a vitamin A derived product used to treat a variety of skin conditions including acne, actinic keratoses (pre-skin cancers), uneven pigmentation from sun damage, fine lines and wrinkles, and enlarged pores.    How do they work?         Retinoids increase skin cell turn over from the normal 30 days to five or six days, minimizing clogged pores-the major factor in acne. Retinoids can also repair the DNA in cells damaged by the sun helping to even out skin pigmentation and clear pre-skin cancers. They can shrink oil glands and minimize the appearance of large pores. These effects can not be appreciated unless the medication is used on a consistent basis!    How do I use a retinoid?         After washing with a mild cleanser (Purpose, Aqua glycolic face cleanser, Cetaphil, Neutrogena deep cream cleanser), the skin should be moisturized with a non-retinol containing moisturizer such as Cerave PM. Then a thin layer of medication is applied to the forehead, nose cheeks, and chin (and around eyes if treating fine lines and wrinkles) at night. The amount of medication needed to cover the entire face should be no more than the size of a green pea. Irritation around the eyes can be treated with Vaseline at night.    What if my skin appears dry, red, and is peeling?          Retinoids do not cause dry skin but rather they cause the top layer of the skin the shed, giving an appearance of dry skin. In fact, new healthy skin cells are replacing older, damaged cells on the surface. This usually occurs the first 2-4 weeks as the skin is adjusting to the medication. It is reasonable to use the medication every other night or even every 2 nights until  your skin adjusts. You can use a MILD exfoliant to remove the peeling skin (Aveeno daily clarifying pads, Aqua glycolic face cream) and can apply a moisturizer throughout the day as needed. Retinoids come in a variety of strengths and vehicles and your doctor can find one best for you. If you cannot tolerate prescription strength retinoids, over the counter products with retinol may be beneficial. (Olay ProX wrinkle cream, MARISSA deep wrinkle cream, Green Cream at [facility])    Will my skin be more sensitive in the sun?           You will need to use sunscreen with SPF 30 daily. Retinoids will cause the outermost layer of the skin to be thinner and thus more sensitive to ultraviolet rays. However, remember that over time, retinoids actually make the skin thicker by enhancing collagen deposition which protects the skin from sun damage.    When will I see results?            If you are using a retinoid for acne, you should see improvement in 6-8 weeks. Do not be alarmed if you find that your acne gets worse before it gets better-KEEP USING THE MEDICATION- this is a normal response and your acne will improve if you can stick with it.           If you are using the medication for anti-aging and skin dyspigmentation, you may see results in 3 months, but most effects are not visible until 6 months. Retinoids are clinically proven to reverse signs of aging, but only if used on a CONSTISTENT BASIS!             Remember that retinoids should not be used if you are pregnant.           Discontinue use 1 week prior to waxing, as skin is more likely to tear.

## 2020-08-04 ENCOUNTER — CLINICAL SUPPORT (OUTPATIENT)
Dept: AUDIOLOGY | Facility: CLINIC | Age: 60
End: 2020-08-04
Payer: COMMERCIAL

## 2020-08-04 DIAGNOSIS — H81.392 PERIPHERAL VERTIGO, LEFT: Primary | ICD-10-CM

## 2020-08-04 DIAGNOSIS — Z01.10 NORMAL HEARING EXAM: Primary | ICD-10-CM

## 2020-08-04 PROCEDURE — 92557 PR COMPREHENSIVE HEARING TEST: ICD-10-PCS | Mod: S$GLB,,, | Performed by: AUDIOLOGIST

## 2020-08-04 PROCEDURE — 92540 PR VESTIBULAR EVAL NYSTAG FOVL&PERPH STIM OSCIL TRACKING: ICD-10-PCS | Mod: S$GLB,,, | Performed by: AUDIOLOGIST

## 2020-08-04 PROCEDURE — 92540 BASIC VESTIBULAR EVALUATION: CPT | Mod: S$GLB,,, | Performed by: AUDIOLOGIST

## 2020-08-04 PROCEDURE — 92557 COMPREHENSIVE HEARING TEST: CPT | Mod: S$GLB,,, | Performed by: AUDIOLOGIST

## 2020-08-04 PROCEDURE — 92538 CALORIC VSTBLR TEST W/REC: CPT | Mod: S$GLB,,, | Performed by: AUDIOLOGIST

## 2020-08-04 PROCEDURE — 92538 PR CALORIC VSTBLR TEST W/REC MONOTHERMAL: ICD-10-PCS | Mod: S$GLB,,, | Performed by: AUDIOLOGIST

## 2020-08-04 PROCEDURE — 92567 TYMPANOMETRY: CPT | Mod: S$GLB,,, | Performed by: AUDIOLOGIST

## 2020-08-04 PROCEDURE — 92567 PR TYMPA2METRY: ICD-10-PCS | Mod: S$GLB,,, | Performed by: AUDIOLOGIST

## 2020-08-04 NOTE — PROGRESS NOTES
VNG Evaluation    Referring physician:  Dr. Puentes    60 y.o. female complains of imbalance.  Symptoms are not provoked by any specific movement and have been recurring over the past 4-5 months.  Ms. Perez stated that each episode lasts for seconds at a time.  She denied taking any medication that would affect today's test results.    Gaze nystagmus was absent.    Oculomotor function was abnormal: smooth pursuit gain.    Spontaneous nystagmus was absent.    The head-hanging left Hallpike was negative.    The head-hanging right Hallpike was negative.    Static positional nystagmus was absent.    Unilateral weakness: 24% (left)  Directional preponderance: n/a for monothermal irrigations    RW: 26 d/s  LW: 16 d/s    Fixation suppression was abnormal for left warm caloric irrigation.    Cool water caloric irrigations were unable to be completed today.  Ms. Perez was extremely uncomfortable during warm water caloric irrigations and did not feel well following the irrigations.  She opted to discontinue testing at that time.    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    Recommend: 1. Formal vestibular/balance rehab. 2. Referral to Neurology to rule out a central component.  3. Follow-up with Dr. Puentes to review today's results.

## 2020-08-04 NOTE — PROGRESS NOTES
Mercedes Nanette Perez was seen today in the clinic for an audiologic evaluation.  Pts main complaint was intermittent unsteadiness.      Tympanometry revealed Type A in the right ear and Type A in the left ear.  Audiogram results revealed normal hearing bilaterally.  Speech reception thresholds were noted at 5 dB in the right ear and 5 dB in the left ear.  Speech discrimination scores were 92% in the right ear and 96% in the left ear.    Recommendations:  1. Otologic evaluation  2. Annual audiogram  3. Noise protection when in noise  4.   Continue with scheduled VNG

## 2020-08-05 ENCOUNTER — OFFICE VISIT (OUTPATIENT)
Dept: OTOLARYNGOLOGY | Facility: CLINIC | Age: 60
End: 2020-08-05
Payer: COMMERCIAL

## 2020-08-05 VITALS — WEIGHT: 95.88 LBS | BODY MASS INDEX: 20.04 KG/M2

## 2020-08-05 DIAGNOSIS — R42 MIGRAINOUS DIZZINESS: Primary | ICD-10-CM

## 2020-08-05 PROCEDURE — 3008F PR BODY MASS INDEX (BMI) DOCUMENTED: ICD-10-PCS | Mod: CPTII,S$GLB,, | Performed by: OTOLARYNGOLOGY

## 2020-08-05 PROCEDURE — 99999 PR PBB SHADOW E&M-EST. PATIENT-LVL II: CPT | Mod: PBBFAC,,, | Performed by: OTOLARYNGOLOGY

## 2020-08-05 PROCEDURE — 99999 PR PBB SHADOW E&M-EST. PATIENT-LVL II: ICD-10-PCS | Mod: PBBFAC,,, | Performed by: OTOLARYNGOLOGY

## 2020-08-05 PROCEDURE — 3008F BODY MASS INDEX DOCD: CPT | Mod: CPTII,S$GLB,, | Performed by: OTOLARYNGOLOGY

## 2020-08-05 PROCEDURE — 99215 PR OFFICE/OUTPT VISIT, EST, LEVL V, 40-54 MIN: ICD-10-PCS | Mod: S$GLB,,, | Performed by: OTOLARYNGOLOGY

## 2020-08-05 PROCEDURE — 99215 OFFICE O/P EST HI 40 MIN: CPT | Mod: S$GLB,,, | Performed by: OTOLARYNGOLOGY

## 2020-09-11 NOTE — PROGRESS NOTES
Subjective:      Mercedes Perez is a 60 y.o. female who was referred to me by Dr. Natasha Deng in consultation for dizziness.     Mrs. Perez presents to clinic for the evaluation of 2 month of intermittent dizziness.  She states that the dizziness occurss about once per week and lasts about 1-2 hours.  She describes it as a dysequilibrium sensation and denies any vertigo.  She states that when it happens she is often moving around and the only way to stop it is to sit down and let it pass.  She denies any associated auditory or vision changes, headaches, or nausea/ vomiting.  Denies any falls.    Mrs. Perez also reports ear pain only when she uses her air pod (headphones).  Otherwise she states her ears feel fine.  She denies any discharge from the ear or hearing loss.  She does report mild tinnitus for years.     8/5/20: The patient underwent an audiogram and VNG. Audiogram WNL. VNG largely non focal, but concerning for vestibular migraine. She reports that she is actually doing better from vertigo standpoint. She does report having headaches, specially within frontal region and left temple.     Past Medical History  She has a past medical history of Allergy, UI (urinary incontinence), and Uterine fibroid.    Past Surgical History  She has no past surgical history on file.    Family History  Her family history includes Coronary artery disease in her paternal aunt; Diabetes in her maternal grandmother; Hypertension in her mother; Mitral valve prolapse in her mother; No Known Problems in her brother, father, maternal aunt, maternal grandfather, maternal uncle, paternal grandfather, paternal grandmother, paternal uncle, and sister.    Social History  She reports that she has never smoked. She has never used smokeless tobacco. She reports that she does not drink alcohol or use drugs.    Allergies  She is allergic to no known drug allergies.    Medications  She has a current medication list which includes the  following prescription(s): calcium carbonate-vitamin d3, estradiol, fluocinolone acetonide oil, and tri-mary jo.    Review of Systems   Constitutional: Negative for activity change, appetite change, chills and fever.   HENT: Positive for ear pain (when wearing airpods) and tinnitus. Negative for congestion, dental problem, drooling, ear discharge, facial swelling, hearing loss, mouth sores, rhinorrhea, sinus pain, sore throat, trouble swallowing and voice change.    Eyes: Negative for pain, discharge, redness and itching.   Respiratory: Negative for cough, chest tightness, shortness of breath and wheezing.    Cardiovascular: Negative for chest pain.   Gastrointestinal: Negative for nausea and vomiting.   Endocrine: Negative for cold intolerance and heat intolerance.   Neurological: Positive for dizziness, but improving. Negative for syncope, light-headedness and headaches.   Hematological: Negative for adenopathy.   Psychiatric/Behavioral: Negative for confusion and dysphoric mood. The patient is not nervous/anxious.           Objective:     Wt 43.5 kg (95 lb 14.4 oz)   BMI 20.04 kg/m²      Constitutional:   Vital signs are normal. She appears well-developed and well-nourished. She appears alert. She is cooperative.  Non-toxic appearance.     Head:  Normocephalic and atraumatic. Facial strength is normal.      Ears:    Right Ear: No lacerations. No drainage, swelling or tenderness. No foreign bodies. No mastoid tenderness. Tympanic membrane is not scarred, not perforated, not retracted and not bulging. Tympanic membrane mobility is normal. No PE tube. No hemotympanum. No decreased hearing is noted.   Left Ear: No lacerations. No drainage, swelling or tenderness. No foreign bodies. No mastoid tenderness. Tympanic membrane is not scarred, not perforated, not retracted and not bulging. Tympanic membrane mobility is normal.  No PE tube. No hemotympanum. No decreased hearing is noted.   Ears:      Nose:  No rhinorrhea,  nose lacerations, sinus tenderness, septal deviation, nasal septal hematoma or polyps. Turbinates normal, no turbinate masses and no turbinate hypertrophy.  Right sinus exhibits no maxillary sinus tenderness and no frontal sinus tenderness. Left sinus exhibits no maxillary sinus tenderness and no frontal sinus tenderness.     Mouth/Throat  Oropharynx not clear and moist and abnormal uvula midline. Normal dentition. No uvula swelling. No oropharyngeal exudate, posterior oropharyngeal edema or posterior oropharyngeal erythema. Tonsils not present.      Neck:  Trachea normal, phonation normal, full range of motion with neck supple and no adenopathy.        Head (right side): No submental, no preauricular, no posterior auricular, no occipital and no supraclavicular adenopathy present.        Head (left side): No submental, no preauricular, no posterior auricular, no occipital and no supraclavicular adenopathy present.     She has no cervical adenopathy.        Right cervical: No superficial cervical, no deep cervical and no posterior cervical adenopathy present.       Left cervical: No superficial cervical, no deep cervical and no posterior cervical adenopathy present.     Pulmonary/Chest:   No accessory muscle usage. No apnea, no tachypnea and no bradypnea. No respiratory distress.     Neurological:   She is alert.       Procedure    None        Data Reviewed    WBC (K/uL)   Date Value   06/18/2020 3.90     Platelets (K/uL)   Date Value   06/18/2020 259      Creatinine (mg/dL)   Date Value   06/18/2020 0.7     TSH (uIU/mL)   Date Value   06/18/2020 3.303     Glucose (mg/dL)   Date Value   06/18/2020 102     Hemoglobin A1C (%)   Date Value   06/18/2020 5.5            VNG Evaluation     Referring physician:  Dr. Puentes     60 y.o. female complains of imbalance.  Symptoms are not provoked by any specific movement and have been recurring over the past 4-5 months.  Ms. Perez stated that each episode lasts for seconds at a  time.  She denied taking any medication that would affect today's test results.     Gaze nystagmus was absent.     Oculomotor function was abnormal: smooth pursuit gain.     Spontaneous nystagmus was absent.     The head-hanging left Hallpike was negative.     The head-hanging right Hallpike was negative.     Static positional nystagmus was absent.     Unilateral weakness: 24% (left)  Directional preponderance: n/a for monothermal irrigations     RW: 26 d/s  LW: 21 d/s     Fixation suppression was abnormal for left warm caloric irrigation.     Cool water caloric irrigations were unable to be completed today.  Ms. Perez was extremely uncomfortable during warm water caloric irrigations and did not feel well following the irrigations.  She opted to discontinue testing at that time.     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     Recommend: 1. Formal vestibular/balance rehab. 2. Referral to Neurology to rule out a central component.  3. Follow-up with Dr. Puentes to review today's results.          Assessment:     1. Mercedes was seen today for dizziness.    Diagnoses and all orders for this visit:    Migrainous dizziness              Plan:     I had a long discussion with the patient regarding her condition and the further workup and management options.      Migraine diet/ Handout.    Mg++ Citrate 400 bid    -Education and reassurance provided  -RTC PRN        No

## 2020-12-09 ENCOUNTER — OFFICE VISIT (OUTPATIENT)
Dept: FAMILY MEDICINE | Facility: CLINIC | Age: 60
End: 2020-12-09
Payer: COMMERCIAL

## 2020-12-09 VITALS
HEIGHT: 58 IN | SYSTOLIC BLOOD PRESSURE: 132 MMHG | BODY MASS INDEX: 20.45 KG/M2 | HEART RATE: 69 BPM | DIASTOLIC BLOOD PRESSURE: 80 MMHG | WEIGHT: 97.44 LBS | OXYGEN SATURATION: 99 % | TEMPERATURE: 99 F

## 2020-12-09 DIAGNOSIS — M79.2 RADICULAR PAIN IN LEFT ARM: ICD-10-CM

## 2020-12-09 DIAGNOSIS — M25.512 ACUTE PAIN OF LEFT SHOULDER: Primary | ICD-10-CM

## 2020-12-09 PROCEDURE — 3008F PR BODY MASS INDEX (BMI) DOCUMENTED: ICD-10-PCS | Mod: CPTII,S$GLB,, | Performed by: FAMILY MEDICINE

## 2020-12-09 PROCEDURE — 99214 OFFICE O/P EST MOD 30 MIN: CPT | Mod: S$GLB,,, | Performed by: FAMILY MEDICINE

## 2020-12-09 PROCEDURE — 99999 PR PBB SHADOW E&M-EST. PATIENT-LVL III: ICD-10-PCS | Mod: PBBFAC,,, | Performed by: FAMILY MEDICINE

## 2020-12-09 PROCEDURE — 93010 EKG 12-LEAD: ICD-10-PCS | Mod: S$GLB,,, | Performed by: INTERNAL MEDICINE

## 2020-12-09 PROCEDURE — 3079F DIAST BP 80-89 MM HG: CPT | Mod: CPTII,S$GLB,, | Performed by: FAMILY MEDICINE

## 2020-12-09 PROCEDURE — 99999 PR PBB SHADOW E&M-EST. PATIENT-LVL III: CPT | Mod: PBBFAC,,, | Performed by: FAMILY MEDICINE

## 2020-12-09 PROCEDURE — 3075F SYST BP GE 130 - 139MM HG: CPT | Mod: CPTII,S$GLB,, | Performed by: FAMILY MEDICINE

## 2020-12-09 PROCEDURE — 99214 PR OFFICE/OUTPT VISIT, EST, LEVL IV, 30-39 MIN: ICD-10-PCS | Mod: S$GLB,,, | Performed by: FAMILY MEDICINE

## 2020-12-09 PROCEDURE — 1125F AMNT PAIN NOTED PAIN PRSNT: CPT | Mod: S$GLB,,, | Performed by: FAMILY MEDICINE

## 2020-12-09 PROCEDURE — 3079F PR MOST RECENT DIASTOLIC BLOOD PRESSURE 80-89 MM HG: ICD-10-PCS | Mod: CPTII,S$GLB,, | Performed by: FAMILY MEDICINE

## 2020-12-09 PROCEDURE — 93005 ELECTROCARDIOGRAM TRACING: CPT | Mod: S$GLB,,, | Performed by: FAMILY MEDICINE

## 2020-12-09 PROCEDURE — 3008F BODY MASS INDEX DOCD: CPT | Mod: CPTII,S$GLB,, | Performed by: FAMILY MEDICINE

## 2020-12-09 PROCEDURE — 3075F PR MOST RECENT SYSTOLIC BLOOD PRESS GE 130-139MM HG: ICD-10-PCS | Mod: CPTII,S$GLB,, | Performed by: FAMILY MEDICINE

## 2020-12-09 PROCEDURE — 93005 EKG 12-LEAD: ICD-10-PCS | Mod: S$GLB,,, | Performed by: FAMILY MEDICINE

## 2020-12-09 PROCEDURE — 1125F PR PAIN SEVERITY QUANTIFIED, PAIN PRESENT: ICD-10-PCS | Mod: S$GLB,,, | Performed by: FAMILY MEDICINE

## 2020-12-09 PROCEDURE — 93010 ELECTROCARDIOGRAM REPORT: CPT | Mod: S$GLB,,, | Performed by: INTERNAL MEDICINE

## 2020-12-09 RX ORDER — CYCLOBENZAPRINE HCL 5 MG
5 TABLET ORAL 3 TIMES DAILY PRN
Qty: 60 TABLET | Refills: 1 | Status: SHIPPED | OUTPATIENT
Start: 2020-12-09 | End: 2022-02-17

## 2020-12-09 RX ORDER — GABAPENTIN 100 MG/1
100 CAPSULE ORAL 3 TIMES DAILY
Qty: 90 CAPSULE | Refills: 11 | Status: SHIPPED | OUTPATIENT
Start: 2020-12-09 | End: 2022-02-17

## 2020-12-09 NOTE — PROGRESS NOTES
Office Visit    Patient Name: Mercedes Perez    : 1960  MRN: [id number]      Assessment/Plan:  Mercedes Perez is a 60 y.o. female who presents today for :    Acute pain of left shoulder  Radicular pain in left arm  -     cyclobenzaprine (FLEXERIL) 5 MG tablet; Take 1 tablet (5 mg total) by mouth 3 (three) times daily as needed for Muscle spasms.  Dispense: 60 tablet; Refill: 1  -     gabapentin (NEURONTIN) 100 MG capsule; Take 1 capsule (100 mg total) by mouth 3 (three) times daily.  Dispense: 90 capsule; Refill: 11  -mild, activity modification d/w patient: avoid reaching overhead activities, avoid heavy lifting and provocative movements, alternate ice/heat/deep massage. shoulder ROM stretching and strengthening exercises demonstrated in clinic and handouts given to patient  -RTC in 4-6 weeks if not better, or sooner if pain worsens.  -patient advised to use OTC Tylenol (325mg tablets) once every 4-6 hours as needed for pain       Follow up for worsening Sx.       This note was created by combination of typed  and MModal dictation.  Transcription errors may be present.  If there are any questions, please contact me.        ----------------------------------------------------------------------------------------------------------------------      HPI:  Patient Care Team:  Natasha Deng MD as PCP - General (Family Medicine)  Aleks Beasley MA as Care Coordinator    Mercedes is a 60 y.o. 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 She states pain occasionally shoots down from her shoulder with certain movements to the tip of her middle finger and thumb/index finger and causes her mild tingling. She denies any wrist pain.       Additional ROS    No CP/SOB/palpitations/swelling  No cough/wheezing/SOB/CP/palpitations  No nausea/vomiting/abd pain  No rash, no history of allergies to any specific substances              Patient Active Problem List   Diagnosis    Vaginal atrophy    Mixed incontinence urge and stress (male)(female)    Urgency of urination    Nocturia    Pelvic floor weakness    Post-menopausal    Age-related osteoporosis without current pathological fracture       Current Medications  Medications reviewed/updated.     Current Outpatient Medications on File Prior to Visit   Medication Sig Dispense Refill    calcium carbonate-vitamin D3 1,000 mg(2,500 mg)-800 unit Tab Take by mouth once daily.       fluocinolone acetonide oil 0.01 % Drop INSTILL 5 DROPS INTO EAR(S) ONCE DAILY AS NEEDED 20 mL 0    fluocinolone-hydroq.-tretinoin (TRI-CHAS) 0.01-4-0.05 % Crea AAA face qohs x 2 wks then increase to qhs as tolerated. 1 Tube 2    estradiol (ESTRACE) 0.01 % (0.1 mg/gram) vaginal cream Place 0.5 g vaginally twice a week. 42.5 g 11     No current facility-administered medications on file prior to visit.        History reviewed. No pertinent surgical history.    Family History   Problem Relation Age of Onset    Hypertension Mother     Mitral valve prolapse Mother     Diabetes Maternal Grandmother     Coronary artery disease Paternal Aunt     No Known Problems Father     No Known Problems Sister     No Known Problems Brother     No Known Problems Maternal Aunt     No Known Problems Maternal Uncle     No Known Problems Paternal Uncle     No Known Problems Maternal Grandfather     No Known Problems Paternal Grandmother     No Known  "Problems Paternal Grandfather     Amblyopia Neg Hx     Blindness Neg Hx     Cancer Neg Hx     Cataracts Neg Hx     Glaucoma Neg Hx     Macular degeneration Neg Hx     Retinal detachment Neg Hx     Strabismus Neg Hx     Stroke Neg Hx     Thyroid disease Neg Hx     Melanoma Neg Hx     Breast cancer Neg Hx     Ovarian cancer Neg Hx     Cervical cancer Neg Hx     Endometrial cancer Neg Hx     Vaginal cancer Neg Hx        Social History     Socioeconomic History    Marital status:      Spouse name: Not on file    Number of children: Not on file    Years of education: Not on file    Highest education level: Not on file   Occupational History    Not on file   Social Needs    Financial resource strain: Not on file    Food insecurity     Worry: Not on file     Inability: Not on file    Transportation needs     Medical: Not on file     Non-medical: Not on file   Tobacco Use    Smoking status: Never Smoker    Smokeless tobacco: Never Used   Substance and Sexual Activity    Alcohol use: No    Drug use: No    Sexual activity: Not Currently     Birth control/protection: Post-menopausal   Lifestyle    Physical activity     Days per week: Not on file     Minutes per session: Not on file    Stress: Not on file   Relationships    Social connections     Talks on phone: Not on file     Gets together: Not on file     Attends Confucianism service: Not on file     Active member of club or organization: Not on file     Attends meetings of clubs or organizations: Not on file     Relationship status: Not on file   Other Topics Concern    Are you pregnant or think you may be? Not Asked    Breast-feeding Not Asked   Social History Narrative    Not on file           Allergies   Review of patient's allergies indicates:   Allergen Reactions    No known drug allergies              Review of Systems  See HPI      Physical Exam  /80   Pulse 69   Temp 98.7 °F (37.1 °C)   Ht 4' 10" (1.473 m)   Wt 44.2 " kg (97 lb 7.1 oz)   SpO2 99%   BMI 20.37 kg/m²     GEN: NAD, well developed, pleasant, well nourished  HEENT: NCAT, PERRLA, EOMI, sclera clear, anicteric  NECK: normal  LUNGS: CTAB, no w/r/r, no increased work of breathing   HEART: RRR, normal S1 and S2, no m/r/g, no edema  ABD: s/nt/nd, NABS  SKIN: normal turgor, no rashes  PSYCH: AOx3, appropriate mood and affect  MSK: warm/well perfused, normal ROM in all extremities except as noted below, no c/c  SHOULDER: R shoulder with FROM.  L deltoid with +mild muscle spasm, as well as LUE radicular pain with neck hyperextension radiating down to the tips of L index. Otherwise normal shoulder flexion/extention/abduction/adduction/empty can/push off/internal and external rotation. No swelling/erythema/TTP.

## 2020-12-10 ENCOUNTER — OFFICE VISIT (OUTPATIENT)
Dept: URGENT CARE | Facility: CLINIC | Age: 60
End: 2020-12-10
Payer: COMMERCIAL

## 2020-12-10 DIAGNOSIS — Z02.83 ENCOUNTER FOR DRUG SCREENING: ICD-10-CM

## 2020-12-10 DIAGNOSIS — R20.2 PARESTHESIAS IN LEFT HAND: ICD-10-CM

## 2020-12-10 DIAGNOSIS — M25.512 ACUTE PAIN OF LEFT SHOULDER: Primary | ICD-10-CM

## 2020-12-10 DIAGNOSIS — M79.2 RADICULAR PAIN IN LEFT ARM: ICD-10-CM

## 2020-12-10 LAB — BREATH ALCOHOL: 0

## 2020-12-10 PROCEDURE — 80305 DRUG TEST PRSMV DIR OPT OBS: CPT | Mod: S$GLB,,, | Performed by: EMERGENCY MEDICINE

## 2020-12-10 PROCEDURE — 73030 XR SHOULDER COMPLETE 2 OR MORE VIEWS LEFT: ICD-10-PCS | Mod: FY,LT,S$GLB, | Performed by: RADIOLOGY

## 2020-12-10 PROCEDURE — 80305 OOH NON-DOT DRUG SCREEN: ICD-10-PCS | Mod: S$GLB,,, | Performed by: EMERGENCY MEDICINE

## 2020-12-10 PROCEDURE — 82075 POCT BREATH ALCOHOL TEST: ICD-10-PCS | Mod: S$GLB,,, | Performed by: EMERGENCY MEDICINE

## 2020-12-10 PROCEDURE — 72040 XR CERVICAL SPINE 2 OR 3 VIEWS: ICD-10-PCS | Mod: FY,S$GLB,, | Performed by: RADIOLOGY

## 2020-12-10 PROCEDURE — 72040 X-RAY EXAM NECK SPINE 2-3 VW: CPT | Mod: FY,S$GLB,, | Performed by: RADIOLOGY

## 2020-12-10 PROCEDURE — 82075 ASSAY OF BREATH ETHANOL: CPT | Mod: S$GLB,,, | Performed by: EMERGENCY MEDICINE

## 2020-12-10 PROCEDURE — 99203 PR OFFICE/OUTPT VISIT, NEW, LEVL III, 30-44 MIN: ICD-10-PCS | Mod: S$GLB,,, | Performed by: EMERGENCY MEDICINE

## 2020-12-10 PROCEDURE — 73030 X-RAY EXAM OF SHOULDER: CPT | Mod: FY,LT,S$GLB, | Performed by: RADIOLOGY

## 2020-12-10 PROCEDURE — 99203 OFFICE O/P NEW LOW 30 MIN: CPT | Mod: S$GLB,,, | Performed by: EMERGENCY MEDICINE

## 2020-12-10 RX ORDER — MELOXICAM 15 MG/1
15 TABLET ORAL DAILY
Qty: 21 TABLET | Refills: 0 | Status: SHIPPED | OUTPATIENT
Start: 2020-12-10 | End: 2020-12-31

## 2020-12-10 NOTE — PROGRESS NOTES
Subjective:       Patient ID: Mercedes Perez is a 60 y.o. female.    Chief Complaint: Hand Pain (left middle and index)    Pt is here for a  WC visit left shoulder, arm middle and index fingers DOI: 12/1/20 rates pain 7/10 pt states she is having tingling in the left index finger and middle finger after returning to work from vacations. 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  Patient saw her primary care doctor yesterday for similar symptoms and was prescribed gabapentin as well as Flexeril.  She also had an EKG at that point which was normal.  Patient denies any neck pain or neck stiffness and denies any elbow or wrist pain.  She states that the pain starts in her left shoulder and trapezius region and radiates to her left arm.    Hand Pain   The incident occurred more than 1 week ago. The incident occurred at work. There was no injury mechanism. The pain is present in the left hand, left forearm and left fingers. The quality of the pain is described as aching. The pain does not radiate. The pain is at a severity of 7/10. The pain is moderate. The pain has been constant since the incident. Associated symptoms include numbness and tingling. Pertinent negatives include no chest pain or muscle weakness. Nothing aggravates the symptoms. She has tried NSAIDs for the symptoms. The treatment provided mild relief.       Constitution: Negative for chills, fatigue and fever.   HENT: Negative for congestion and sore throat.    Neck: Negative for painful lymph nodes.   Cardiovascular: Negative for chest pain and leg swelling.   Eyes: Negative for double vision and blurred vision.   Respiratory: Negative for cough and shortness of breath.    Gastrointestinal: Negative for nausea, vomiting and diarrhea.   Genitourinary: Negative for dysuria, frequency, urgency and  history of kidney stones.   Musculoskeletal: Negative for joint pain, joint swelling, muscle cramps and muscle ache.   Skin: Negative for color change, pale, rash, erythema and bruising.   Allergic/Immunologic: Negative for seasonal allergies.   Neurological: Positive for numbness. Negative for dizziness, history of vertigo, light-headedness, passing out and headaches.   Hematologic/Lymphatic: Negative for swollen lymph nodes.   Psychiatric/Behavioral: Negative for nervous/anxious, sleep disturbance and depression. The patient is not nervous/anxious.         Objective:      Physical Exam  Vitals signs and nursing note reviewed.   Constitutional:       Appearance: She is well-developed.   HENT:      Head: Normocephalic and atraumatic. No abrasion, contusion or laceration.      Right Ear: External ear normal.      Left Ear: External ear normal.      Nose: Nose normal.   Eyes:      General: Lids are normal.      Conjunctiva/sclera: Conjunctivae normal.      Pupils: Pupils are equal, round, and reactive to light.   Neck:      Musculoskeletal: Full passive range of motion without pain and neck supple.      Trachea: Trachea and phonation normal.   Cardiovascular:      Rate and Rhythm: Normal rate and regular rhythm.      Heart sounds: Normal heart sounds.   Pulmonary:      Effort: Pulmonary effort is normal. No respiratory distress.      Breath sounds: Normal breath sounds. No stridor.   Musculoskeletal:      Left shoulder: She exhibits decreased range of motion, tenderness and pain. She exhibits no bony tenderness, no swelling, no effusion and no spasm.        Arms:         Hands:    Skin:     General: Skin is warm and dry.      Capillary Refill: Capillary refill takes less than 2 seconds.      Findings: No abrasion, bruising, burn, ecchymosis, erythema, laceration, lesion or rash.   Neurological:      Mental Status: She is alert and oriented to person, place, and time.   Psychiatric:         Speech: Speech normal.          Behavior: Behavior normal.         Thought Content: Thought content normal.         Judgment: Judgment normal.       X-ray Shoulder 2 Or More Views Left    Result Date: 12/10/2020  EXAMINATION: XR SHOULDER COMPLETE 2 OR MORE VIEWS LEFT CLINICAL HISTORY: Pain in left shoulder TECHNIQUE: Two or three views of the left shoulder were performed. COMPARISON: None FINDINGS: Bone, joint soft tissues normal.     No fracture dislocation Electronically signed by: Valentin Harris MD Date:    12/10/2020 Time:    12:18    Xr Cervical Spine 2 Or 3 Views    Result Date: 12/10/2020  EXAMINATION: XR CERVICAL SPINE 2 OR 3 VIEWS CLINICAL HISTORY: Pain in left shoulder TECHNIQUE: AP, lateral and open mouth views of the cervical spine were performed. COMPARISON: None. FINDINGS: Minimal mild levoscoliosis cervicothoracic junction.  C1-C2, airway, soft tissues normal.  Straightening of cervical spine spinal usual lordotic curve.     Neck muscle spasm.  No fracture subluxation Electronically signed by: Valentin Harris MD Date:    12/10/2020 Time:    12:17      Assessment:       1. Acute pain of left shoulder    2. Radicular pain in left arm    3. Paresthesias in left hand        Plan:         Patient with what appears to be repetitive use syndrome of the left shoulder with possible impingement radicular symptoms to the left upper extremity.  xrays negative of the cspine and shoulder.  She already was prescribed gabapentin as well as Flexeril which she will take at night and I have added an anti-inflammatory meloxicam to take in the morning with food.  She has been placed on restrictions to the left upper extremity and lifting and will return in 1 week.  I have reviewed the intra locks face sheet stating that all physical therapy referrals go to [facility] therefore referred to physical therapy as well.  Authorization pending.    Medications Ordered This Encounter   Medications    meloxicam (MOBIC) 15 MG tablet     Sig:  Take 1 tablet (15 mg total) by mouth once daily. for 21 days     Dispense:  21 tablet     Refill:  0     Patient Instructions: Attention not to aggravate affected area, Daily home exercises/warm soaks, Apply ice 24-48 hours then apply heat/warm soaks, PT to be scheduled once authorized   Restrictions: No lifting/pushing/pulling more than 10 lbs, No above the shoulder/overhead work, Limited use of left hand and arm  Follow up in about 1 week (around 12/17/2020).

## 2020-12-10 NOTE — LETTER
[facility]  [address] [facility], [address]  [place] [address]  Phone: [phone]  Fax: [phone]  [facility]: [email or web address]    Pt Name: Mercedes Perez  Injury Date: 12/01/2020   Employee ID: [id number] Date of First Treatment: 12/10/2020   Company: [employer]      Appointment Time:  Arrived: 10:06 AM   Provider: Kuldeep Valentine MD Time Out: 12:30 PM     Office Treatment:   1. Acute pain of left shoulder    2. Radicular pain in left arm    3. Paresthesias in left hand      Medications Ordered This Encounter   Medications    meloxicam (MOBIC) 15 MG tablet      Patient Instructions: Attention not to aggravate affected area, Daily home exercises/warm soaks, Apply ice 24-48 hours then apply heat/warm soaks, PT to be scheduled once authorized      Restrictions: No lifting/pushing/pulling more than 10 lbs, No above the shoulder/overhead work, Limited use of left hand and arm     Return Appointment: 12/17/2020 at 9:30 AM       SH

## 2020-12-17 ENCOUNTER — OFFICE VISIT (OUTPATIENT)
Dept: URGENT CARE | Facility: CLINIC | Age: 60
End: 2020-12-17
Payer: COMMERCIAL

## 2020-12-17 DIAGNOSIS — M79.2 RADICULAR PAIN IN LEFT ARM: ICD-10-CM

## 2020-12-17 DIAGNOSIS — R20.2 PARESTHESIAS IN LEFT HAND: ICD-10-CM

## 2020-12-17 DIAGNOSIS — M25.512 ACUTE PAIN OF LEFT SHOULDER: Primary | ICD-10-CM
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
## 2020-12-17 PROCEDURE — 96372 PR INJECTION,THERAP/PROPH/DIAG2ST, IM OR SUBCUT: ICD-10-PCS | Mod: S$GLB,,, | Performed by: PHYSICIAN ASSISTANT

## 2020-12-17 PROCEDURE — 99214 OFFICE O/P EST MOD 30 MIN: CPT | Mod: 25,S$GLB,, | Performed by: PHYSICIAN ASSISTANT

## 2020-12-17 PROCEDURE — 96372 THER/PROPH/DIAG INJ SC/IM: CPT | Mod: S$GLB,,, | Performed by: PHYSICIAN ASSISTANT

## 2020-12-17 PROCEDURE — 99214 PR OFFICE/OUTPT VISIT, EST, LEVL IV, 30-39 MIN: ICD-10-PCS | Mod: 25,S$GLB,, | Performed by: PHYSICIAN ASSISTANT

## 2020-12-17 RX ORDER — BETAMETHASONE SODIUM PHOSPHATE AND BETAMETHASONE ACETATE 3; 3 MG/ML; MG/ML
9 INJECTION, SUSPENSION INTRA-ARTICULAR; INTRALESIONAL; INTRAMUSCULAR; SOFT TISSUE
Status: COMPLETED | OUTPATIENT
Start: 2020-12-17 | End: 2020-12-17

## 2020-12-17 RX ADMIN — BETAMETHASONE SODIUM PHOSPHATE AND BETAMETHASONE ACETATE 9 MG: 3; 3 INJECTION, SUSPENSION INTRA-ARTICULAR; INTRALESIONAL; INTRAMUSCULAR; SOFT TISSUE at 10:12

## 2020-12-17 NOTE — PROGRESS NOTES
Subjective:       Patient ID: Mercedes Perez is a 60 y.o. female.    Chief Complaint: Shoulder Pain (LT)     Follow-up of LT Shoulder Pain ( DOI 12- ) Pain score today is 3-4/10 with complaints of Constant Aching pain of LT Shoulder that radiates down arm to wrist, Pain increases at night and in the morning severe pain until her LT Shoulder warms up and relaxes, Numbness/Tingling of LT Index and Middle Fingers. Taking Mobic 15mg and doing Daily home exercises. Employee is RT Handed. SH    Shoulder Pain   The pain is present in the left fingers, left arm and left shoulder. The current episode started 1 to 4 weeks ago. There has been no history of extremity trauma. The problem occurs constantly. The problem has been unchanged. The quality of the pain is described as aching. The pain is at a severity of 4/10. The pain is moderate. Associated symptoms include numbness and tingling. Pertinent negatives include no fever, headaches, joint locking, joint swelling, limited range of motion or stiffness. The symptoms are aggravated by activity and lying down. She has tried NSAIDS for the symptoms.       Constitution: Negative for chills, fatigue and fever.   HENT: Negative for congestion and sore throat.    Neck: Negative for painful lymph nodes.   Cardiovascular: Negative for chest pain and leg swelling.   Eyes: Negative for double vision and blurred vision.   Respiratory: Negative for cough and shortness of breath.    Gastrointestinal: Negative for nausea, vomiting and diarrhea.   Genitourinary: Negative for dysuria, frequency, urgency and history of kidney stones.   Musculoskeletal: Positive for joint pain and muscle ache. Negative for joint swelling, abnormal ROM of joint and muscle cramps.   Skin: Negative for color change, pale, rash and bruising.   Allergic/Immunologic: Negative for seasonal allergies.   Neurological: Positive for numbness and tingling. Negative for dizziness, history of vertigo,  light-headedness, passing out and headaches.   Hematologic/Lymphatic: Negative for swollen lymph nodes.   Psychiatric/Behavioral: Negative for nervous/anxious, sleep disturbance and depression. The patient is not nervous/anxious.         Objective:      Physical Exam  Nursing note reviewed.   Constitutional:       General: She is not in acute distress.     Appearance: She is well-developed. She is not diaphoretic.   HENT:      Head: Normocephalic and atraumatic.      Right Ear: Hearing and external ear normal.      Left Ear: Hearing and external ear normal.      Nose: Nose normal. No nasal deformity.   Eyes:      General: Lids are normal. No scleral icterus.     Conjunctiva/sclera: Conjunctivae normal.   Neck:      Musculoskeletal: Normal range of motion.      Trachea: Trachea normal.   Cardiovascular:      Pulses: Normal pulses.   Pulmonary:      Effort: Pulmonary effort is normal. No respiratory distress.      Breath sounds: No stridor.   Musculoskeletal:      Left shoulder: She exhibits pain (with abduction, flexion, IR and ER). She exhibits normal range of motion, no tenderness, no bony tenderness, no swelling, no deformity, no spasm, normal pulse and normal strength.      Left elbow: She exhibits normal range of motion, no swelling and no effusion. No tenderness found.      Left wrist: She exhibits normal range of motion, no tenderness, no swelling and no deformity.      Cervical back: Normal. She exhibits normal range of motion, no tenderness, no swelling, no edema, no deformity and no pain.      Left upper arm: She exhibits no tenderness, no swelling, no edema and no deformity.      Comments: Left shoulder: O'Kuldeep, Speed's, Kenzie negative.   LUE NVI  Left wrist Tinnel's negative.    Skin:     General: Skin is warm and dry.      Capillary Refill: Capillary refill takes less than 2 seconds.   Neurological:      Mental Status: She is alert. She is not disoriented.      GCS: GCS eye subscore is 4. GCS verbal  subscore is 5. GCS motor subscore is 6.      Sensory: No sensory deficit.   Psychiatric:         Attention and Perception: She is attentive.         Speech: Speech normal.         Behavior: Behavior normal.         Assessment:       1. Acute pain of left shoulder    2. Radicular pain in left arm    3. Paresthesias in left hand    4. Work related injury        Plan:         Pt to continue Mobic, Gabapentin and flexeril as directed.     Medications Ordered This Encounter   Medications    betamethasone acetate-betamethasone sodium phosphate injection 9 mg     Patient Instructions: Daily home exercises/warm soaks, PT to be scheduled once authorized   Restrictions: Limited use of left hand and arm, No lifting/pushing/pulling more than 10 lbs, No above the shoulder/overhead work  Follow up in about 2 weeks (around 12/31/2020).

## 2020-12-17 NOTE — LETTER
[facility]  [address]  [place] [address]  Phone: [phone]  Fax: [phone]  [facility]: [email or web address]    Pt Name: Mercedes Perez  Injury Date: 12/01/2020   Employee ID: [id number] Date of Treatment: 12/17/2020   Company: [employer]      Appointment Time: 09:30 AM Arrived: 9:26 AM   Provider: Ankit Mcgraw PA-C Time Out: 10:25 AM     Office Treatment:   1. Acute pain of left shoulder    2. Radicular pain in left arm    3. Paresthesias in left hand    4. Work related injury      Medications Ordered This Encounter   Medications    betamethasone acetate-betamethasone sodium phosphate injection 9 mg      Patient Instructions: Daily home exercises/warm soaks, PT to be scheduled once authorized      Restrictions: Limited use of left hand and arm, No lifting/pushing/pulling more than 10 lbs, No above the shoulder/overhead work     Return Appointment: 12/31/2020 at  9:30 AM       JUAN C

## 2020-12-31 ENCOUNTER — OFFICE VISIT (OUTPATIENT)
Dept: URGENT CARE | Facility: CLINIC | Age: 60
End: 2020-12-31
Payer: COMMERCIAL

## 2020-12-31 DIAGNOSIS — M25.512 ACUTE PAIN OF LEFT SHOULDER: Primary | ICD-10-CM
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
## 2020-12-31 DIAGNOSIS — R20.2 PARESTHESIAS IN LEFT HAND: ICD-10-CM

## 2020-12-31 DIAGNOSIS — M79.2 RADICULAR PAIN IN LEFT ARM: ICD-10-CM

## 2020-12-31 PROCEDURE — 99214 OFFICE O/P EST MOD 30 MIN: CPT | Mod: S$GLB,,, | Performed by: NURSE PRACTITIONER

## 2020-12-31 PROCEDURE — 99214 PR OFFICE/OUTPT VISIT, EST, LEVL IV, 30-39 MIN: ICD-10-PCS | Mod: S$GLB,,, | Performed by: NURSE PRACTITIONER

## 2020-12-31 NOTE — LETTER
[facility] [place] [facility]  [address], [address]  [place] [address]  Phone: [phone]  Fax: [phone]  [facility]: [email or web address]    Pt Name: Mercedes Perez  Injury Date: 12/01/2020   Employee ID: [id number] Date of Treatment: 12/31/2020   Company: [employer]      Appointment Time: 09:15 AM Arrived: 9:25 a.m.   Provider: Deanna Jaimes NP Time Out: 10:05 a.m.     Office Treatment:   1. Acute pain of left shoulder    2. Radicular pain in left arm    3. Paresthesias in left hand    4. Work related injury          Patient Instructions: Attention not to aggravate affected area, Daily home exercises/warm soaks, Continue Physical Therapy    Restrictions: Limited use of left hand and arm, No lifting/pushing/pulling more than 10 lbs, No above the shoulder/overhead work     Return Appointment: 1/14/2021 at 9:00 a.m.  [place]

## 2020-12-31 NOTE — PROGRESS NOTES
Subjective:       Patient ID: Mercedes Perez is a 60 y.o. female.    Chief Complaint: Shoulder Pain (Left)    RV, Left shoulder/ Left middle/index finger (DOI 12/1/2020) [employer]- Patient states her left shoulder feels a lot better and only has minor pain. Pain level 1/10 on today. However her left middle and index finger are still number, no pain. Not taking Mobic anymore. [place]    She has had 3 PT sessions so far and thinks it's helping. No longer taking any medication, says she doesn't need it. Shoulder feeling a lot better. Still having continuous numbness ( which is her biggest concern) to index finger and distal aspect of middle finger L hand. MWT    Shoulder Pain   The pain is present in the left shoulder. This is a recurrent problem. The current episode started 1 to 4 weeks ago. There has been no history of extremity trauma. The problem occurs rarely. The problem has been rapidly improving. The pain is at a severity of 1/10. The pain is mild. Associated symptoms include numbness. Pertinent negatives include no fever, headaches, inability to bear weight, itching, joint locking, joint swelling, limited range of motion, stiffness, tingling or visual symptoms. She has tried nothing for the symptoms. Family history does not include arthritis. There is no history of diabetes, Injuries to Extremity or migraines.       Constitution: Negative for fatigue and fever.   HENT: Negative for facial swelling and facial trauma.    Neck: Positive for neck pain. Negative for neck stiffness.   Cardiovascular: Negative for chest trauma.   Eyes: Negative for eye trauma, double vision and blurred vision.   Respiratory: Negative.    Gastrointestinal: Negative for abdominal trauma, abdominal pain, nausea, vomiting and rectal bleeding.   Endocrine: negative.   Genitourinary: Negative for hematuria, missed menses, genital trauma and pelvic pain.   Musculoskeletal: Positive for pain. Negative for trauma, joint swelling and abnormal ROM of  joint.   Skin: Negative for color change, wound, abrasion, laceration and bruising.   Allergic/Immunologic: Negative.    Neurological: Positive for numbness and tingling. Negative for dizziness, history of vertigo, light-headedness, coordination disturbances, headaches, altered mental status and loss of consciousness.   Hematologic/Lymphatic: Negative for history of bleeding disorder.   Psychiatric/Behavioral: Negative for altered mental status.        Objective:      Physical Exam  Constitutional:       Appearance: Normal appearance.   HENT:      Right Ear: External ear normal.      Left Ear: External ear normal.   Eyes:      Conjunctiva/sclera: Conjunctivae normal.   Cardiovascular:      Pulses: Normal pulses.   Pulmonary:      Effort: Pulmonary effort is normal.   Musculoskeletal:         General: Tenderness present.      Left shoulder: She exhibits tenderness, crepitus and pain. She exhibits normal range of motion, no swelling, normal pulse and normal strength.      Cervical back: She exhibits normal range of motion and normal pulse.        Hands:       Comments: Good ROM to shoulder with minimal pain. Neg empty can test. Good strength, strong .  Has continuous numbness to L index and distal middle fingers. No worsening with Phalan's or Tinel's.  Mild pain to posterior and L side of neck with extension and L lateral rotation.   Skin:     General: Skin is warm and dry.      Capillary Refill: Capillary refill takes less than 2 seconds.   Neurological:      General: No focal deficit present.      Mental Status: She is alert and oriented to person, place, and time.   Psychiatric:         Mood and Affect: Mood normal.         Behavior: Behavior normal.         Thought Content: Thought content normal.         Judgment: Judgment normal.         Assessment:       1. Acute pain of left shoulder    2. Radicular pain in left arm    3. Paresthesias in left hand    4. Work related injury        Plan:     Pt reports she  has been working at [employer] for 30 years and at this particular job for 22 years. She is afraid if she returns to regular duty the pain will worsen. She plans to talk to her supervisor about possibly working in a different area in the future.    She will continue PT and will take medication prn.       Patient Instructions: Attention not to aggravate affected area, Daily home exercises/warm soaks, Continue Physical Therapy   Restrictions: Limited use of left hand and arm, No lifting/pushing/pulling more than 10 lbs, No above the shoulder/overhead work  Follow up in about 2 weeks (around 1/14/2021).

## 2021-01-05 ENCOUNTER — PATIENT MESSAGE (OUTPATIENT)
Dept: ADMINISTRATIVE | Facility: HOSPITAL | Age: 61
End: 2021-01-05

## 2021-01-07 DIAGNOSIS — Z12.31 OTHER SCREENING MAMMOGRAM: ICD-10-CM

## 2021-01-14 ENCOUNTER — OFFICE VISIT (OUTPATIENT)
Dept: URGENT CARE | Facility: CLINIC | Age: 61
End: 2021-01-14
Payer: COMMERCIAL

## 2021-01-14 DIAGNOSIS — M79.2 RADICULAR PAIN IN LEFT ARM: ICD-10-CM

## 2021-01-14 DIAGNOSIS — R20.0 FINGER NUMBNESS: Primary | ICD-10-CM
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
## 2021-01-14 DIAGNOSIS — R20.2 PARESTHESIAS IN LEFT HAND: ICD-10-CM

## 2021-01-14 DIAGNOSIS — M25.512 ACUTE PAIN OF LEFT SHOULDER: ICD-10-CM

## 2021-01-14 PROCEDURE — 99214 PR OFFICE/OUTPT VISIT, EST, LEVL IV, 30-39 MIN: ICD-10-PCS | Mod: S$GLB,,, | Performed by: EMERGENCY MEDICINE

## 2021-01-14 PROCEDURE — 99214 OFFICE O/P EST MOD 30 MIN: CPT | Mod: S$GLB,,, | Performed by: EMERGENCY MEDICINE

## 2021-01-28 ENCOUNTER — OFFICE VISIT (OUTPATIENT)
Dept: URGENT CARE | Facility: CLINIC | Age: 61
End: 2021-01-28
Payer: COMMERCIAL

## 2021-01-28 DIAGNOSIS — M25.512 ACUTE PAIN OF LEFT SHOULDER: Primary | ICD-10-CM

## 2021-01-28 DIAGNOSIS — R20.2 PARESTHESIAS IN LEFT HAND: ICD-10-CM

## 2021-01-28 DIAGNOSIS — M79.2 RADICULAR PAIN IN LEFT ARM: ICD-10-CM

## 2021-01-28 DIAGNOSIS — R20.0 FINGER NUMBNESS: ICD-10-CM

## 2021-01-28 PROCEDURE — 99214 OFFICE O/P EST MOD 30 MIN: CPT | Mod: S$GLB,,, | Performed by: PREVENTIVE MEDICINE

## 2021-01-28 PROCEDURE — 99214 PR OFFICE/OUTPT VISIT, EST, LEVL IV, 30-39 MIN: ICD-10-PCS | Mod: S$GLB,,, | Performed by: PREVENTIVE MEDICINE

## 2021-03-24 ENCOUNTER — IMMUNIZATION (OUTPATIENT)
Dept: PRIMARY CARE CLINIC | Facility: CLINIC | Age: 61
End: 2021-03-24
Payer: COMMERCIAL

## 2021-03-24 DIAGNOSIS — Z23 NEED FOR VACCINATION: Primary | ICD-10-CM

## 2021-03-24 PROCEDURE — 91300 COVID-19, MRNA, LNP-S, PF, 30 MCG/0.3 ML DOSE VACCINE: ICD-10-PCS | Mod: S$GLB,,, | Performed by: INTERNAL MEDICINE

## 2021-03-24 PROCEDURE — 0001A COVID-19, MRNA, LNP-S, PF, 30 MCG/0.3 ML DOSE VACCINE: CPT | Mod: CV19,S$GLB,, | Performed by: INTERNAL MEDICINE

## 2021-03-24 PROCEDURE — 0001A COVID-19, MRNA, LNP-S, PF, 30 MCG/0.3 ML DOSE VACCINE: ICD-10-PCS | Mod: CV19,S$GLB,, | Performed by: INTERNAL MEDICINE

## 2021-03-24 PROCEDURE — 91300 COVID-19, MRNA, LNP-S, PF, 30 MCG/0.3 ML DOSE VACCINE: CPT | Mod: S$GLB,,, | Performed by: INTERNAL MEDICINE

## 2021-04-05 ENCOUNTER — PATIENT MESSAGE (OUTPATIENT)
Dept: ADMINISTRATIVE | Facility: HOSPITAL | Age: 61
End: 2021-04-05

## 2021-04-14 ENCOUNTER — IMMUNIZATION (OUTPATIENT)
Dept: PRIMARY CARE CLINIC | Facility: CLINIC | Age: 61
End: 2021-04-14
Payer: COMMERCIAL

## 2021-04-14 DIAGNOSIS — Z23 NEED FOR VACCINATION: Primary | ICD-10-CM

## 2021-04-14 PROCEDURE — 91300 COVID-19, MRNA, LNP-S, PF, 30 MCG/0.3 ML DOSE VACCINE: ICD-10-PCS | Mod: S$GLB,,, | Performed by: INTERNAL MEDICINE

## 2021-04-14 PROCEDURE — 0002A COVID-19, MRNA, LNP-S, PF, 30 MCG/0.3 ML DOSE VACCINE: ICD-10-PCS | Mod: CV19,S$GLB,, | Performed by: INTERNAL MEDICINE

## 2021-04-14 PROCEDURE — 0002A COVID-19, MRNA, LNP-S, PF, 30 MCG/0.3 ML DOSE VACCINE: CPT | Mod: CV19,S$GLB,, | Performed by: INTERNAL MEDICINE

## 2021-04-14 PROCEDURE — 91300 COVID-19, MRNA, LNP-S, PF, 30 MCG/0.3 ML DOSE VACCINE: CPT | Mod: S$GLB,,, | Performed by: INTERNAL MEDICINE

## 2021-04-20 ENCOUNTER — PATIENT OUTREACH (OUTPATIENT)
Dept: ADMINISTRATIVE | Facility: OTHER | Age: 61
End: 2021-04-20

## 2021-04-26 ENCOUNTER — OFFICE VISIT (OUTPATIENT)
Dept: OBSTETRICS AND GYNECOLOGY | Facility: CLINIC | Age: 61
End: 2021-04-26
Payer: COMMERCIAL

## 2021-04-26 VITALS — BODY MASS INDEX: 20.6 KG/M2 | DIASTOLIC BLOOD PRESSURE: 86 MMHG | WEIGHT: 98.56 LBS | SYSTOLIC BLOOD PRESSURE: 140 MMHG

## 2021-04-26 DIAGNOSIS — Z12.31 BREAST CANCER SCREENING BY MAMMOGRAM: ICD-10-CM

## 2021-04-26 DIAGNOSIS — Z01.419 WELL WOMAN EXAM WITH ROUTINE GYNECOLOGICAL EXAM: Primary | ICD-10-CM

## 2021-04-26 DIAGNOSIS — N84.1 CERVICAL POLYP: ICD-10-CM

## 2021-04-26 DIAGNOSIS — Z12.4 ENCOUNTER FOR PAPANICOLAOU SMEAR FOR CERVICAL CANCER SCREENING: ICD-10-CM

## 2021-04-26 PROCEDURE — 88305 TISSUE EXAM BY PATHOLOGIST: CPT | Performed by: PATHOLOGY

## 2021-04-26 PROCEDURE — 87624 HPV HI-RISK TYP POOLED RSLT: CPT | Performed by: OBSTETRICS & GYNECOLOGY

## 2021-04-26 PROCEDURE — 57500 PR BIOPSY CERVIX, 1 OR MORE, OR EXCISION OF LESION: ICD-10-PCS | Mod: S$GLB,,, | Performed by: OBSTETRICS & GYNECOLOGY

## 2021-04-26 PROCEDURE — 99999 PR PBB SHADOW E&M-EST. PATIENT-LVL III: CPT | Mod: PBBFAC,,, | Performed by: OBSTETRICS & GYNECOLOGY

## 2021-04-26 PROCEDURE — 3008F BODY MASS INDEX DOCD: CPT | Mod: CPTII,S$GLB,, | Performed by: OBSTETRICS & GYNECOLOGY

## 2021-04-26 PROCEDURE — 1126F PR PAIN SEVERITY QUANTIFIED, NO PAIN PRESENT: ICD-10-PCS | Mod: S$GLB,,, | Performed by: OBSTETRICS & GYNECOLOGY

## 2021-04-26 PROCEDURE — 3008F PR BODY MASS INDEX (BMI) DOCUMENTED: ICD-10-PCS | Mod: CPTII,S$GLB,, | Performed by: OBSTETRICS & GYNECOLOGY

## 2021-04-26 PROCEDURE — 1126F AMNT PAIN NOTED NONE PRSNT: CPT | Mod: S$GLB,,, | Performed by: OBSTETRICS & GYNECOLOGY

## 2021-04-26 PROCEDURE — 99396 PREV VISIT EST AGE 40-64: CPT | Mod: 25,S$GLB,, | Performed by: OBSTETRICS & GYNECOLOGY

## 2021-04-26 PROCEDURE — 99999 PR PBB SHADOW E&M-EST. PATIENT-LVL III: ICD-10-PCS | Mod: PBBFAC,,, | Performed by: OBSTETRICS & GYNECOLOGY

## 2021-04-26 PROCEDURE — 99396 PR PREVENTIVE VISIT,EST,40-64: ICD-10-PCS | Mod: 25,S$GLB,, | Performed by: OBSTETRICS & GYNECOLOGY

## 2021-04-26 PROCEDURE — 88142 CYTOPATH C/V THIN LAYER: CPT | Performed by: OBSTETRICS & GYNECOLOGY

## 2021-04-26 PROCEDURE — 88305 TISSUE EXAM BY PATHOLOGIST: CPT | Mod: 26,,, | Performed by: PATHOLOGY

## 2021-04-26 PROCEDURE — 57500 BIOPSY OF CERVIX: CPT | Mod: S$GLB,,, | Performed by: OBSTETRICS & GYNECOLOGY

## 2021-04-26 PROCEDURE — 88305 TISSUE EXAM BY PATHOLOGIST: ICD-10-PCS | Mod: 26,,, | Performed by: PATHOLOGY

## 2021-04-29 LAB
FINAL PATHOLOGIC DIAGNOSIS: NORMAL
GROSS: NORMAL
Lab: NORMAL

## 2021-04-30 LAB
FINAL PATHOLOGIC DIAGNOSIS: NORMAL
Lab: NORMAL

## 2021-05-03 LAB
HPV HR 12 DNA SPEC QL NAA+PROBE: NEGATIVE
HPV16 AG SPEC QL: NEGATIVE
HPV18 DNA SPEC QL NAA+PROBE: NEGATIVE

## 2021-05-04 ENCOUNTER — HOSPITAL ENCOUNTER (OUTPATIENT)
Dept: RADIOLOGY | Facility: HOSPITAL | Age: 61
Discharge: HOME | End: 2021-05-04
Attending: FAMILY MEDICINE
Payer: COMMERCIAL

## 2021-05-04 ENCOUNTER — OFFICE VISIT (OUTPATIENT)
Dept: OBSTETRICS AND GYNECOLOGY | Facility: CLINIC | Age: 61
End: 2021-05-04
Payer: COMMERCIAL

## 2021-05-04 VITALS — DIASTOLIC BLOOD PRESSURE: 76 MMHG | BODY MASS INDEX: 20.32 KG/M2 | SYSTOLIC BLOOD PRESSURE: 116 MMHG | WEIGHT: 97.25 LBS

## 2021-05-04 DIAGNOSIS — Z12.31 OTHER SCREENING MAMMOGRAM: ICD-10-CM

## 2021-05-04 DIAGNOSIS — Z46.89 ENCOUNTER FOR FITTING AND ADJUSTMENT OF PESSARY: Primary | ICD-10-CM

## 2021-05-04 PROCEDURE — 99499 NO LOS: ICD-10-PCS | Mod: S$GLB,,, | Performed by: OBSTETRICS & GYNECOLOGY

## 2021-05-04 PROCEDURE — 77067 SCR MAMMO BI INCL CAD: CPT | Mod: 26,,, | Performed by: RADIOLOGY

## 2021-05-04 PROCEDURE — 57160 INSERT PESSARY/OTHER DEVICE: CPT | Mod: S$GLB,,, | Performed by: OBSTETRICS & GYNECOLOGY

## 2021-05-04 PROCEDURE — 77063 MAMMO DIGITAL SCREENING BILAT WITH TOMO: ICD-10-PCS | Mod: 26,,, | Performed by: RADIOLOGY

## 2021-05-04 PROCEDURE — 77067 SCR MAMMO BI INCL CAD: CPT | Mod: TC

## 2021-05-04 PROCEDURE — 3008F BODY MASS INDEX DOCD: CPT | Mod: CPTII,S$GLB,, | Performed by: OBSTETRICS & GYNECOLOGY

## 2021-05-04 PROCEDURE — 1126F PR PAIN SEVERITY QUANTIFIED, NO PAIN PRESENT: ICD-10-PCS | Mod: S$GLB,,, | Performed by: OBSTETRICS & GYNECOLOGY

## 2021-05-04 PROCEDURE — 57160 PR FIT/INSERT INTRAVAG SUPPORT DEVICE: ICD-10-PCS | Mod: S$GLB,,, | Performed by: OBSTETRICS & GYNECOLOGY

## 2021-05-04 PROCEDURE — 3008F PR BODY MASS INDEX (BMI) DOCUMENTED: ICD-10-PCS | Mod: CPTII,S$GLB,, | Performed by: OBSTETRICS & GYNECOLOGY

## 2021-05-04 PROCEDURE — 77067 MAMMO DIGITAL SCREENING BILAT WITH TOMO: ICD-10-PCS | Mod: 26,,, | Performed by: RADIOLOGY

## 2021-05-04 PROCEDURE — 99999 PR PBB SHADOW E&M-EST. PATIENT-LVL III: CPT | Mod: PBBFAC,,, | Performed by: OBSTETRICS & GYNECOLOGY

## 2021-05-04 PROCEDURE — 77063 BREAST TOMOSYNTHESIS BI: CPT | Mod: 26,,, | Performed by: RADIOLOGY

## 2021-05-04 PROCEDURE — 1126F AMNT PAIN NOTED NONE PRSNT: CPT | Mod: S$GLB,,, | Performed by: OBSTETRICS & GYNECOLOGY

## 2021-05-04 PROCEDURE — 99999 PR PBB SHADOW E&M-EST. PATIENT-LVL III: ICD-10-PCS | Mod: PBBFAC,,, | Performed by: OBSTETRICS & GYNECOLOGY

## 2021-05-04 PROCEDURE — 99499 UNLISTED E&M SERVICE: CPT | Mod: S$GLB,,, | Performed by: OBSTETRICS & GYNECOLOGY

## 2021-06-21 ENCOUNTER — PATIENT OUTREACH (OUTPATIENT)
Dept: ADMINISTRATIVE | Facility: OTHER | Age: 61
End: 2021-06-21

## 2021-06-29 ENCOUNTER — CLINICAL SUPPORT (OUTPATIENT)
Dept: OTHER | Facility: CLINIC | Age: 61
End: 2021-06-29
Payer: COMMERCIAL

## 2021-06-29 DIAGNOSIS — Z00.8 ENCOUNTER FOR OTHER GENERAL EXAMINATION: ICD-10-CM

## 2021-07-06 VITALS — BODY MASS INDEX: 20.32 KG/M2 | HEIGHT: 58 IN

## 2021-07-06 LAB
HDLC SERPL-MCNC: 74 MG/DL
POC CHOLESTEROL, LDL (DOCK): 94 MG/DL
POC CHOLESTEROL, TOTAL: 196 MG/DL
POC GLUCOSE, FASTING: 96 MG/DL (ref 60–110)
TRIGL SERPL-MCNC: 133 MG/DL

## 2021-08-09 ENCOUNTER — PATIENT MESSAGE (OUTPATIENT)
Dept: ADMINISTRATIVE | Facility: HOSPITAL | Age: 61
End: 2021-08-09

## 2021-08-09 ENCOUNTER — PATIENT OUTREACH (OUTPATIENT)
Dept: ADMINISTRATIVE | Facility: HOSPITAL | Age: 61
End: 2021-08-09

## 2021-08-09 DIAGNOSIS — Z12.11 COLON CANCER SCREENING: Primary | ICD-10-CM

## 2021-08-13 ENCOUNTER — PATIENT OUTREACH (OUTPATIENT)
Dept: ADMINISTRATIVE | Facility: OTHER | Age: 61
End: 2021-08-13

## 2021-08-17 ENCOUNTER — OFFICE VISIT (OUTPATIENT)
Dept: OBSTETRICS AND GYNECOLOGY | Facility: CLINIC | Age: 61
End: 2021-08-17
Payer: COMMERCIAL

## 2021-08-17 VITALS
BODY MASS INDEX: 20.69 KG/M2 | WEIGHT: 98.56 LBS | SYSTOLIC BLOOD PRESSURE: 104 MMHG | DIASTOLIC BLOOD PRESSURE: 60 MMHG | HEIGHT: 58 IN

## 2021-08-17 DIAGNOSIS — Z46.89 ENCOUNTER FOR FITTING AND ADJUSTMENT OF PESSARY: Primary | ICD-10-CM

## 2021-08-17 DIAGNOSIS — Z96.0 PRESENCE OF PESSARY: ICD-10-CM

## 2021-08-17 PROCEDURE — 1159F MED LIST DOCD IN RCRD: CPT | Mod: CPTII,S$GLB,, | Performed by: OBSTETRICS & GYNECOLOGY

## 2021-08-17 PROCEDURE — 99999 PR PBB SHADOW E&M-EST. PATIENT-LVL III: ICD-10-PCS | Mod: PBBFAC,,, | Performed by: OBSTETRICS & GYNECOLOGY

## 2021-08-17 PROCEDURE — 3074F SYST BP LT 130 MM HG: CPT | Mod: CPTII,S$GLB,, | Performed by: OBSTETRICS & GYNECOLOGY

## 2021-08-17 PROCEDURE — 99499 UNLISTED E&M SERVICE: CPT | Mod: S$GLB,,, | Performed by: OBSTETRICS & GYNECOLOGY

## 2021-08-17 PROCEDURE — 99499 NO LOS: ICD-10-PCS | Mod: S$GLB,,, | Performed by: OBSTETRICS & GYNECOLOGY

## 2021-08-17 PROCEDURE — 1159F PR MEDICATION LIST DOCUMENTED IN MEDICAL RECORD: ICD-10-PCS | Mod: CPTII,S$GLB,, | Performed by: OBSTETRICS & GYNECOLOGY

## 2021-08-17 PROCEDURE — 3078F PR MOST RECENT DIASTOLIC BLOOD PRESSURE < 80 MM HG: ICD-10-PCS | Mod: CPTII,S$GLB,, | Performed by: OBSTETRICS & GYNECOLOGY

## 2021-08-17 PROCEDURE — 3078F DIAST BP <80 MM HG: CPT | Mod: CPTII,S$GLB,, | Performed by: OBSTETRICS & GYNECOLOGY

## 2021-08-17 PROCEDURE — 57160 INSERT PESSARY/OTHER DEVICE: CPT | Mod: S$GLB,,, | Performed by: OBSTETRICS & GYNECOLOGY

## 2021-08-17 PROCEDURE — 3074F PR MOST RECENT SYSTOLIC BLOOD PRESSURE < 130 MM HG: ICD-10-PCS | Mod: CPTII,S$GLB,, | Performed by: OBSTETRICS & GYNECOLOGY

## 2021-08-17 PROCEDURE — 99999 PR PBB SHADOW E&M-EST. PATIENT-LVL III: CPT | Mod: PBBFAC,,, | Performed by: OBSTETRICS & GYNECOLOGY

## 2021-08-17 PROCEDURE — 3008F BODY MASS INDEX DOCD: CPT | Mod: CPTII,S$GLB,, | Performed by: OBSTETRICS & GYNECOLOGY

## 2021-08-17 PROCEDURE — 57160 PR FIT/INSERT INTRAVAG SUPPORT DEVICE: ICD-10-PCS | Mod: S$GLB,,, | Performed by: OBSTETRICS & GYNECOLOGY

## 2021-08-17 PROCEDURE — 3008F PR BODY MASS INDEX (BMI) DOCUMENTED: ICD-10-PCS | Mod: CPTII,S$GLB,, | Performed by: OBSTETRICS & GYNECOLOGY

## 2021-08-18 PROBLEM — Z96.0 PRESENCE OF PESSARY: Status: ACTIVE | Noted: 2021-08-18

## 2021-08-23 ENCOUNTER — LAB VISIT (OUTPATIENT)
Dept: LAB | Facility: HOSPITAL | Age: 61
End: 2021-08-23
Attending: FAMILY MEDICINE
Payer: COMMERCIAL

## 2021-08-23 ENCOUNTER — TELEPHONE (OUTPATIENT)
Dept: INTERNAL MEDICINE | Facility: CLINIC | Age: 61
End: 2021-08-23

## 2021-08-23 ENCOUNTER — OFFICE VISIT (OUTPATIENT)
Dept: INTERNAL MEDICINE | Facility: CLINIC | Age: 61
End: 2021-08-23
Payer: COMMERCIAL

## 2021-08-23 VITALS
OXYGEN SATURATION: 98 % | SYSTOLIC BLOOD PRESSURE: 138 MMHG | HEIGHT: 58 IN | HEART RATE: 67 BPM | DIASTOLIC BLOOD PRESSURE: 72 MMHG | WEIGHT: 96.81 LBS | BODY MASS INDEX: 20.32 KG/M2

## 2021-08-23 DIAGNOSIS — Z86.16 HISTORY OF COVID-19: ICD-10-CM

## 2021-08-23 DIAGNOSIS — Z12.11 COLON CANCER SCREENING: Primary | ICD-10-CM

## 2021-08-23 DIAGNOSIS — Z01.818 PRE-OP TESTING: ICD-10-CM

## 2021-08-23 DIAGNOSIS — M81.0 AGE-RELATED OSTEOPOROSIS WITHOUT CURRENT PATHOLOGICAL FRACTURE: ICD-10-CM

## 2021-08-23 DIAGNOSIS — Z11.4 SCREENING FOR HIV (HUMAN IMMUNODEFICIENCY VIRUS): ICD-10-CM

## 2021-08-23 DIAGNOSIS — Z12.11 SCREEN FOR COLON CANCER: ICD-10-CM

## 2021-08-23 DIAGNOSIS — M81.0 OSTEOPOROSIS, UNSPECIFIED OSTEOPOROSIS TYPE, UNSPECIFIED PATHOLOGICAL FRACTURE PRESENCE: ICD-10-CM

## 2021-08-23 DIAGNOSIS — Z00.00 ANNUAL PHYSICAL EXAM: ICD-10-CM

## 2021-08-23 DIAGNOSIS — Z00.00 ANNUAL PHYSICAL EXAM: Primary | ICD-10-CM

## 2021-08-23 DIAGNOSIS — Z96.0 PRESENCE OF PESSARY: ICD-10-CM

## 2021-08-23 LAB
25(OH)D3+25(OH)D2 SERPL-MCNC: 40 NG/ML (ref 30–96)
ALBUMIN SERPL BCP-MCNC: 4.1 G/DL (ref 3.5–5.2)
ALP SERPL-CCNC: 67 U/L (ref 55–135)
ALT SERPL W/O P-5'-P-CCNC: 21 U/L (ref 10–44)
ANION GAP SERPL CALC-SCNC: 13 MMOL/L (ref 8–16)
AST SERPL-CCNC: 22 U/L (ref 10–40)
BASOPHILS # BLD AUTO: 0.06 K/UL (ref 0–0.2)
BASOPHILS NFR BLD: 1.2 % (ref 0–1.9)
BILIRUB SERPL-MCNC: 0.7 MG/DL (ref 0.1–1)
BUN SERPL-MCNC: 13 MG/DL (ref 8–23)
CALCIUM SERPL-MCNC: 9.7 MG/DL (ref 8.7–10.5)
CHLORIDE SERPL-SCNC: 102 MMOL/L (ref 95–110)
CO2 SERPL-SCNC: 20 MMOL/L (ref 23–29)
CREAT SERPL-MCNC: 0.7 MG/DL (ref 0.5–1.4)
DIFFERENTIAL METHOD: ABNORMAL
EOSINOPHIL # BLD AUTO: 0.1 K/UL (ref 0–0.5)
EOSINOPHIL NFR BLD: 2.3 % (ref 0–8)
ERYTHROCYTE [DISTWIDTH] IN BLOOD BY AUTOMATED COUNT: 13.2 % (ref 11.5–14.5)
EST. GFR  (AFRICAN AMERICAN): >60 ML/MIN/1.73 M^2
EST. GFR  (NON AFRICAN AMERICAN): >60 ML/MIN/1.73 M^2
GLUCOSE SERPL-MCNC: 95 MG/DL (ref 70–110)
HCT VFR BLD AUTO: 40.2 % (ref 37–48.5)
HGB BLD-MCNC: 13.2 G/DL (ref 12–16)
IMM GRANULOCYTES # BLD AUTO: 0.01 K/UL (ref 0–0.04)
IMM GRANULOCYTES NFR BLD AUTO: 0.2 % (ref 0–0.5)
LYMPHOCYTES # BLD AUTO: 2.2 K/UL (ref 1–4.8)
LYMPHOCYTES NFR BLD: 44.5 % (ref 18–48)
MCH RBC QN AUTO: 30.8 PG (ref 27–31)
MCHC RBC AUTO-ENTMCNC: 32.8 G/DL (ref 32–36)
MCV RBC AUTO: 94 FL (ref 82–98)
MONOCYTES # BLD AUTO: 0.2 K/UL (ref 0.3–1)
MONOCYTES NFR BLD: 4.8 % (ref 4–15)
NEUTROPHILS # BLD AUTO: 2.3 K/UL (ref 1.8–7.7)
NEUTROPHILS NFR BLD: 47 % (ref 38–73)
NRBC BLD-RTO: 0 /100 WBC
PLATELET # BLD AUTO: 271 K/UL (ref 150–450)
PMV BLD AUTO: 9 FL (ref 9.2–12.9)
POTASSIUM SERPL-SCNC: 3.8 MMOL/L (ref 3.5–5.1)
PROT SERPL-MCNC: 7.3 G/DL (ref 6–8.4)
RBC # BLD AUTO: 4.28 M/UL (ref 4–5.4)
SODIUM SERPL-SCNC: 135 MMOL/L (ref 136–145)
TSH SERPL DL<=0.005 MIU/L-ACNC: 2.51 UIU/ML (ref 0.4–4)
WBC # BLD AUTO: 4.83 K/UL (ref 3.9–12.7)

## 2021-08-23 PROCEDURE — 99999 PR PBB SHADOW E&M-EST. PATIENT-LVL V: ICD-10-PCS | Mod: PBBFAC,,, | Performed by: FAMILY MEDICINE

## 2021-08-23 PROCEDURE — 1160F RVW MEDS BY RX/DR IN RCRD: CPT | Mod: CPTII,S$GLB,, | Performed by: FAMILY MEDICINE

## 2021-08-23 PROCEDURE — 1126F PR PAIN SEVERITY QUANTIFIED, NO PAIN PRESENT: ICD-10-PCS | Mod: CPTII,S$GLB,, | Performed by: FAMILY MEDICINE

## 2021-08-23 PROCEDURE — 3078F PR MOST RECENT DIASTOLIC BLOOD PRESSURE < 80 MM HG: ICD-10-PCS | Mod: CPTII,S$GLB,, | Performed by: FAMILY MEDICINE

## 2021-08-23 PROCEDURE — 3078F DIAST BP <80 MM HG: CPT | Mod: CPTII,S$GLB,, | Performed by: FAMILY MEDICINE

## 2021-08-23 PROCEDURE — 36415 COLL VENOUS BLD VENIPUNCTURE: CPT | Performed by: FAMILY MEDICINE

## 2021-08-23 PROCEDURE — 99396 PR PREVENTIVE VISIT,EST,40-64: ICD-10-PCS | Mod: S$GLB,,, | Performed by: FAMILY MEDICINE

## 2021-08-23 PROCEDURE — 3075F SYST BP GE 130 - 139MM HG: CPT | Mod: CPTII,S$GLB,, | Performed by: FAMILY MEDICINE

## 2021-08-23 PROCEDURE — 1159F PR MEDICATION LIST DOCUMENTED IN MEDICAL RECORD: ICD-10-PCS | Mod: CPTII,S$GLB,, | Performed by: FAMILY MEDICINE

## 2021-08-23 PROCEDURE — 3075F PR MOST RECENT SYSTOLIC BLOOD PRESS GE 130-139MM HG: ICD-10-PCS | Mod: CPTII,S$GLB,, | Performed by: FAMILY MEDICINE

## 2021-08-23 PROCEDURE — 87389 HIV-1 AG W/HIV-1&-2 AB AG IA: CPT | Performed by: FAMILY MEDICINE

## 2021-08-23 PROCEDURE — 1126F AMNT PAIN NOTED NONE PRSNT: CPT | Mod: CPTII,S$GLB,, | Performed by: FAMILY MEDICINE

## 2021-08-23 PROCEDURE — 80053 COMPREHEN METABOLIC PANEL: CPT | Performed by: FAMILY MEDICINE

## 2021-08-23 PROCEDURE — 85025 COMPLETE CBC W/AUTO DIFF WBC: CPT | Performed by: FAMILY MEDICINE

## 2021-08-23 PROCEDURE — 99999 PR PBB SHADOW E&M-EST. PATIENT-LVL V: CPT | Mod: PBBFAC,,, | Performed by: FAMILY MEDICINE

## 2021-08-23 PROCEDURE — 1160F PR REVIEW ALL MEDS BY PRESCRIBER/CLIN PHARMACIST DOCUMENTED: ICD-10-PCS | Mod: CPTII,S$GLB,, | Performed by: FAMILY MEDICINE

## 2021-08-23 PROCEDURE — 1159F MED LIST DOCD IN RCRD: CPT | Mod: CPTII,S$GLB,, | Performed by: FAMILY MEDICINE

## 2021-08-23 PROCEDURE — 84443 ASSAY THYROID STIM HORMONE: CPT | Performed by: FAMILY MEDICINE

## 2021-08-23 PROCEDURE — 3008F PR BODY MASS INDEX (BMI) DOCUMENTED: ICD-10-PCS | Mod: CPTII,S$GLB,, | Performed by: FAMILY MEDICINE

## 2021-08-23 PROCEDURE — 82306 VITAMIN D 25 HYDROXY: CPT | Performed by: FAMILY MEDICINE

## 2021-08-23 PROCEDURE — 99396 PREV VISIT EST AGE 40-64: CPT | Mod: S$GLB,,, | Performed by: FAMILY MEDICINE

## 2021-08-23 PROCEDURE — 3008F BODY MASS INDEX DOCD: CPT | Mod: CPTII,S$GLB,, | Performed by: FAMILY MEDICINE

## 2021-08-23 PROCEDURE — 83036 HEMOGLOBIN GLYCOSYLATED A1C: CPT | Performed by: FAMILY MEDICINE

## 2021-08-23 RX ORDER — ZOLEDRONIC ACID 5 MG/100ML
5 INJECTION, SOLUTION INTRAVENOUS
Status: CANCELLED | OUTPATIENT
Start: 2021-08-23

## 2021-08-23 RX ORDER — SODIUM CHLORIDE 0.9 % (FLUSH) 0.9 %
10 SYRINGE (ML) INJECTION
Status: CANCELLED | OUTPATIENT
Start: 2021-08-23

## 2021-08-23 RX ORDER — ACETAMINOPHEN 500 MG
500 TABLET ORAL
Status: CANCELLED | OUTPATIENT
Start: 2021-08-23

## 2021-08-23 RX ORDER — SODIUM, POTASSIUM,MAG SULFATES 17.5-3.13G
1 SOLUTION, RECONSTITUTED, ORAL ORAL DAILY
Qty: 1 KIT | Refills: 0 | Status: SHIPPED | OUTPATIENT
Start: 2021-08-23 | End: 2021-08-23

## 2021-08-23 RX ORDER — HEPARIN 100 UNIT/ML
500 SYRINGE INTRAVENOUS
Status: CANCELLED | OUTPATIENT
Start: 2021-08-23

## 2021-08-24 LAB
ESTIMATED AVG GLUCOSE: 120 MG/DL (ref 68–131)
HBA1C MFR BLD: 5.8 % (ref 4–5.6)
HIV 1+2 AB+HIV1 P24 AG SERPL QL IA: NEGATIVE

## 2021-08-26 ENCOUNTER — PATIENT MESSAGE (OUTPATIENT)
Dept: INTERNAL MEDICINE | Facility: CLINIC | Age: 61
End: 2021-08-26

## 2021-09-30 ENCOUNTER — INFUSION (OUTPATIENT)
Dept: INFECTIOUS DISEASES | Facility: HOSPITAL | Age: 61
End: 2021-09-30
Attending: INTERNAL MEDICINE
Payer: COMMERCIAL

## 2021-09-30 VITALS
TEMPERATURE: 98 F | DIASTOLIC BLOOD PRESSURE: 68 MMHG | WEIGHT: 97.75 LBS | SYSTOLIC BLOOD PRESSURE: 136 MMHG | RESPIRATION RATE: 15 BRPM | BODY MASS INDEX: 20.52 KG/M2 | HEIGHT: 58 IN | OXYGEN SATURATION: 96 % | HEART RATE: 80 BPM

## 2021-09-30 DIAGNOSIS — M81.0 AGE-RELATED OSTEOPOROSIS WITHOUT CURRENT PATHOLOGICAL FRACTURE: Primary | ICD-10-CM

## 2021-09-30 DIAGNOSIS — Z78.0 POST-MENOPAUSAL: ICD-10-CM

## 2021-09-30 PROCEDURE — 63600175 PHARM REV CODE 636 W HCPCS: Performed by: FAMILY MEDICINE

## 2021-09-30 PROCEDURE — 25000003 PHARM REV CODE 250: Performed by: FAMILY MEDICINE

## 2021-09-30 PROCEDURE — 96365 THER/PROPH/DIAG IV INF INIT: CPT

## 2021-09-30 RX ORDER — ACETAMINOPHEN 500 MG
500 TABLET ORAL
Status: DISCONTINUED | OUTPATIENT
Start: 2021-09-30 | End: 2021-09-30 | Stop reason: HOSPADM

## 2021-09-30 RX ORDER — ACETAMINOPHEN 500 MG
500 TABLET ORAL
Status: CANCELLED | OUTPATIENT
Start: 2021-09-30

## 2021-09-30 RX ORDER — HEPARIN 100 UNIT/ML
500 SYRINGE INTRAVENOUS
Status: CANCELLED | OUTPATIENT
Start: 2021-09-30

## 2021-09-30 RX ORDER — ZOLEDRONIC ACID 5 MG/100ML
5 INJECTION, SOLUTION INTRAVENOUS
Status: COMPLETED | OUTPATIENT
Start: 2021-09-30 | End: 2021-09-30

## 2021-09-30 RX ORDER — SODIUM CHLORIDE 0.9 % (FLUSH) 0.9 %
10 SYRINGE (ML) INJECTION
Status: CANCELLED | OUTPATIENT
Start: 2021-09-30

## 2021-09-30 RX ORDER — SODIUM CHLORIDE 0.9 % (FLUSH) 0.9 %
10 SYRINGE (ML) INJECTION
Status: DISCONTINUED | OUTPATIENT
Start: 2021-09-30 | End: 2021-09-30 | Stop reason: HOSPADM

## 2021-09-30 RX ORDER — ZOLEDRONIC ACID 5 MG/100ML
5 INJECTION, SOLUTION INTRAVENOUS
Status: CANCELLED | OUTPATIENT
Start: 2021-09-30

## 2021-09-30 RX ADMIN — ZOLEDRONIC ACID 5 MG: 5 INJECTION, SOLUTION INTRAVENOUS at 03:09

## 2021-09-30 RX ADMIN — ACETAMINOPHEN 500 MG: 500 TABLET ORAL at 02:09

## 2021-10-29 ENCOUNTER — LAB VISIT (OUTPATIENT)
Dept: FAMILY MEDICINE | Facility: CLINIC | Age: 61
End: 2021-10-29
Payer: COMMERCIAL

## 2021-10-29 ENCOUNTER — PATIENT OUTREACH (OUTPATIENT)
Dept: ADMINISTRATIVE | Facility: OTHER | Age: 61
End: 2021-10-29
Payer: COMMERCIAL

## 2021-10-29 DIAGNOSIS — Z01.818 PRE-OP TESTING: ICD-10-CM

## 2021-10-29 PROCEDURE — U0005 INFEC AGEN DETEC AMPLI PROBE: HCPCS | Performed by: FAMILY MEDICINE

## 2021-10-29 PROCEDURE — U0003 INFECTIOUS AGENT DETECTION BY NUCLEIC ACID (DNA OR RNA); SEVERE ACUTE RESPIRATORY SYNDROME CORONAVIRUS 2 (SARS-COV-2) (CORONAVIRUS DISEASE [COVID-19]), AMPLIFIED PROBE TECHNIQUE, MAKING USE OF HIGH THROUGHPUT TECHNOLOGIES AS DESCRIBED BY CMS-2020-01-R: HCPCS | Performed by: FAMILY MEDICINE

## 2021-10-30 LAB
SARS-COV-2 RNA RESP QL NAA+PROBE: NOT DETECTED
SARS-COV-2- CYCLE NUMBER: NORMAL

## 2021-11-01 ENCOUNTER — ANESTHESIA (OUTPATIENT)
Dept: ENDOSCOPY | Facility: HOSPITAL | Age: 61
End: 2021-11-01
Payer: COMMERCIAL

## 2021-11-01 ENCOUNTER — ANESTHESIA EVENT (OUTPATIENT)
Dept: ENDOSCOPY | Facility: HOSPITAL | Age: 61
End: 2021-11-01
Payer: COMMERCIAL

## 2021-11-01 ENCOUNTER — HOSPITAL ENCOUNTER (OUTPATIENT)
Facility: HOSPITAL | Age: 61
Discharge: HOME | End: 2021-11-01
Attending: STUDENT IN AN ORGANIZED HEALTH CARE EDUCATION/TRAINING PROGRAM | Admitting: STUDENT IN AN ORGANIZED HEALTH CARE EDUCATION/TRAINING PROGRAM
Payer: COMMERCIAL

## 2021-11-01 ENCOUNTER — OFFICE VISIT (OUTPATIENT)
Dept: OBSTETRICS AND GYNECOLOGY | Facility: CLINIC | Age: 61
End: 2021-11-01
Payer: COMMERCIAL

## 2021-11-01 VITALS
HEART RATE: 58 BPM | OXYGEN SATURATION: 100 % | RESPIRATION RATE: 18 BRPM | TEMPERATURE: 97 F | SYSTOLIC BLOOD PRESSURE: 129 MMHG | DIASTOLIC BLOOD PRESSURE: 71 MMHG

## 2021-11-01 VITALS — SYSTOLIC BLOOD PRESSURE: 110 MMHG | BODY MASS INDEX: 20.18 KG/M2 | DIASTOLIC BLOOD PRESSURE: 72 MMHG | WEIGHT: 96.56 LBS

## 2021-11-01 DIAGNOSIS — N81.10 BADEN-WALKER GRADE 2 CYSTOCELE: ICD-10-CM

## 2021-11-01 DIAGNOSIS — Z46.89 PESSARY MAINTENANCE: Primary | ICD-10-CM

## 2021-11-01 DIAGNOSIS — Z12.11 COLON CANCER SCREENING: ICD-10-CM

## 2021-11-01 DIAGNOSIS — N81.4 CYSTOCELE WITH UTERINE PROLAPSE: ICD-10-CM

## 2021-11-01 PROCEDURE — 3044F HG A1C LEVEL LT 7.0%: CPT | Mod: CPTII,S$GLB,, | Performed by: OBSTETRICS & GYNECOLOGY

## 2021-11-01 PROCEDURE — 3078F DIAST BP <80 MM HG: CPT | Mod: CPTII,S$GLB,, | Performed by: OBSTETRICS & GYNECOLOGY

## 2021-11-01 PROCEDURE — 3044F PR MOST RECENT HEMOGLOBIN A1C LEVEL <7.0%: ICD-10-PCS | Mod: CPTII,S$GLB,, | Performed by: OBSTETRICS & GYNECOLOGY

## 2021-11-01 PROCEDURE — 63600175 PHARM REV CODE 636 W HCPCS: Performed by: REGISTERED NURSE

## 2021-11-01 PROCEDURE — 3074F PR MOST RECENT SYSTOLIC BLOOD PRESSURE < 130 MM HG: ICD-10-PCS | Mod: CPTII,S$GLB,, | Performed by: OBSTETRICS & GYNECOLOGY

## 2021-11-01 PROCEDURE — 3078F PR MOST RECENT DIASTOLIC BLOOD PRESSURE < 80 MM HG: ICD-10-PCS | Mod: CPTII,S$GLB,, | Performed by: OBSTETRICS & GYNECOLOGY

## 2021-11-01 PROCEDURE — 25000003 PHARM REV CODE 250: Performed by: REGISTERED NURSE

## 2021-11-01 PROCEDURE — 99213 OFFICE O/P EST LOW 20 MIN: CPT | Mod: S$GLB,,, | Performed by: OBSTETRICS & GYNECOLOGY

## 2021-11-01 PROCEDURE — 3008F BODY MASS INDEX DOCD: CPT | Mod: CPTII,S$GLB,, | Performed by: OBSTETRICS & GYNECOLOGY

## 2021-11-01 PROCEDURE — G0121 COLON CA SCRN NOT HI RSK IND: ICD-10-PCS | Mod: ,,, | Performed by: STUDENT IN AN ORGANIZED HEALTH CARE EDUCATION/TRAINING PROGRAM

## 2021-11-01 PROCEDURE — D9220A PRA ANESTHESIA: Mod: ,,, | Performed by: ANESTHESIOLOGY

## 2021-11-01 PROCEDURE — 37000009 HC ANESTHESIA EA ADD 15 MINS: Performed by: STUDENT IN AN ORGANIZED HEALTH CARE EDUCATION/TRAINING PROGRAM

## 2021-11-01 PROCEDURE — D9220A PRA ANESTHESIA: Mod: ,,, | Performed by: REGISTERED NURSE

## 2021-11-01 PROCEDURE — 1159F MED LIST DOCD IN RCRD: CPT | Mod: CPTII,S$GLB,, | Performed by: OBSTETRICS & GYNECOLOGY

## 2021-11-01 PROCEDURE — 37000008 HC ANESTHESIA 1ST 15 MINUTES: Performed by: STUDENT IN AN ORGANIZED HEALTH CARE EDUCATION/TRAINING PROGRAM

## 2021-11-01 PROCEDURE — 1159F PR MEDICATION LIST DOCUMENTED IN MEDICAL RECORD: ICD-10-PCS | Mod: CPTII,S$GLB,, | Performed by: OBSTETRICS & GYNECOLOGY

## 2021-11-01 PROCEDURE — G0121 COLON CA SCRN NOT HI RSK IND: HCPCS | Mod: ,,, | Performed by: STUDENT IN AN ORGANIZED HEALTH CARE EDUCATION/TRAINING PROGRAM

## 2021-11-01 PROCEDURE — 99999 PR PBB SHADOW E&M-EST. PATIENT-LVL III: ICD-10-PCS | Mod: PBBFAC,,, | Performed by: OBSTETRICS & GYNECOLOGY

## 2021-11-01 PROCEDURE — 3074F SYST BP LT 130 MM HG: CPT | Mod: CPTII,S$GLB,, | Performed by: OBSTETRICS & GYNECOLOGY

## 2021-11-01 PROCEDURE — 3008F PR BODY MASS INDEX (BMI) DOCUMENTED: ICD-10-PCS | Mod: CPTII,S$GLB,, | Performed by: OBSTETRICS & GYNECOLOGY

## 2021-11-01 PROCEDURE — G0121 COLON CA SCRN NOT HI RSK IND: HCPCS | Performed by: STUDENT IN AN ORGANIZED HEALTH CARE EDUCATION/TRAINING PROGRAM

## 2021-11-01 PROCEDURE — D9220A PRA ANESTHESIA: ICD-10-PCS | Mod: ,,, | Performed by: ANESTHESIOLOGY

## 2021-11-01 PROCEDURE — D9220A PRA ANESTHESIA: ICD-10-PCS | Mod: ,,, | Performed by: REGISTERED NURSE

## 2021-11-01 PROCEDURE — 99999 PR PBB SHADOW E&M-EST. PATIENT-LVL III: CPT | Mod: PBBFAC,,, | Performed by: OBSTETRICS & GYNECOLOGY

## 2021-11-01 PROCEDURE — 99213 PR OFFICE/OUTPT VISIT, EST, LEVL III, 20-29 MIN: ICD-10-PCS | Mod: S$GLB,,, | Performed by: OBSTETRICS & GYNECOLOGY

## 2021-11-01 RX ORDER — LIDOCAINE HCL/PF 100 MG/5ML
SYRINGE (ML) INTRAVENOUS
Status: DISCONTINUED | OUTPATIENT
Start: 2021-11-01 | End: 2021-11-01

## 2021-11-01 RX ORDER — PROPOFOL 10 MG/ML
INJECTION, EMULSION INTRAVENOUS
Status: DISCONTINUED | OUTPATIENT
Start: 2021-11-01 | End: 2021-11-01

## 2021-11-01 RX ORDER — LIDOCAINE HYDROCHLORIDE 20 MG/ML
INJECTION, SOLUTION EPIDURAL; INFILTRATION; INTRACAUDAL; PERINEURAL
Status: DISCONTINUED
Start: 2021-11-01 | End: 2021-11-01 | Stop reason: HOSPADM

## 2021-11-01 RX ORDER — PROPOFOL 10 MG/ML
INJECTION, EMULSION INTRAVENOUS
Status: DISCONTINUED
Start: 2021-11-01 | End: 2021-11-01 | Stop reason: HOSPADM

## 2021-11-01 RX ORDER — SODIUM CHLORIDE 9 MG/ML
INJECTION, SOLUTION INTRAVENOUS CONTINUOUS
Status: DISCONTINUED | OUTPATIENT
Start: 2021-11-01 | End: 2021-11-01 | Stop reason: HOSPADM

## 2021-11-01 RX ADMIN — SODIUM CHLORIDE: 0.9 INJECTION, SOLUTION INTRAVENOUS at 08:11

## 2021-11-01 RX ADMIN — PROPOFOL 50 MG: 10 INJECTION, EMULSION INTRAVENOUS at 09:11

## 2021-11-01 RX ADMIN — PROPOFOL 40 MG: 10 INJECTION, EMULSION INTRAVENOUS at 09:11

## 2021-11-01 RX ADMIN — LIDOCAINE HYDROCHLORIDE 50 MG: 20 INJECTION, SOLUTION INTRAVENOUS at 09:11

## 2021-11-03 ENCOUNTER — PATIENT MESSAGE (OUTPATIENT)
Dept: INTERNAL MEDICINE | Facility: CLINIC | Age: 61
End: 2021-11-03
Payer: COMMERCIAL

## 2021-12-20 ENCOUNTER — OFFICE VISIT (OUTPATIENT)
Dept: OBSTETRICS AND GYNECOLOGY | Facility: CLINIC | Age: 61
End: 2021-12-20
Payer: COMMERCIAL

## 2021-12-20 VITALS
BODY MASS INDEX: 20.78 KG/M2 | HEIGHT: 58 IN | DIASTOLIC BLOOD PRESSURE: 80 MMHG | WEIGHT: 99 LBS | SYSTOLIC BLOOD PRESSURE: 130 MMHG

## 2021-12-20 DIAGNOSIS — Z46.89 ENCOUNTER FOR FITTING AND ADJUSTMENT OF PESSARY: Primary | ICD-10-CM

## 2021-12-20 DIAGNOSIS — N39.3 SUI (STRESS URINARY INCONTINENCE, FEMALE): ICD-10-CM

## 2021-12-20 DIAGNOSIS — N81.4 CYSTOCELE WITH UTERINE PROLAPSE: ICD-10-CM

## 2021-12-20 PROCEDURE — 99999 PR PBB SHADOW E&M-EST. PATIENT-LVL I: CPT | Mod: PBBFAC,,, | Performed by: OBSTETRICS & GYNECOLOGY

## 2021-12-20 PROCEDURE — 57160 PR FIT/INSERT INTRAVAG SUPPORT DEVICE: ICD-10-PCS | Mod: S$GLB,,, | Performed by: OBSTETRICS & GYNECOLOGY

## 2021-12-20 PROCEDURE — 99499 UNLISTED E&M SERVICE: CPT | Mod: S$GLB,,, | Performed by: OBSTETRICS & GYNECOLOGY

## 2021-12-20 PROCEDURE — 57160 INSERT PESSARY/OTHER DEVICE: CPT | Mod: S$GLB,,, | Performed by: OBSTETRICS & GYNECOLOGY

## 2021-12-20 PROCEDURE — 99499 NO LOS: ICD-10-PCS | Mod: S$GLB,,, | Performed by: OBSTETRICS & GYNECOLOGY

## 2021-12-20 PROCEDURE — 99999 PR PBB SHADOW E&M-EST. PATIENT-LVL I: ICD-10-PCS | Mod: PBBFAC,,, | Performed by: OBSTETRICS & GYNECOLOGY

## 2022-02-11 ENCOUNTER — CLINICAL SUPPORT (OUTPATIENT)
Dept: OTHER | Facility: CLINIC | Age: 62
End: 2022-02-11
Payer: COMMERCIAL

## 2022-02-11 DIAGNOSIS — Z00.8 ENCOUNTER FOR OTHER GENERAL EXAMINATION: ICD-10-CM

## 2022-02-12 VITALS — BODY MASS INDEX: 20.69 KG/M2 | HEIGHT: 58 IN

## 2022-02-12 LAB
GLUCOSE SERPL-MCNC: 108 MG/DL (ref 60–140)
HDLC SERPL-MCNC: 70 MG/DL
POC CHOLESTEROL, LDL (DOCK): 115 MG/DL
POC CHOLESTEROL, TOTAL: 198 MG/DL
TRIGL SERPL-MCNC: 66 MG/DL

## 2022-02-15 ENCOUNTER — HOSPITAL ENCOUNTER (OUTPATIENT)
Dept: RADIOLOGY | Facility: HOSPITAL | Age: 62
Discharge: HOME | End: 2022-02-15
Attending: FAMILY MEDICINE
Payer: COMMERCIAL

## 2022-02-15 ENCOUNTER — OFFICE VISIT (OUTPATIENT)
Dept: FAMILY MEDICINE | Facility: CLINIC | Age: 62
End: 2022-02-15
Payer: COMMERCIAL

## 2022-02-15 VITALS
BODY MASS INDEX: 20.55 KG/M2 | HEIGHT: 58 IN | WEIGHT: 97.88 LBS | SYSTOLIC BLOOD PRESSURE: 122 MMHG | DIASTOLIC BLOOD PRESSURE: 74 MMHG | TEMPERATURE: 99 F | OXYGEN SATURATION: 97 % | HEART RATE: 86 BPM

## 2022-02-15 DIAGNOSIS — S61.222D: ICD-10-CM

## 2022-02-15 DIAGNOSIS — S61.222D: Primary | ICD-10-CM

## 2022-02-15 PROCEDURE — 99999 PR PBB SHADOW E&M-EST. PATIENT-LVL III: CPT | Mod: PBBFAC,,, | Performed by: FAMILY MEDICINE

## 2022-02-15 PROCEDURE — 1160F PR REVIEW ALL MEDS BY PRESCRIBER/CLIN PHARMACIST DOCUMENTED: ICD-10-PCS | Mod: CPTII,S$GLB,, | Performed by: FAMILY MEDICINE

## 2022-02-15 PROCEDURE — 99214 OFFICE O/P EST MOD 30 MIN: CPT | Mod: 25,S$GLB,, | Performed by: FAMILY MEDICINE

## 2022-02-15 PROCEDURE — 73130 X-RAY EXAM OF HAND: CPT | Mod: 26,RT,, | Performed by: RADIOLOGY

## 2022-02-15 PROCEDURE — 3008F BODY MASS INDEX DOCD: CPT | Mod: CPTII,S$GLB,, | Performed by: FAMILY MEDICINE

## 2022-02-15 PROCEDURE — 3074F SYST BP LT 130 MM HG: CPT | Mod: CPTII,S$GLB,, | Performed by: FAMILY MEDICINE

## 2022-02-15 PROCEDURE — 1160F RVW MEDS BY RX/DR IN RCRD: CPT | Mod: CPTII,S$GLB,, | Performed by: FAMILY MEDICINE

## 2022-02-15 PROCEDURE — 10120 INC&RMVL FB SUBQ TISS SMPL: CPT | Mod: S$GLB,,, | Performed by: FAMILY MEDICINE

## 2022-02-15 PROCEDURE — 3078F PR MOST RECENT DIASTOLIC BLOOD PRESSURE < 80 MM HG: ICD-10-PCS | Mod: CPTII,S$GLB,, | Performed by: FAMILY MEDICINE

## 2022-02-15 PROCEDURE — 3008F PR BODY MASS INDEX (BMI) DOCUMENTED: ICD-10-PCS | Mod: CPTII,S$GLB,, | Performed by: FAMILY MEDICINE

## 2022-02-15 PROCEDURE — 3078F DIAST BP <80 MM HG: CPT | Mod: CPTII,S$GLB,, | Performed by: FAMILY MEDICINE

## 2022-02-15 PROCEDURE — 3074F PR MOST RECENT SYSTOLIC BLOOD PRESSURE < 130 MM HG: ICD-10-PCS | Mod: CPTII,S$GLB,, | Performed by: FAMILY MEDICINE

## 2022-02-15 PROCEDURE — 73130 X-RAY EXAM OF HAND: CPT | Mod: TC,FY,PO,RT

## 2022-02-15 PROCEDURE — 1159F MED LIST DOCD IN RCRD: CPT | Mod: CPTII,S$GLB,, | Performed by: FAMILY MEDICINE

## 2022-02-15 PROCEDURE — 99214 PR OFFICE/OUTPT VISIT, EST, LEVL IV, 30-39 MIN: ICD-10-PCS | Mod: 25,S$GLB,, | Performed by: FAMILY MEDICINE

## 2022-02-15 PROCEDURE — 99999 PR PBB SHADOW E&M-EST. PATIENT-LVL III: ICD-10-PCS | Mod: PBBFAC,,, | Performed by: FAMILY MEDICINE

## 2022-02-15 PROCEDURE — 73130 XR HAND COMPLETE 3 VIEW RIGHT: ICD-10-PCS | Mod: 26,RT,, | Performed by: RADIOLOGY

## 2022-02-15 PROCEDURE — 10120 DESTRUCTION, BENIGN LESION: ICD-10-PCS | Mod: S$GLB,,, | Performed by: FAMILY MEDICINE

## 2022-02-15 PROCEDURE — 1159F PR MEDICATION LIST DOCUMENTED IN MEDICAL RECORD: ICD-10-PCS | Mod: CPTII,S$GLB,, | Performed by: FAMILY MEDICINE

## 2022-02-15 NOTE — PROGRESS NOTES
"Routine Office Visit    [facility]  1960  [id number]      Subjective     Mercedes is a 61 y.o. female who presents today for:    1. Right 3rd finger injury - started approximately 2 weeks ago. Patient was cleaning out a drawer and thinks she may have got a piece of glass into her finger.     Objective     Review of Systems   Constitutional: Negative for chills and fever.   HENT: Negative for congestion.    Eyes: Negative for blurred vision.   Respiratory: Negative for cough.    Cardiovascular: Negative for chest pain.   Gastrointestinal: Negative for abdominal pain, constipation, diarrhea, heartburn, nausea and vomiting.   Genitourinary: Negative for dysuria.   Musculoskeletal: Negative for myalgias.   Skin: Negative for itching and rash.   Neurological: Negative for dizziness and headaches.   Psychiatric/Behavioral: Negative for depression.       /74 (BP Location: Left arm, Patient Position: Sitting, BP Method: Medium (Manual))   Pulse 86   Temp 98.8 °F (37.1 °C) (Oral)   Ht 4' 10" (1.473 m)   Wt 44.4 kg (97 lb 14.2 oz)   SpO2 97%   BMI 20.46 kg/m²   Physical Exam  Constitutional:       Appearance: She is well-developed and well-nourished.   HENT:      Head: Normocephalic and atraumatic.   Eyes:      Extraocular Movements: EOM normal.      Conjunctiva/sclera: Conjunctivae normal.      Pupils: Pupils are equal, round, and reactive to light.   Cardiovascular:      Rate and Rhythm: Normal rate and regular rhythm.      Heart sounds: Normal heart sounds. No murmur heard.  No friction rub. No gallop.    Pulmonary:      Effort: No respiratory distress.      Breath sounds: Normal breath sounds.   Abdominal:      General: Bowel sounds are normal. There is no distension.      Palpations: Abdomen is soft.      Tenderness: There is no abdominal tenderness.   Musculoskeletal:         General: Normal range of motion.      Cervical back: Normal range of motion and neck supple.   Lymphadenopathy:      Cervical: No cervical " adenopathy.   Skin:     General: Skin is warm.   Neurological:      Mental Status: She is alert and oriented to person, place, and time.   Psychiatric:         Mood and Affect: Mood and affect normal.           Assessment     Problem List Items Addressed This Visit    None     Visit Diagnoses     Laceration of right middle finger with foreign body without damage to nail, subsequent encounter    -  Primary    Relevant Orders    X-Ray Hand Complete Right (Completed)    Destruction, Benign Lesion (Completed)  No foreign object identified on x-ray  Removed splinter that was visible in clinic   Patient tolerated procedure  Recommend antibiotic ointment   F/u if needed             No follow-ups on file.

## 2022-02-17 NOTE — PROCEDURES
Destruction, Benign Lesion    Date/Time: 2/15/2022 2:20 PM  Performed by: Sandra Pennington MD  Authorized by: Sandra Pennington MD     Consent Done?:  Yes (Verbal)  Pre-Procedure:     Local anesthesia used?: Yes      Local anesthetic:  Lidocaine 2% without epinephrine  Indications:     other (splinter removal )  Location:     Upper Extremity:  Hand    Detail:  Right long finger  Procedure Details:     Cosmetic?: No      Number of lesions:  1    Destruction method:  Other (slowly pared area of injury until splinter removed by me. )    Sterile dressings:  Other (comments) (band-aid)    Bleeding:  None     Patient tolerated the procedure well with no immediate complications.     Post-operative instructions were provided for the patient.

## 2022-05-25 ENCOUNTER — PATIENT MESSAGE (OUTPATIENT)
Dept: ADMINISTRATIVE | Facility: HOSPITAL | Age: 62
End: 2022-05-25
Payer: COMMERCIAL

## 2022-05-25 DIAGNOSIS — Z12.31 OTHER SCREENING MAMMOGRAM: ICD-10-CM

## 2022-05-30 ENCOUNTER — PATIENT MESSAGE (OUTPATIENT)
Dept: ADMINISTRATIVE | Facility: HOSPITAL | Age: 62
End: 2022-05-30
Payer: COMMERCIAL

## 2022-07-12 ENCOUNTER — PATIENT MESSAGE (OUTPATIENT)
Dept: ADMINISTRATIVE | Facility: HOSPITAL | Age: 62
End: 2022-07-12
Payer: COMMERCIAL

## 2022-07-22 ENCOUNTER — OFFICE VISIT (OUTPATIENT)
Dept: OPTOMETRY | Facility: CLINIC | Age: 62
End: 2022-07-22
Payer: COMMERCIAL

## 2022-07-22 DIAGNOSIS — H52.4 HYPEROPIA WITH ASTIGMATISM AND PRESBYOPIA, BILATERAL: ICD-10-CM

## 2022-07-22 DIAGNOSIS — H52.203 HYPEROPIA WITH ASTIGMATISM AND PRESBYOPIA, BILATERAL: ICD-10-CM

## 2022-07-22 DIAGNOSIS — R42 DIZZINESS: Primary | ICD-10-CM

## 2022-07-22 DIAGNOSIS — H25.13 SENILE NUCLEAR SCLEROSIS, BILATERAL: ICD-10-CM

## 2022-07-22 DIAGNOSIS — H40.013 OAG (OPEN ANGLE GLAUCOMA) SUSPECT, LOW RISK, BILATERAL: ICD-10-CM

## 2022-07-22 DIAGNOSIS — H04.123 DRY EYE SYNDROME OF BOTH EYES: ICD-10-CM

## 2022-07-22 DIAGNOSIS — H52.03 HYPEROPIA WITH ASTIGMATISM AND PRESBYOPIA, BILATERAL: ICD-10-CM

## 2022-07-22 PROCEDURE — 1160F PR REVIEW ALL MEDS BY PRESCRIBER/CLIN PHARMACIST DOCUMENTED: ICD-10-PCS | Mod: CPTII,S$GLB,, | Performed by: OPTOMETRIST

## 2022-07-22 PROCEDURE — 92133 POSTERIOR SEGMENT OCT OPTIC NERVE(OCULAR COHERENCE TOMOGRAPHY) - OU - BOTH EYES: ICD-10-PCS | Mod: S$GLB,,, | Performed by: OPTOMETRIST

## 2022-07-22 PROCEDURE — 92015 DETERMINE REFRACTIVE STATE: CPT | Mod: S$GLB,,, | Performed by: OPTOMETRIST

## 2022-07-22 PROCEDURE — 92004 COMPRE OPH EXAM NEW PT 1/>: CPT | Mod: S$GLB,,, | Performed by: OPTOMETRIST

## 2022-07-22 PROCEDURE — 92015 PR REFRACTION: ICD-10-PCS | Mod: S$GLB,,, | Performed by: OPTOMETRIST

## 2022-07-22 PROCEDURE — 99999 PR PBB SHADOW E&M-EST. PATIENT-LVL II: ICD-10-PCS | Mod: PBBFAC,,, | Performed by: OPTOMETRIST

## 2022-07-22 PROCEDURE — 1159F PR MEDICATION LIST DOCUMENTED IN MEDICAL RECORD: ICD-10-PCS | Mod: CPTII,S$GLB,, | Performed by: OPTOMETRIST

## 2022-07-22 PROCEDURE — 99999 PR PBB SHADOW E&M-EST. PATIENT-LVL II: CPT | Mod: PBBFAC,,, | Performed by: OPTOMETRIST

## 2022-07-22 PROCEDURE — 92004 PR EYE EXAM, NEW PATIENT,COMPREHESV: ICD-10-PCS | Mod: S$GLB,,, | Performed by: OPTOMETRIST

## 2022-07-22 PROCEDURE — 1159F MED LIST DOCD IN RCRD: CPT | Mod: CPTII,S$GLB,, | Performed by: OPTOMETRIST

## 2022-07-22 PROCEDURE — 92133 CPTRZD OPH DX IMG PST SGM ON: CPT | Mod: S$GLB,,, | Performed by: OPTOMETRIST

## 2022-07-22 PROCEDURE — 1160F RVW MEDS BY RX/DR IN RCRD: CPT | Mod: CPTII,S$GLB,, | Performed by: OPTOMETRIST

## 2022-07-22 NOTE — PROGRESS NOTES
BRYANNA MOORE: about 1 yr.ago elsewhere  Chief complaint (CC): Patient has dry eyes and eyes sometimes hurt and   vision gets blurred when reading. Using AT's BID.   Patient has also been   getting dizzy for 2 years and wanted to rule out her eyes.  Glasses? +  Contacts? -  H/o eye surgery, injections or laser: -  H/o eye injury: -  Known eye conditions? -  Family h/o eye conditions? -  Eye gtts? AT's BID OU      (-) Flashes (-)  Floaters (-) Mucous   (-)  Tearing (+) Itching (-) Burning   (-) Headaches (+) Eye Pain/discomfort (-) Irritation   (-)  Redness (-) Double vision (-) Blurry vision    Diabetic? -  A1c? -        Last edited by Amy Serrato on 7/22/2022  2:46 PM. (History)            Assessment /Plan     For exam results, see Encounter Report.    Dizziness    Dry eye syndrome of both eyes    Senile nuclear sclerosis, bilateral    Hyperopia with astigmatism and presbyopia, bilateral    OAG (open angle glaucoma) suspect, low risk, bilateral    1. No e/o ocular pathology found in association. Longstanding for 2 years. Monitor.   2. Increase Refresh tears to TID-QID OU.   3.Nuclear sclerotic cataract - not visually significant. Observe.  4. (-) fHx.IOP 15 OD, OS.c/d 0.5 OD, OS.   7/22/2022 DFE  7/22/2022 OCT WNL OU  Educated pt on findings w/understanding. RTC 1 year Routine

## 2022-09-12 ENCOUNTER — OFFICE VISIT (OUTPATIENT)
Dept: PRIMARY CARE CLINIC | Facility: CLINIC | Age: 62
End: 2022-09-12
Payer: COMMERCIAL

## 2022-09-12 ENCOUNTER — HOSPITAL ENCOUNTER (OUTPATIENT)
Dept: RADIOLOGY | Facility: HOSPITAL | Age: 62
Discharge: HOME | End: 2022-09-12
Attending: FAMILY MEDICINE
Payer: COMMERCIAL

## 2022-09-12 ENCOUNTER — PATIENT MESSAGE (OUTPATIENT)
Dept: PRIMARY CARE CLINIC | Facility: CLINIC | Age: 62
End: 2022-09-12

## 2022-09-12 VITALS
OXYGEN SATURATION: 99 % | DIASTOLIC BLOOD PRESSURE: 74 MMHG | WEIGHT: 96.5 LBS | SYSTOLIC BLOOD PRESSURE: 126 MMHG | TEMPERATURE: 98 F | BODY MASS INDEX: 20.26 KG/M2 | HEART RATE: 78 BPM | HEIGHT: 58 IN

## 2022-09-12 DIAGNOSIS — M54.2 NECK PAIN: ICD-10-CM

## 2022-09-12 DIAGNOSIS — R20.0 NUMBNESS AND TINGLING OF LEFT HAND: ICD-10-CM

## 2022-09-12 DIAGNOSIS — R20.2 NUMBNESS AND TINGLING OF LEFT HAND: ICD-10-CM

## 2022-09-12 DIAGNOSIS — Z86.16 HISTORY OF COVID-19: ICD-10-CM

## 2022-09-12 DIAGNOSIS — M79.641 PAIN OF RIGHT HAND: ICD-10-CM

## 2022-09-12 DIAGNOSIS — Z96.0 PRESENCE OF PESSARY: ICD-10-CM

## 2022-09-12 DIAGNOSIS — Z00.00 ANNUAL PHYSICAL EXAM: Primary | ICD-10-CM

## 2022-09-12 DIAGNOSIS — M81.0 AGE-RELATED OSTEOPOROSIS WITHOUT CURRENT PATHOLOGICAL FRACTURE: ICD-10-CM

## 2022-09-12 DIAGNOSIS — N39.46 MIXED INCONTINENCE URGE AND STRESS (MALE)(FEMALE): ICD-10-CM

## 2022-09-12 DIAGNOSIS — Z78.0 POST-MENOPAUSAL: ICD-10-CM

## 2022-09-12 PROCEDURE — 72050 X-RAY EXAM NECK SPINE 4/5VWS: CPT | Mod: 26,,, | Performed by: RADIOLOGY

## 2022-09-12 PROCEDURE — 3008F PR BODY MASS INDEX (BMI) DOCUMENTED: ICD-10-PCS | Mod: CPTII,S$GLB,, | Performed by: FAMILY MEDICINE

## 2022-09-12 PROCEDURE — 73130 X-RAY EXAM OF HAND: CPT | Mod: TC,50,PN

## 2022-09-12 PROCEDURE — 72050 XR CERVICAL SPINE COMPLETE 5 VIEW: ICD-10-PCS | Mod: 26,,, | Performed by: RADIOLOGY

## 2022-09-12 PROCEDURE — 72050 X-RAY EXAM NECK SPINE 4/5VWS: CPT | Mod: TC,PN

## 2022-09-12 PROCEDURE — 1159F PR MEDICATION LIST DOCUMENTED IN MEDICAL RECORD: ICD-10-PCS | Mod: CPTII,S$GLB,, | Performed by: FAMILY MEDICINE

## 2022-09-12 PROCEDURE — 3078F DIAST BP <80 MM HG: CPT | Mod: CPTII,S$GLB,, | Performed by: FAMILY MEDICINE

## 2022-09-12 PROCEDURE — 73130 X-RAY EXAM OF HAND: CPT | Mod: 26,,, | Performed by: RADIOLOGY

## 2022-09-12 PROCEDURE — 99396 PR PREVENTIVE VISIT,EST,40-64: ICD-10-PCS | Mod: S$GLB,,, | Performed by: FAMILY MEDICINE

## 2022-09-12 PROCEDURE — 3078F PR MOST RECENT DIASTOLIC BLOOD PRESSURE < 80 MM HG: ICD-10-PCS | Mod: CPTII,S$GLB,, | Performed by: FAMILY MEDICINE

## 2022-09-12 PROCEDURE — 1159F MED LIST DOCD IN RCRD: CPT | Mod: CPTII,S$GLB,, | Performed by: FAMILY MEDICINE

## 2022-09-12 PROCEDURE — 99999 PR PBB SHADOW E&M-EST. PATIENT-LVL V: ICD-10-PCS | Mod: PBBFAC,,, | Performed by: FAMILY MEDICINE

## 2022-09-12 PROCEDURE — 3008F BODY MASS INDEX DOCD: CPT | Mod: CPTII,S$GLB,, | Performed by: FAMILY MEDICINE

## 2022-09-12 PROCEDURE — 3074F SYST BP LT 130 MM HG: CPT | Mod: CPTII,S$GLB,, | Performed by: FAMILY MEDICINE

## 2022-09-12 PROCEDURE — 99396 PREV VISIT EST AGE 40-64: CPT | Mod: S$GLB,,, | Performed by: FAMILY MEDICINE

## 2022-09-12 PROCEDURE — 73130 XR HAND COMPLETE 3 VIEWS BILATERAL: ICD-10-PCS | Mod: 26,,, | Performed by: RADIOLOGY

## 2022-09-12 PROCEDURE — 99999 PR PBB SHADOW E&M-EST. PATIENT-LVL V: CPT | Mod: PBBFAC,,, | Performed by: FAMILY MEDICINE

## 2022-09-12 PROCEDURE — 3074F PR MOST RECENT SYSTOLIC BLOOD PRESSURE < 130 MM HG: ICD-10-PCS | Mod: CPTII,S$GLB,, | Performed by: FAMILY MEDICINE

## 2022-09-12 NOTE — PROGRESS NOTES
Subjective:       Patient ID: Mercedes Perez is a 62 y.o. female.    Chief Complaint: Annual Exam    HPI  61 y/o female with Osteoporosis, Mixed incontinence is here for her annual exam.       She is feels good, she has R hand 3rd and 5th digit bony changes, she reports it started over the past year, she reports when she works more with her hands she has swelling and pain in those fingers and joints, she does not notice weakness but she has been dropping things occasionally, she massages her hand and has tried Iburprofen which helps some. She also feels like she still has glass in R 3rd digit. She is R handed. She has some numbness of index finger on L hand. She endorses neck pain for many years, no accident or injury, she carries heavy objects at work, pain is achy worse with certain movements, mainly turning left or right or up and down, 3/10, pain sometimes goes down to left shoulder. She completed PT in 2021 with no change in neck symptoms or numbness in L finger. She reports in the morning when she steps out of bed her feet are sore for about 5 min and it resolves. She denies f/n/v/d/constipation/cp/palpitations/sob, she has overactive bladder, pessary in place. She is eating regularly, she is doing arm bike for 30 min most days and her PT exercises. Sleeping ok. Mood ok.     She notes after her Reclast infusion she felt sick with flu like symptoms body aches for 4-5 days, would like to try another medication if needed     Hx of Covid-19 11/2020 mild asymptomatic was tested because of exposure, 2021  Vestibular migraines: Dr. Jazmín lopez  Mixed incontinence/cystocele: following with Urogyn, she tried ditropan but it was not helpful, topical estrogen cream not helpful, Pessary in place  Osteoporosis: she is taking 1200 mg of Calcium and 1000 IU D3, Dexa 6/2020, Reclast x 1 9/30/21  Eye exam 7/2022 glaucoma suspect, Dr. Villalta  Dental utd  GYN: following with Dr. Boyle, 12/2021, mmg 5/2021  Colonoscopy 11/2021 repeat 10  "years    Lipids 2/2022 ok     Review of Systems  see HPI  Objective:      /74 (BP Location: Left arm, Patient Position: Sitting, BP Method: Medium (Manual))   Pulse 78   Temp 98 °F (36.7 °C) (Oral)   Ht 4' 10" (1.473 m)   Wt 43.8 kg (96 lb 8.3 oz)   SpO2 99%   BMI 20.17 kg/m²   Physical Exam  Vitals and nursing note reviewed.   Constitutional:       Appearance: She is well-developed.   HENT:      Head: Normocephalic and atraumatic.   Neck:      Thyroid: No thyromegaly.   Cardiovascular:      Rate and Rhythm: Normal rate and regular rhythm.      Heart sounds: Normal heart sounds.   Pulmonary:      Effort: Pulmonary effort is normal. No respiratory distress.      Breath sounds: Normal breath sounds.   Abdominal:      General: Abdomen is flat. Bowel sounds are normal. There is no distension.      Palpations: Abdomen is soft. There is no mass.      Tenderness: There is no abdominal tenderness.   Musculoskeletal:         General: Normal range of motion.      Cervical back: Normal range of motion and neck supple.      Right lower leg: No edema.      Left lower leg: No edema.      Comments: Strength 5/5 bilateral UE     Lymphadenopathy:      Cervical: No cervical adenopathy.   Skin:     General: Skin is warm and dry.   Neurological:      Mental Status: She is alert.       Assessment:       1. Annual physical exam    2. History of COVID-19    3. Pain of right hand    4. Numbness and tingling of left hand    5. Neck pain    6. Post-menopausal    7. Age-related osteoporosis without current pathological fracture    8. Presence of pessary    9. Mixed incontinence urge and stress (male)(female)        Plan:   [facility] was seen today for annual exam.    Diagnoses and all orders for this visit:    Annual physical exam  -     CBC Auto Differential; Future  -     Comprehensive Metabolic Panel; Future  -     Hemoglobin A1C; Future  -     TSH; Future  -     Vitamin D; Future    History of COVID-19    Pain of right hand  -     " Ambulatory referral/consult to Orthopedics; Future  -     X-Ray Cervical Spine Complete 5 view; Future  -     X-Ray Hand 3 View Bilateral; Future    Numbness and tingling of left hand  -     Ambulatory referral/consult to Orthopedics; Future  -     X-Ray Cervical Spine Complete 5 view; Future  -     X-Ray Hand 3 View Bilateral; Future    Neck pain  -     X-Ray Cervical Spine Complete 5 view; Future    Post-menopausal  -     DXA Bone Density Spine And Hip; Future  -     Comprehensive Metabolic Panel; Future    Age-related osteoporosis without current pathological fracture  -     DXA Bone Density Spine And Hip; Future  -     Comprehensive Metabolic Panel; Future  -     Vitamin D; Future    Presence of pessary    Mixed incontinence urge and stress (male)(female)

## 2022-09-14 ENCOUNTER — PATIENT MESSAGE (OUTPATIENT)
Dept: PRIMARY CARE CLINIC | Facility: CLINIC | Age: 62
End: 2022-09-14
Payer: COMMERCIAL

## 2022-09-29 ENCOUNTER — PATIENT MESSAGE (OUTPATIENT)
Dept: ORTHOPEDICS | Facility: CLINIC | Age: 62
End: 2022-09-29
Payer: COMMERCIAL

## 2022-09-30 ENCOUNTER — OFFICE VISIT (OUTPATIENT)
Dept: ORTHOPEDICS | Facility: CLINIC | Age: 62
End: 2022-09-30
Payer: COMMERCIAL

## 2022-09-30 ENCOUNTER — LAB VISIT (OUTPATIENT)
Dept: LAB | Facility: OTHER | Age: 62
End: 2022-09-30
Attending: PHYSICIAN ASSISTANT
Payer: COMMERCIAL

## 2022-09-30 VITALS
BODY MASS INDEX: 20.27 KG/M2 | WEIGHT: 96.56 LBS | SYSTOLIC BLOOD PRESSURE: 129 MMHG | HEIGHT: 58 IN | DIASTOLIC BLOOD PRESSURE: 79 MMHG | HEART RATE: 64 BPM

## 2022-09-30 DIAGNOSIS — R52 PAIN: Primary | ICD-10-CM

## 2022-09-30 DIAGNOSIS — M20.021 BOUTONNIERE DEFORMITY OF FINGER OF RIGHT HAND: Primary | ICD-10-CM

## 2022-09-30 DIAGNOSIS — R20.0 NUMBNESS AND TINGLING OF LEFT HAND: ICD-10-CM

## 2022-09-30 DIAGNOSIS — M79.641 PAIN OF RIGHT HAND: ICD-10-CM

## 2022-09-30 DIAGNOSIS — R20.2 NUMBNESS AND TINGLING OF LEFT HAND: ICD-10-CM

## 2022-09-30 LAB
BASOPHILS # BLD AUTO: 0.04 K/UL (ref 0–0.2)
BASOPHILS NFR BLD: 0.8 % (ref 0–1.9)
CCP AB SER IA-ACNC: <0.5 U/ML
CRP SERPL-MCNC: 0.9 MG/L (ref 0–8.2)
DIFFERENTIAL METHOD: ABNORMAL
EOSINOPHIL # BLD AUTO: 0.1 K/UL (ref 0–0.5)
EOSINOPHIL NFR BLD: 1.9 % (ref 0–8)
ERYTHROCYTE [DISTWIDTH] IN BLOOD BY AUTOMATED COUNT: 12.6 % (ref 11.5–14.5)
ERYTHROCYTE [SEDIMENTATION RATE] IN BLOOD: 11 MM/HR (ref 0–20)
HCT VFR BLD AUTO: 42.3 % (ref 37–48.5)
HGB BLD-MCNC: 14.5 G/DL (ref 12–16)
IMM GRANULOCYTES # BLD AUTO: 0.01 K/UL (ref 0–0.04)
IMM GRANULOCYTES NFR BLD AUTO: 0.2 % (ref 0–0.5)
LYMPHOCYTES # BLD AUTO: 2.1 K/UL (ref 1–4.8)
LYMPHOCYTES NFR BLD: 38.8 % (ref 18–48)
MCH RBC QN AUTO: 32.1 PG (ref 27–31)
MCHC RBC AUTO-ENTMCNC: 34.3 G/DL (ref 32–36)
MCV RBC AUTO: 94 FL (ref 82–98)
MONOCYTES # BLD AUTO: 0.2 K/UL (ref 0.3–1)
MONOCYTES NFR BLD: 4.5 % (ref 4–15)
NEUTROPHILS # BLD AUTO: 2.9 K/UL (ref 1.8–7.7)
NEUTROPHILS NFR BLD: 53.8 % (ref 38–73)
NRBC BLD-RTO: 0 /100 WBC
PLATELET # BLD AUTO: 274 K/UL (ref 150–450)
PMV BLD AUTO: 8.7 FL (ref 9.2–12.9)
RBC # BLD AUTO: 4.52 M/UL (ref 4–5.4)
RHEUMATOID FACT SERPL-ACNC: <13 IU/ML (ref 0–15)
WBC # BLD AUTO: 5.31 K/UL (ref 3.9–12.7)

## 2022-09-30 PROCEDURE — 85651 RBC SED RATE NONAUTOMATED: CPT | Performed by: PHYSICIAN ASSISTANT

## 2022-09-30 PROCEDURE — 3044F PR MOST RECENT HEMOGLOBIN A1C LEVEL <7.0%: ICD-10-PCS | Mod: CPTII,S$GLB,, | Performed by: PHYSICIAN ASSISTANT

## 2022-09-30 PROCEDURE — 81374 HLA I TYPING 1 ANTIGEN LR: CPT | Performed by: PHYSICIAN ASSISTANT

## 2022-09-30 PROCEDURE — 86431 RHEUMATOID FACTOR QUANT: CPT | Performed by: PHYSICIAN ASSISTANT

## 2022-09-30 PROCEDURE — 36415 COLL VENOUS BLD VENIPUNCTURE: CPT | Performed by: PHYSICIAN ASSISTANT

## 2022-09-30 PROCEDURE — 85025 COMPLETE CBC W/AUTO DIFF WBC: CPT | Performed by: PHYSICIAN ASSISTANT

## 2022-09-30 PROCEDURE — 3078F DIAST BP <80 MM HG: CPT | Mod: CPTII,S$GLB,, | Performed by: PHYSICIAN ASSISTANT

## 2022-09-30 PROCEDURE — 86039 ANTINUCLEAR ANTIBODIES (ANA): CPT | Performed by: PHYSICIAN ASSISTANT

## 2022-09-30 PROCEDURE — 1159F PR MEDICATION LIST DOCUMENTED IN MEDICAL RECORD: ICD-10-PCS | Mod: CPTII,S$GLB,, | Performed by: PHYSICIAN ASSISTANT

## 2022-09-30 PROCEDURE — 99999 PR PBB SHADOW E&M-EST. PATIENT-LVL III: CPT | Mod: PBBFAC,,, | Performed by: PHYSICIAN ASSISTANT

## 2022-09-30 PROCEDURE — 99204 OFFICE O/P NEW MOD 45 MIN: CPT | Mod: S$GLB,,, | Performed by: PHYSICIAN ASSISTANT

## 2022-09-30 PROCEDURE — 86140 C-REACTIVE PROTEIN: CPT | Performed by: PHYSICIAN ASSISTANT

## 2022-09-30 PROCEDURE — 86235 NUCLEAR ANTIGEN ANTIBODY: CPT | Mod: 59 | Performed by: PHYSICIAN ASSISTANT

## 2022-09-30 PROCEDURE — 3008F PR BODY MASS INDEX (BMI) DOCUMENTED: ICD-10-PCS | Mod: CPTII,S$GLB,, | Performed by: PHYSICIAN ASSISTANT

## 2022-09-30 PROCEDURE — 86038 ANTINUCLEAR ANTIBODIES: CPT | Performed by: PHYSICIAN ASSISTANT

## 2022-09-30 PROCEDURE — 3074F SYST BP LT 130 MM HG: CPT | Mod: CPTII,S$GLB,, | Performed by: PHYSICIAN ASSISTANT

## 2022-09-30 PROCEDURE — 3008F BODY MASS INDEX DOCD: CPT | Mod: CPTII,S$GLB,, | Performed by: PHYSICIAN ASSISTANT

## 2022-09-30 PROCEDURE — 3074F PR MOST RECENT SYSTOLIC BLOOD PRESSURE < 130 MM HG: ICD-10-PCS | Mod: CPTII,S$GLB,, | Performed by: PHYSICIAN ASSISTANT

## 2022-09-30 PROCEDURE — 99204 PR OFFICE/OUTPT VISIT, NEW, LEVL IV, 45-59 MIN: ICD-10-PCS | Mod: S$GLB,,, | Performed by: PHYSICIAN ASSISTANT

## 2022-09-30 PROCEDURE — 86200 CCP ANTIBODY: CPT | Performed by: PHYSICIAN ASSISTANT

## 2022-09-30 PROCEDURE — 3078F PR MOST RECENT DIASTOLIC BLOOD PRESSURE < 80 MM HG: ICD-10-PCS | Mod: CPTII,S$GLB,, | Performed by: PHYSICIAN ASSISTANT

## 2022-09-30 PROCEDURE — 99999 PR PBB SHADOW E&M-EST. PATIENT-LVL III: ICD-10-PCS | Mod: PBBFAC,,, | Performed by: PHYSICIAN ASSISTANT

## 2022-09-30 PROCEDURE — 3044F HG A1C LEVEL LT 7.0%: CPT | Mod: CPTII,S$GLB,, | Performed by: PHYSICIAN ASSISTANT

## 2022-09-30 PROCEDURE — 1159F MED LIST DOCD IN RCRD: CPT | Mod: CPTII,S$GLB,, | Performed by: PHYSICIAN ASSISTANT

## 2022-09-30 NOTE — PROGRESS NOTES
"rtSubjective:      Patient ID: Mercedes Perez is a 62 y.o. female.    Chief Complaint: Pain and Numbness of the Right Hand, Numbness of the Left Hand, and Tingling (Left and Right Hand)      HPI  Mercedes Perez is a 62 y.o. female presenting today for evaluation of the bilateral hands. She reports numbness in the left index, onset 2 years ago. Numbness is constant in the left index and intermittent in the left long. She does have occasional neck pain. On the right hand she reports extension lags at the long and small PIPs this is chronic. She has ulnar drift of fingers, denies prior dx of RA.      Review of patient's allergies indicates:   Allergen Reactions    No known drug allergies          Current Outpatient Medications   Medication Sig Dispense Refill    calcium carbonate-vitamin D3 1,000 mg(2,500 mg)-800 unit Tab Take by mouth once daily.        No current facility-administered medications for this visit.       Past Medical History:   Diagnosis Date    Allergy     Cataract     UI (urinary incontinence)     Uterine fibroid        Past Surgical History:   Procedure Laterality Date    COLONOSCOPY N/A 11/1/2021    Procedure: COLONOSCOPY;  Surgeon: Donnell Carson MD;  Location: [facility];  Service: Endoscopy;  Laterality: N/A;  covid test 10/1 [place], instructions mailed-Kpvt  Pt requests Monday AM cases  covid test 10/29/21, prep instr mailed and emailed -ml       Review of Systems:  Constitutional: Negative for chills and fever.   Respiratory: Negative for cough and shortness of breath.    Gastrointestinal: Negative for nausea and vomiting.   Skin: Negative for rash.   Neurological: Negative for dizziness and headaches.   Psychiatric/Behavioral: Negative for depression.   MSK as in HPI       OBJECTIVE:     PHYSICAL EXAM:  /79   Pulse 64   Ht 4' 10" (1.473 m)   Wt 43.8 kg (96 lb 9 oz)   BMI 20.18 kg/m²     GEN:  NAD, well-developed, well-groomed.  NEURO: Awake, alert, and oriented. Normal attention and " concentration.    PSYCH: Normal mood and affect. Behavior is normal.  HEENT: No cervical lymphadenopathy noted.  CARDIOVASCULAR: Radial pulses 2+ bilaterally. No LE edema noted.  PULMONARY: Breath sounds normal. No respiratory distress.  SKIN: Intact, no rashes.      MSK:   BUE:  Good active ROM of the wrist and fingers. AIN/PIN/Radial/Median/Ulnar Nerves assessed in isolation without deficit. Radial & Ulnar arteries palpated 2+. Capillary Refill <3s.      RADIOGRAPHS:  Xray bl hands 9/12/22   FINDINGS:  Right hand: Osseous mineralization is preserved.  No acute displaced fractures.  No suspicious lytic or blastic lesions.  No osseous erosions.  There is mild asymmetric cartilage space narrowing at the 2nd DIP and 3rd PIP joints with associated bone productive changes and mild radial/ulnar subluxation, likely degenerative in nature.  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  No dislocation.  No significant soft tissue swelling.  No soft tissue calcifications or radiopaque foreign bodies.     Left hand: Osseous mineralization is preserved.  No acute displaced fractures.  Well corticated bone fragment at the dorsal aspect of the 5th middle phalanx base, presumably related to remote trauma.  No suspicious lytic or blastic lesions.  Mild 1st CMC cartilage space narrowing with associated bone productive changes, likely degenerative in nature.  Remaining cartilage spaces are preserved.  No subluxation or dislocation.  No significant soft tissue swelling.  No soft tissue calcifications or radiopaque foreign bodies.     Impression:     As above.  Comments: I have personally reviewed the imaging and I agree with the above radiologist's report.    ASSESSMENT/PLAN:       ICD-10-CM ICD-9-CM   1. 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 Pain of right hand  M79.641 729.5   3. Numbness and tingling of left hand  R20.0  782.0    R20.2        Orders Placed This Encounter    CBC auto differential    Sedimentation rate    C-reactive protein    ADELAIDA    Cyclic citrul peptide antibody, IgG    Rheumatoid factor    HLA B27 Antigen    Ambulatory referral/consult to Physical/Occupational Therapy     Plan:   Plan for EMG to eval left hand numbness   Labs today to screen for RA   Will place therapy referral for right long and small boutonierre deformities   RTC following EMG        The patient indicates understanding of these issues and agrees to the plan.    Julee Ordaz PA-C  [facility]   [facility]  [place], [place]

## 2022-10-03 ENCOUNTER — HOSPITAL ENCOUNTER (OUTPATIENT)
Dept: RADIOLOGY | Facility: HOSPITAL | Age: 62
Discharge: HOME | End: 2022-10-03
Attending: FAMILY MEDICINE
Payer: COMMERCIAL

## 2022-10-03 ENCOUNTER — HOSPITAL ENCOUNTER (OUTPATIENT)
Dept: RADIOLOGY | Facility: CLINIC | Age: 62
Discharge: HOME | End: 2022-10-03
Attending: FAMILY MEDICINE
Payer: COMMERCIAL

## 2022-10-03 ENCOUNTER — PATIENT MESSAGE (OUTPATIENT)
Dept: ORTHOPEDICS | Facility: CLINIC | Age: 62
End: 2022-10-03
Payer: COMMERCIAL

## 2022-10-03 ENCOUNTER — CLINICAL SUPPORT (OUTPATIENT)
Dept: REHABILITATION | Facility: HOSPITAL | Age: 62
End: 2022-10-03
Payer: COMMERCIAL

## 2022-10-03 DIAGNOSIS — M79.641 PAIN OF RIGHT HAND: Primary | ICD-10-CM

## 2022-10-03 DIAGNOSIS — M81.0 AGE-RELATED OSTEOPOROSIS WITHOUT CURRENT PATHOLOGICAL FRACTURE: ICD-10-CM

## 2022-10-03 DIAGNOSIS — Z78.0 POST-MENOPAUSAL: ICD-10-CM

## 2022-10-03 DIAGNOSIS — R29.898 DECREASED PINCH STRENGTH: ICD-10-CM

## 2022-10-03 DIAGNOSIS — M20.021 BOUTONNIERE DEFORMITY OF FINGER OF RIGHT HAND: ICD-10-CM

## 2022-10-03 DIAGNOSIS — M79.644 FINGER PAIN, RIGHT: ICD-10-CM

## 2022-10-03 DIAGNOSIS — Z12.31 OTHER SCREENING MAMMOGRAM: ICD-10-CM

## 2022-10-03 LAB
ANA PATTERN 1: NORMAL
ANA SER QL IF: POSITIVE
ANA TITR SER IF: NORMAL {TITER}

## 2022-10-03 PROCEDURE — 77067 MAMMO DIGITAL SCREENING BILAT WITH TOMO: ICD-10-PCS | Mod: 26,,, | Performed by: RADIOLOGY

## 2022-10-03 PROCEDURE — 77080 DEXA BONE DENSITY SPINE HIP: ICD-10-PCS | Mod: 26,,, | Performed by: INTERNAL MEDICINE

## 2022-10-03 PROCEDURE — 77067 SCR MAMMO BI INCL CAD: CPT | Mod: 26,,, | Performed by: RADIOLOGY

## 2022-10-03 PROCEDURE — 77080 DXA BONE DENSITY AXIAL: CPT | Mod: TC,PO

## 2022-10-03 PROCEDURE — 97165 OT EVAL LOW COMPLEX 30 MIN: CPT | Mod: PN

## 2022-10-03 PROCEDURE — 77063 MAMMO DIGITAL SCREENING BILAT WITH TOMO: ICD-10-PCS | Mod: 26,,, | Performed by: RADIOLOGY

## 2022-10-03 PROCEDURE — 77063 BREAST TOMOSYNTHESIS BI: CPT | Mod: 26,,, | Performed by: RADIOLOGY

## 2022-10-03 PROCEDURE — 77063 BREAST TOMOSYNTHESIS BI: CPT | Mod: TC,PO

## 2022-10-03 PROCEDURE — 77080 DXA BONE DENSITY AXIAL: CPT | Mod: 26,,, | Performed by: INTERNAL MEDICINE

## 2022-10-03 PROCEDURE — 97110 THERAPEUTIC EXERCISES: CPT | Mod: PN

## 2022-10-03 NOTE — PLAN OF CARE
[facility] Occupational Therapy  Initial Evaluation     Date:  10/3/2022    Name: [facility]  Clinic Number: [id number]    Therapy Diagnosis:    1. 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 Finger pain, right        3. 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  History of Current Condition: deformity of small and long finger(boutiniere)  Surgical Procedure: n/a  Imaging: xray   COMPARISON:  Radiograph 02/15/2022.  FINDINGS:  Right hand: Osseous mineralization is preserved.  No acute displaced fractures.  No suspicious lytic or blastic lesions.  No osseous erosions.  There is mild asymmetric cartilage space narrowing at the 2nd DIP and 3rd PIP joints with associated bone productive changes and mild radial/ulnar subluxation, likely degenerative in nature.  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  No dislocation.  No significant soft tissue swelling.  No soft tissue calcifications or radiopaque foreign bodies.  Impression:  As above.  Electronically signed by: Adams Merino MD  Date:                                             09/12/2022    Previous Therapy: none    Past Medical History/Physical Systems Review:   Mercedes Perez  has a past medical history of Allergy, Cataract, UI (urinary incontinence), and Uterine fibroid.    Mercedes Perez  has a past surgical history that includes Colonoscopy (N/A, 11/1/2021).    Mercedes has a current medication list which includes the following prescription(s): calcium carbonate-vitamin d3.    Review of patient's allergies indicates:   Allergen Reactions    No known drug allergies         Patient's Goals for Therapy:  For my hand to look better, no pain.    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   Current Functional Status   Home/Living environment : lives with their family      Limitation of Functional Status as follows:   ADLs/IADLs:     - Feeding: I    - Bathing: I    - Dressing/Grooming: I    - Driving: I     Leisure: Gardening and social    Objective    Observation: Skin intact, mild Boutonierre deformity of right long and small finger    Sensation: Median and Ulnar Nerve Intact  [place] Monofilament Test: Yes  Results: 2.83    Special Tests: none tested due to deformity present    Wound Assessment: n/a    Edema: no edema noted right hand    Range of Motion: right Active  (Ext/Flex) Middle  right  Small right       MP 0/90 0/83      PIP -3/105 -5/97      DIP +7/62 +4/71      RODRIGUEZ 257 251        Thumb Opposition: to all tips  Palmar Abduction: WFL  Radial Abduction: WFL    Wrist Ext/Flex: 65/70        Manual Muscle Test:      Strength: (SURINDER Dynamometer in lbs.) Average 3 trials,  Position II  Right: 38#  Left: 33.9#    Pinch Strength: (Pinch Gauge in psi's), Average 3 trials  3pt Pinch   R) 8.0 psi's  L) 8.3 psi's      CMS Impairment/Limitation/Restriction for FOTO hand Survey    Therapist reviewed FOTO scores for Mercedes Perez on 10/3/2022.   FOTO documents entered into [employer] - see Media section.    Limitation Score: 28%         Treatment     Treatment Time In: 12:55 pm  Treatment Time Out: 1:10 pm  Total Treatment time separate from Evaluation time:15    Mercedes received therapeutic exercises for 15 minutes including:  -performance of HEP-MP flexion, PIP isolated, joint blocking of PIP joints and abduction/adduction isolated ring and long finger  -general joint protection principles    Home Exercise Program/Education:  Issued HEP (see patient instructions in EMR) and educated on modality use for pain management . Exercises were reviewed and Mercedes was able to demonstrate them prior to the end of the session.   Pt received a written copy of exercises to perform at home. Mercedes demonstrated good  understanding of the education provided.  Pt was advised to perform these exercises free of pain, and to stop performing them if pain occurs.    Patient/Family Education: role of OT, goals for OT, scheduling/cancellations - pt verbalized understanding. Discussed insurance limitations with patient.    Additional Education provided: avoid over stressing hands with work at home.    Assessment     Mercedes Perez is a 62 y.o. 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 Following medical record review it is determined that pt will benefit from occupational therapy services in order to maximize pain free and/or functional use of right hand. The following goals were discussed with the patient and patient is in agreement with them  as to be addressed in the treatment plan. The patient's rehab potential is Fair.     Anticipated barriers to occupational therapy: an undiagnosed issue  Pt has no cultural, educational or language barriers to learning provided.    Profile and History Assessment of Occupational Performance Level of Clinical Decision Making Complexity Score   Occupational Profile:   Mercedes Perez is a 62 y.o. female who lives with their family and is not currently working. Mercedes Perez has difficulty with  Mild discomfort with right hand use at times  affecting his/her daily functional abilities. His/her main goal for therapy is for my hand to look better and no pain.     Comorbidities:   None noted at this time    Medical and Therapy History Review:   Brief               Performance Deficits    Physical:  Pinch Strength  Pain    Cognitive:  No Deficits    Psychosocial:    No Deficits     Clinical Decision Making:  low    Assessment Process:  Problem-Focused Assessments    Modification/Need for Assistance:  Not Necessary    Intervention Selection:  Limited Treatment Options       low  Based on PMHX, co morbidities , data from assessments and functional level of assistance required with task and clinical presentation directly impacting function.       The following goals were discussed with the patient and patient is in agreement with them as to be addressed in the treatment plan.     Goals:   1)  Patient to be IND with HEP and modalities for pain management  2)  Patient will reduce deformity of right ring and long finger degrees to increase functional hand use for ADLs/work/leisure activities  3)  Increase  strength 5 lbs of dominan right hand for gardening   4)  Increase 3pt pinch 2 psis for  use of hand in kitchen  5)  Pt will express modifications of home tasks for joint protection of her hands   6) Pt will use orthotics provided properly      Plan   Certification Period/Plan of care expiration: 10/3/2022 to 11/14/22.    Outpatient  Occupational Therapy 1 times weekly for 6 weeks to include the following interventions: Paraffin, Fluidotherapy, Therapeutic exercises/activities., Strengthening, and orthotic.      ROSS Quinteros LOTR    I certify the need for these services furnished under this plan of treatment and while under my care    ____________________________________  Physician/Referring Practitioner    _______________  Date of Signature

## 2022-10-04 LAB
ANTI SM ANTIBODY: 0.09 RATIO (ref 0–0.99)
ANTI SM/RNP ANTIBODY: 0.08 RATIO (ref 0–0.99)
ANTI-SM INTERPRETATION: NEGATIVE
ANTI-SM/RNP INTERPRETATION: NEGATIVE
ANTI-SSA ANTIBODY: 0.06 RATIO (ref 0–0.99)
ANTI-SSA INTERPRETATION: NEGATIVE
ANTI-SSB ANTIBODY: 0.05 RATIO (ref 0–0.99)
ANTI-SSB INTERPRETATION: NEGATIVE
DSDNA AB SER-ACNC: NORMAL [IU]/ML

## 2022-10-12 ENCOUNTER — TELEPHONE (OUTPATIENT)
Dept: PRIMARY CARE CLINIC | Facility: CLINIC | Age: 62
End: 2022-10-12
Payer: COMMERCIAL

## 2022-10-12 DIAGNOSIS — M81.0 AGE-RELATED OSTEOPOROSIS WITHOUT CURRENT PATHOLOGICAL FRACTURE: Primary | ICD-10-CM

## 2022-10-12 DIAGNOSIS — Z78.0 POST-MENOPAUSAL: ICD-10-CM

## 2022-10-12 NOTE — TELEPHONE ENCOUNTER
----- Message from Bonita Springer sent at 10/12/2022  3:03 PM CDT -----  Contact: Pt [phone]  Pt called in regards to speaking with Dr Deng she has a question about a bone density test please advise

## 2022-10-12 NOTE — TELEPHONE ENCOUNTER
Spoke with pt, pt needs a new order put in for the Reclast infusion.     I believe a care plan has to be put in.     Dr. Duron showed us last time how you have to do it. If you dont remember I can ask her in the morning.

## 2022-10-13 RX ORDER — SODIUM CHLORIDE 0.9 % (FLUSH) 0.9 %
10 SYRINGE (ML) INJECTION
OUTPATIENT
Start: 2022-10-13

## 2022-10-13 RX ORDER — HEPARIN 100 UNIT/ML
500 SYRINGE INTRAVENOUS
Status: SHIPPED | OUTPATIENT
Start: 2022-10-13

## 2022-10-13 RX ORDER — ZOLEDRONIC ACID 5 MG/100ML
5 INJECTION, SOLUTION INTRAVENOUS
Status: DISCONTINUED | OUTPATIENT
Start: 2022-10-13 | End: 2023-05-25

## 2022-10-13 RX ORDER — ACETAMINOPHEN 500 MG
500 TABLET ORAL
Status: SHIPPED | OUTPATIENT
Start: 2022-10-13

## 2022-10-13 RX ORDER — ACETAMINOPHEN 500 MG
500 TABLET ORAL
OUTPATIENT
Start: 2022-10-13

## 2022-10-13 RX ORDER — ZOLEDRONIC ACID 5 MG/100ML
5 INJECTION, SOLUTION INTRAVENOUS
OUTPATIENT
Start: 2022-10-13

## 2022-10-13 RX ORDER — HEPARIN 100 UNIT/ML
500 SYRINGE INTRAVENOUS
OUTPATIENT
Start: 2022-10-13

## 2022-10-13 RX ORDER — SODIUM CHLORIDE 0.9 % (FLUSH) 0.9 %
10 SYRINGE (ML) INJECTION
Status: SHIPPED | OUTPATIENT
Start: 2022-10-13

## 2022-10-13 NOTE — TELEPHONE ENCOUNTER
I just put in the therapy plan for the Reclast order again, please verify with the patient that she is okay with doing Reclast, at her most recent visit she told me she did not want to take it again because she had flu like symptoms for 4 days after the infusion.

## 2022-10-13 NOTE — TELEPHONE ENCOUNTER
Spoke with pt re: reclast infusion.   Pt stated that she will do the reclast infusion.  Explained to the pt that they will give her a call to schedule, please be on the look out for a call from them.  Pt verbalized undersrtanding

## 2022-11-01 ENCOUNTER — TELEPHONE (OUTPATIENT)
Dept: PHYSICAL MEDICINE AND REHAB | Facility: CLINIC | Age: 62
End: 2022-11-01
Payer: COMMERCIAL

## 2022-11-03 ENCOUNTER — TELEPHONE (OUTPATIENT)
Dept: ORTHOPEDICS | Facility: CLINIC | Age: 62
End: 2022-11-03
Payer: COMMERCIAL

## 2022-11-03 ENCOUNTER — PROCEDURE VISIT (OUTPATIENT)
Dept: NEUROLOGY | Facility: CLINIC | Age: 62
End: 2022-11-03
Payer: COMMERCIAL

## 2022-11-03 DIAGNOSIS — R52 PAIN: ICD-10-CM

## 2022-11-03 PROCEDURE — 95885 MUSC TST DONE W/NERV TST LIM: CPT | Mod: 59,S$GLB,, | Performed by: PHYSICAL MEDICINE & REHABILITATION

## 2022-11-03 PROCEDURE — 95886 PR EMG COMPLETE, W/ NERVE CONDUCTION STUDIES, 5+ MUSCLES: ICD-10-PCS | Mod: S$GLB,,, | Performed by: PHYSICAL MEDICINE & REHABILITATION

## 2022-11-03 PROCEDURE — 95911 PR NERVE CONDUCTION STUDY; 9-10 STUDIES: ICD-10-PCS | Mod: S$GLB,,, | Performed by: PHYSICAL MEDICINE & REHABILITATION

## 2022-11-03 PROCEDURE — 95911 NRV CNDJ TEST 9-10 STUDIES: CPT | Mod: S$GLB,,, | Performed by: PHYSICAL MEDICINE & REHABILITATION

## 2022-11-03 PROCEDURE — 95886 MUSC TEST DONE W/N TEST COMP: CPT | Mod: S$GLB,,, | Performed by: PHYSICAL MEDICINE & REHABILITATION

## 2022-11-03 PROCEDURE — 95885 PR MUSC TST DONE W/NERV TST LIM: ICD-10-PCS | Mod: 59,S$GLB,, | Performed by: PHYSICAL MEDICINE & REHABILITATION

## 2022-11-03 NOTE — PROCEDURES
Test Date:  11/3/2022    Patient: mercedes perez : 1960 Physician: Adelfo Ramírez D.O.   ID#: [id number] SEX: Male Ref. Phys: Julee Ordaz PA-C     HPI: Mercedes Perez is a 62 y.o.female who presents for NCS/EMG to evaluate CTS bilaterally.  Pt with n/t of the left hand.  She does have neck pain and radiation to the left shoulder.      NCV & EMG Findings:  Evaluation of the right median sensory nerve showed prolonged distal peak latency and decreased conduction velocity.  The left Median-Radial (Dig I) sensory nerve showed abnormal peak latency difference ((Median Wrist-Dig I)-(Radial Wrist-Dig I)).  The left Median-Ulnar Palmar sensory nerve showed abnormal peak latency difference ((Median Palm-Wrist)-(Ulnar Palm-Wrist)).  All remaining nerves (as indicated in the following tables) were within normal limits.  Needle evaluation of the left Triceps Brachii, the left Pronator Teres, and the left Flexor Carpi Radialis muscles showed increased motor unit amplitude and diminished recruitment.  All remaining muscles (as indicated in the following table) showed no evidence of electrical instability.    Impression:  There is electrophysiologic evidence of a bilateral sensory median mononeuropathy across the wrist (I.e. Carpal tunnel syndrome).  There is no motor axonal loss.  There is no active denervation.  This is graded as Borderline/very mild in severity on the left, and Mild on the right.      There is evidence of a chronic left C6 and/or C7 radiculopathy without active denervation.      ___________________________  Adelfo Ramírez D.O.        NCS+  Motor Nerve Results      Latency Amplitude F-Lat Segment Distance CV Comment   Site (ms) Norm (mV) Norm (ms)  (cm) (m/s) Norm    Left Median (APB)   Wrist 4.3  < 4.7 5.6  > 3.8  Wrist-Palm - - -    Elbow 7.7 - 5.3 -  Elbow-Wrist 19 56  > 47    Right Median (APB)   Wrist 4.2  < 4.7 8.8  > 3.8  Wrist-Palm - - -    Elbow 7.8 - 8.6 -  Elbow-Wrist 19 53  > 47    Left Ulnar  (ADM)   Wrist 3.0  < 3.7 9.4  > 3.0         Bel Elbow 5.7 - 8.3 -  Bel Elbow-Wrist 20 74  > 52    Abv Elbow 7.0 - 8.1 -  Abv Elbow-Bel Elbow 9 69  > 43    Right Ulnar (ADM)   Wrist 3.2  < 3.7 8.1  > 3.0         Bel Elbow 6.2 - 8.6 -  Bel Elbow-Wrist 18 60  > 52    Abv Elbow 7.4 - 8.1 -  Abv Elbow-Bel Elbow 8 67  > 43      Sensory Nerve Results      Latency (Peak) Amplitude (P-P) Segment Distance CV Comment   Site (ms) Norm (µV) Norm  (cm) (m/s) Norm    Left Median   Wrist-Dig I 3.3 - 34 - Wrist-Dig I 10 30 -    Wrist-Dig II 3.8  < 4.0 89  > 15 Wrist-Dig II 15 39  > 39    Palm-Wrist 2.4 - 50 - Palm-Wrist - - -    Right Median   Wrist-Dig II *4.2  < 4.0 43  > 15 Wrist-Dig II 14 *33  > 39    Left Ulnar   Palm-Wrist 2.1 - 43 - Palm-Wrist - - -    Right Ulnar   Wrist-Dig V 3.2  < 4.0 105  > 13 Wrist-Dig V 14 44  > 38    Left Radial   Wrist-Dig I 2.8 - 24 - Wrist-Dig I 10 36 -    Right Radial   Forearm-Wrist 1.78  < 2.8 37  > 11 Forearm-Wrist 10 56 -      Inter-Nerve Comparisons     Nerve 1 Value 1 Nerve 2 Value 2 Parameter Result Normal   Sensory Sites   L Median Wrist-Dig I 3.3 ms L Radial Wrist-Dig I 2.8 ms Peak Lat Diff *0.50 ms <0.40   L Median Palm-Wrist 2.4 ms L Ulnar Palm-Wrist 2.1 ms Peak Lat Diff *0.30 ms <0.30     EMG+     Side Muscle Nerve Root Ins Act Fibs Psw Amp Dur Poly Recrt Int Pat Comment   Left Deltoid Axillary C5-C6 Nml Nml Nml Nml Nml 0 Nml Nml    Left Triceps Radial C6-C8 Nml Nml Nml *Incr Nml 0 *Reduced Nml    Left Pronator Teres Median C6-C7 Nml Nml Nml *Incr Nml 0 *Reduced Nml    Left FCR Median C6-C7 Nml Nml Nml *Incr Nml 0 *Reduced Nml    Left APB Median C8-T1 Nml Nml Nml Nml Nml 0 Nml Nml    Right APB Median C8-T1 Nml Nml Nml Nml Nml 0 Nml Nml    Left Cervical Parasp (Lower) Rami C7-C8 Nml Nml Nml                 Waveforms:    Motor              Sensory

## 2022-11-07 LAB
HLA B27 INTERPRETATION: NORMAL
HLA-B27 RELATED AG QL: NEGATIVE
HLA-B27 RELATED AG QL: NORMAL

## 2022-11-08 ENCOUNTER — OFFICE VISIT (OUTPATIENT)
Dept: ORTHOPEDICS | Facility: CLINIC | Age: 62
End: 2022-11-08
Payer: COMMERCIAL

## 2022-11-08 VITALS
WEIGHT: 96 LBS | SYSTOLIC BLOOD PRESSURE: 118 MMHG | DIASTOLIC BLOOD PRESSURE: 79 MMHG | BODY MASS INDEX: 20.15 KG/M2 | HEART RATE: 77 BPM | HEIGHT: 58 IN

## 2022-11-08 DIAGNOSIS — R20.0 NUMBNESS AND TINGLING OF LEFT HAND: Primary | ICD-10-CM

## 2022-11-08 DIAGNOSIS — R20.2 NUMBNESS AND TINGLING OF LEFT HAND: Primary | ICD-10-CM

## 2022-11-08 PROCEDURE — 3008F PR BODY MASS INDEX (BMI) DOCUMENTED: ICD-10-PCS | Mod: CPTII,S$GLB,, | Performed by: ORTHOPAEDIC SURGERY

## 2022-11-08 PROCEDURE — 99214 OFFICE O/P EST MOD 30 MIN: CPT | Mod: S$GLB,,, | Performed by: ORTHOPAEDIC SURGERY

## 2022-11-08 PROCEDURE — 1159F MED LIST DOCD IN RCRD: CPT | Mod: CPTII,S$GLB,, | Performed by: ORTHOPAEDIC SURGERY

## 2022-11-08 PROCEDURE — 3074F PR MOST RECENT SYSTOLIC BLOOD PRESSURE < 130 MM HG: ICD-10-PCS | Mod: CPTII,S$GLB,, | Performed by: ORTHOPAEDIC SURGERY

## 2022-11-08 PROCEDURE — 3008F BODY MASS INDEX DOCD: CPT | Mod: CPTII,S$GLB,, | Performed by: ORTHOPAEDIC SURGERY

## 2022-11-08 PROCEDURE — 3074F SYST BP LT 130 MM HG: CPT | Mod: CPTII,S$GLB,, | Performed by: ORTHOPAEDIC SURGERY

## 2022-11-08 PROCEDURE — 99999 PR PBB SHADOW E&M-EST. PATIENT-LVL III: CPT | Mod: PBBFAC,,, | Performed by: ORTHOPAEDIC SURGERY

## 2022-11-08 PROCEDURE — 3078F PR MOST RECENT DIASTOLIC BLOOD PRESSURE < 80 MM HG: ICD-10-PCS | Mod: CPTII,S$GLB,, | Performed by: ORTHOPAEDIC SURGERY

## 2022-11-08 PROCEDURE — 3044F PR MOST RECENT HEMOGLOBIN A1C LEVEL <7.0%: ICD-10-PCS | Mod: CPTII,S$GLB,, | Performed by: ORTHOPAEDIC SURGERY

## 2022-11-08 PROCEDURE — 99999 PR PBB SHADOW E&M-EST. PATIENT-LVL III: ICD-10-PCS | Mod: PBBFAC,,, | Performed by: ORTHOPAEDIC SURGERY

## 2022-11-08 PROCEDURE — 99214 PR OFFICE/OUTPT VISIT, EST, LEVL IV, 30-39 MIN: ICD-10-PCS | Mod: S$GLB,,, | Performed by: ORTHOPAEDIC SURGERY

## 2022-11-08 PROCEDURE — 3044F HG A1C LEVEL LT 7.0%: CPT | Mod: CPTII,S$GLB,, | Performed by: ORTHOPAEDIC SURGERY

## 2022-11-08 PROCEDURE — 3078F DIAST BP <80 MM HG: CPT | Mod: CPTII,S$GLB,, | Performed by: ORTHOPAEDIC SURGERY

## 2022-11-08 PROCEDURE — 1159F PR MEDICATION LIST DOCUMENTED IN MEDICAL RECORD: ICD-10-PCS | Mod: CPTII,S$GLB,, | Performed by: ORTHOPAEDIC SURGERY

## 2022-11-08 NOTE — PROGRESS NOTES
rtSubjective:      Patient ID: Mercedes Perez is a 62 y.o. female.    Chief Complaint: Pain of the Left Hand and Pain of the Right Hand      HPI  Mercedes Perez is a 62 y.o. female presenting today for evaluation of the bilateral hands. She reports numbness in the left index, onset 2 years ago. Numbness is constant in the left index and intermittent in the left long. She does have occasional neck pain. On the right hand she reports extension lags at the long and small PIPs this is chronic. She has ulnar drift of fingers, denies prior dx of RA.    Interval history 11/08/2022: Patient returns for follow-up of EMG. Reports mild numbness right index finger. Not using braces currently.     Review of patient's allergies indicates:   Allergen Reactions    No known drug allergies          Current Outpatient Medications   Medication Sig Dispense Refill    calcium carbonate-vitamin D3 1,000 mg(2,500 mg)-800 unit Tab Take by mouth once daily.        Current Facility-Administered Medications   Medication Dose Route Frequency Provider Last Rate Last Admin    acetaminophen tablet 500 mg  500 mg Oral PRN Natasha Deng MD        heparin, porcine (PF) 100 unit/mL injection flush 500 Units  500 Units Intravenous PRN Natasha Deng MD        sodium chloride 0.9% flush 10 mL  10 mL Intravenous PRN Natasha Deng MD        zoledronic acid-mannitol & water 5 mg/100 mL infusion 5 mg  5 mg Intravenous 1 time in Clinic/HOD Natasha Deng MD           Past Medical History:   Diagnosis Date    Allergy     Cataract     UI (urinary incontinence)     Uterine fibroid        Past Surgical History:   Procedure Laterality Date    COLONOSCOPY N/A 11/1/2021    Procedure: COLONOSCOPY;  Surgeon: Donnell Carson MD;  Location: [facility];  Service: Endoscopy;  Laterality: N/A;  covid test 10/1 [place], instructions mailed-Kpvt  Pt requests Monday AM cases  covid test 10/29/21, prep instr mailed and emailed -ml       Review of Systems:  Constitutional: Negative for  "chills and fever.   Respiratory: Negative for cough and shortness of breath.    Gastrointestinal: Negative for nausea and vomiting.   Skin: Negative for rash.   Neurological: Negative for dizziness and headaches.   Psychiatric/Behavioral: Negative for depression.   MSK as in HPI       OBJECTIVE:     PHYSICAL EXAM:  /79   Pulse 77   Ht 4' 10" (1.473 m)   Wt 43.5 kg (96 lb)   BMI 20.06 kg/m²     GEN:  NAD, well-developed, well-groomed.  NEURO: Awake, alert, and oriented. Normal attention and concentration.    PSYCH: Normal mood and affect. Behavior is normal.  HEENT: No cervical lymphadenopathy noted.  CARDIOVASCULAR: Radial pulses 2+ bilaterally. No LE edema noted.  PULMONARY: Breath sounds normal. No respiratory distress.  SKIN: Intact, no rashes.      MSK:   BUE:  Good active ROM of the wrist and fingers. AIN/PIN/Radial/Median/Ulnar Nerves assessed in isolation without deficit. Radial & Ulnar arteries palpated 2+. Capillary Refill <3s.      RADIOGRAPHS:  Xray bl hands 9/12/22   FINDINGS:  Right hand: Osseous mineralization is preserved.  No acute displaced fractures.  No suspicious lytic or blastic lesions.  No osseous erosions.  There is mild asymmetric cartilage space narrowing at the 2nd DIP and 3rd PIP joints with associated bone productive changes and mild radial/ulnar subluxation, likely degenerative in nature.  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  No dislocation.  No significant soft tissue swelling.  No soft tissue calcifications or radiopaque foreign bodies.     Left hand: Osseous mineralization is preserved.  No acute displaced fractures.  Well corticated bone fragment at the dorsal aspect of the 5th middle phalanx base, presumably related to remote trauma.  No suspicious lytic or blastic lesions.  Mild 1st CMC cartilage space narrowing with associated bone productive changes, likely degenerative in " nature.  Remaining cartilage spaces are preserved.  No subluxation or dislocation.  No significant soft tissue swelling.  No soft tissue calcifications or radiopaque foreign bodies.     Impression:     As above.  Comments: I have personally reviewed the imaging and I agree with the above radiologist's report.    ASSESSMENT/PLAN:     No diagnosis found.           Plan:   Patient not interested in any intervention at this point  Recommend bracing and encouraged patient not to neglect this pathology  Braces provided in clinic   Will return urgently if symptoms worsen.   To see rheumatology January of 2023.

## 2022-11-08 NOTE — PROGRESS NOTES
I have personally taken the history and examined this patient. I agree with the resident's note as stated above.   Patient is here for nerve testing results she has mild carpal tunnel however she states those symptoms are resolved she does not feel like she needs any further treatment for her carpal tunnel on note she does have boutonniere deformity of her small finger significant swelling with deformity of her long finger PIP joint she also has ulnar drift of the MCP joints on the left-hand at last visit labs were ordered and rheumatology referral patient is to see rheumatology in January she is ADELAIDA positive the other labs are normal do feel that she needs further follow-up based on the deformities were seen develop in her hands but at this time no for current treatment for the carpal tunnel cover bracing if her symptoms return we would like to see her back urgently

## 2022-11-09 ENCOUNTER — DOCUMENTATION ONLY (OUTPATIENT)
Dept: REHABILITATION | Facility: HOSPITAL | Age: 62
End: 2022-11-09
Payer: COMMERCIAL

## 2022-11-09 DIAGNOSIS — R29.898 DECREASED PINCH STRENGTH: ICD-10-CM

## 2022-11-09 DIAGNOSIS — M79.644 FINGER PAIN, RIGHT: Primary | ICD-10-CM

## 2022-11-09 NOTE — PROGRESS NOTES
[facility]  Occupation Therapy Discharge Note    Name: [facility]  Clinic Number: [id number]    Therapy Diagnosis:   Encounter Diagnoses   Name Primary?    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

## 2023-01-24 ENCOUNTER — OFFICE VISIT (OUTPATIENT)
Dept: RHEUMATOLOGY | Facility: CLINIC | Age: 63
End: 2023-01-24
Payer: COMMERCIAL

## 2023-01-24 ENCOUNTER — LAB VISIT (OUTPATIENT)
Dept: LAB | Facility: HOSPITAL | Age: 63
End: 2023-01-24
Attending: INTERNAL MEDICINE
Payer: COMMERCIAL

## 2023-01-24 VITALS
SYSTOLIC BLOOD PRESSURE: 126 MMHG | WEIGHT: 99.38 LBS | OXYGEN SATURATION: 99 % | HEIGHT: 58 IN | BODY MASS INDEX: 20.86 KG/M2 | RESPIRATION RATE: 20 BRPM | DIASTOLIC BLOOD PRESSURE: 81 MMHG | HEART RATE: 66 BPM

## 2023-01-24 DIAGNOSIS — M15.9 PRIMARY OSTEOARTHRITIS INVOLVING MULTIPLE JOINTS: ICD-10-CM

## 2023-01-24 DIAGNOSIS — Z71.89 COUNSELING AND COORDINATION OF CARE: ICD-10-CM

## 2023-01-24 DIAGNOSIS — G56.00 CARPAL TUNNEL SYNDROME, UNSPECIFIED LATERALITY: ICD-10-CM

## 2023-01-24 DIAGNOSIS — R76.8 POSITIVE ANA (ANTINUCLEAR ANTIBODY): ICD-10-CM

## 2023-01-24 DIAGNOSIS — R76.8 POSITIVE ANA (ANTINUCLEAR ANTIBODY): Primary | ICD-10-CM

## 2023-01-24 LAB
25(OH)D3+25(OH)D2 SERPL-MCNC: 48 NG/ML (ref 30–96)
ALBUMIN SERPL BCP-MCNC: 4.4 G/DL (ref 3.5–5.2)
ALP SERPL-CCNC: 69 U/L (ref 55–135)
ALT SERPL W/O P-5'-P-CCNC: 15 U/L (ref 10–44)
ANION GAP SERPL CALC-SCNC: 7 MMOL/L (ref 8–16)
AST SERPL-CCNC: 20 U/L (ref 10–40)
BASOPHILS # BLD AUTO: 0.03 K/UL (ref 0–0.2)
BASOPHILS NFR BLD: 0.7 % (ref 0–1.9)
BILIRUB SERPL-MCNC: 0.6 MG/DL (ref 0.1–1)
BUN SERPL-MCNC: 11 MG/DL (ref 8–23)
C3 SERPL-MCNC: 101 MG/DL (ref 50–180)
C4 SERPL-MCNC: 22 MG/DL (ref 11–44)
CALCIUM SERPL-MCNC: 9.6 MG/DL (ref 8.7–10.5)
CHLORIDE SERPL-SCNC: 101 MMOL/L (ref 95–110)
CO2 SERPL-SCNC: 26 MMOL/L (ref 23–29)
CREAT SERPL-MCNC: 0.7 MG/DL (ref 0.5–1.4)
CRP SERPL-MCNC: 6 MG/L (ref 0–8.2)
DIFFERENTIAL METHOD: ABNORMAL
EOSINOPHIL # BLD AUTO: 0.1 K/UL (ref 0–0.5)
EOSINOPHIL NFR BLD: 3 % (ref 0–8)
ERYTHROCYTE [DISTWIDTH] IN BLOOD BY AUTOMATED COUNT: 12.6 % (ref 11.5–14.5)
ERYTHROCYTE [SEDIMENTATION RATE] IN BLOOD BY PHOTOMETRIC METHOD: 13 MM/HR (ref 0–36)
EST. GFR  (NO RACE VARIABLE): >60 ML/MIN/1.73 M^2
GLUCOSE SERPL-MCNC: 108 MG/DL (ref 70–110)
HBV SURFACE AB SER-ACNC: 270.67 MIU/ML
HBV SURFACE AB SER-ACNC: REACTIVE M[IU]/ML
HBV SURFACE AG SERPL QL IA: NORMAL
HCT VFR BLD AUTO: 45.4 % (ref 37–48.5)
HCV AB SERPL QL IA: NORMAL
HGB BLD-MCNC: 14.2 G/DL (ref 12–16)
HIV 1+2 AB+HIV1 P24 AG SERPL QL IA: NORMAL
IGA SERPL-MCNC: 465 MG/DL (ref 40–350)
IGG SERPL-MCNC: 1390 MG/DL (ref 650–1600)
IGM SERPL-MCNC: 171 MG/DL (ref 50–300)
IMM GRANULOCYTES # BLD AUTO: 0.01 K/UL (ref 0–0.04)
IMM GRANULOCYTES NFR BLD AUTO: 0.2 % (ref 0–0.5)
LYMPHOCYTES # BLD AUTO: 2.1 K/UL (ref 1–4.8)
LYMPHOCYTES NFR BLD: 45 % (ref 18–48)
MCH RBC QN AUTO: 30.3 PG (ref 27–31)
MCHC RBC AUTO-ENTMCNC: 31.3 G/DL (ref 32–36)
MCV RBC AUTO: 97 FL (ref 82–98)
MONOCYTES # BLD AUTO: 0.2 K/UL (ref 0.3–1)
MONOCYTES NFR BLD: 5 % (ref 4–15)
NEUTROPHILS # BLD AUTO: 2.1 K/UL (ref 1.8–7.7)
NEUTROPHILS NFR BLD: 46.1 % (ref 38–73)
NRBC BLD-RTO: 0 /100 WBC
PLATELET # BLD AUTO: 301 K/UL (ref 150–450)
PMV BLD AUTO: 9.6 FL (ref 9.2–12.9)
POTASSIUM SERPL-SCNC: 3.8 MMOL/L (ref 3.5–5.1)
PROT SERPL-MCNC: 8.4 G/DL (ref 6–8.4)
RBC # BLD AUTO: 4.68 M/UL (ref 4–5.4)
SODIUM SERPL-SCNC: 134 MMOL/L (ref 136–145)
THYROPEROXIDASE IGG SERPL-ACNC: <6 IU/ML
URATE SERPL-MCNC: 4.1 MG/DL (ref 2.4–5.7)
WBC # BLD AUTO: 4.6 K/UL (ref 3.9–12.7)

## 2023-01-24 PROCEDURE — 86334 IMMUNOFIX E-PHORESIS SERUM: CPT | Performed by: INTERNAL MEDICINE

## 2023-01-24 PROCEDURE — 84550 ASSAY OF BLOOD/URIC ACID: CPT | Performed by: INTERNAL MEDICINE

## 2023-01-24 PROCEDURE — 86160 COMPLEMENT ANTIGEN: CPT | Mod: 59 | Performed by: INTERNAL MEDICINE

## 2023-01-24 PROCEDURE — 86160 COMPLEMENT ANTIGEN: CPT | Performed by: INTERNAL MEDICINE

## 2023-01-24 PROCEDURE — 84165 PROTEIN E-PHORESIS SERUM: CPT | Performed by: INTERNAL MEDICINE

## 2023-01-24 PROCEDURE — 99999 PR PBB SHADOW E&M-EST. PATIENT-LVL III: ICD-10-PCS | Mod: PBBFAC,,, | Performed by: INTERNAL MEDICINE

## 2023-01-24 PROCEDURE — 85652 RBC SED RATE AUTOMATED: CPT | Performed by: INTERNAL MEDICINE

## 2023-01-24 PROCEDURE — 1159F PR MEDICATION LIST DOCUMENTED IN MEDICAL RECORD: ICD-10-PCS | Mod: CPTII,S$GLB,, | Performed by: INTERNAL MEDICINE

## 2023-01-24 PROCEDURE — 3074F PR MOST RECENT SYSTOLIC BLOOD PRESSURE < 130 MM HG: ICD-10-PCS | Mod: CPTII,S$GLB,, | Performed by: INTERNAL MEDICINE

## 2023-01-24 PROCEDURE — 85613 RUSSELL VIPER VENOM DILUTED: CPT | Performed by: INTERNAL MEDICINE

## 2023-01-24 PROCEDURE — 86147 CARDIOLIPIN ANTIBODY EA IG: CPT | Mod: 59 | Performed by: INTERNAL MEDICINE

## 2023-01-24 PROCEDURE — 86146 BETA-2 GLYCOPROTEIN ANTIBODY: CPT | Performed by: INTERNAL MEDICINE

## 2023-01-24 PROCEDURE — 1159F MED LIST DOCD IN RCRD: CPT | Mod: CPTII,S$GLB,, | Performed by: INTERNAL MEDICINE

## 2023-01-24 PROCEDURE — 3008F PR BODY MASS INDEX (BMI) DOCUMENTED: ICD-10-PCS | Mod: CPTII,S$GLB,, | Performed by: INTERNAL MEDICINE

## 2023-01-24 PROCEDURE — 86334 IMMUNOFIX E-PHORESIS SERUM: CPT | Mod: 26,,, | Performed by: PATHOLOGY

## 2023-01-24 PROCEDURE — 85730 THROMBOPLASTIN TIME PARTIAL: CPT | Performed by: INTERNAL MEDICINE

## 2023-01-24 PROCEDURE — 83516 IMMUNOASSAY NONANTIBODY: CPT | Performed by: INTERNAL MEDICINE

## 2023-01-24 PROCEDURE — 99204 PR OFFICE/OUTPT VISIT, NEW, LEVL IV, 45-59 MIN: ICD-10-PCS | Mod: S$GLB,,, | Performed by: INTERNAL MEDICINE

## 2023-01-24 PROCEDURE — 3079F DIAST BP 80-89 MM HG: CPT | Mod: CPTII,S$GLB,, | Performed by: INTERNAL MEDICINE

## 2023-01-24 PROCEDURE — 82306 VITAMIN D 25 HYDROXY: CPT | Performed by: INTERNAL MEDICINE

## 2023-01-24 PROCEDURE — 84165 PROTEIN E-PHORESIS SERUM: CPT | Mod: 26,,, | Performed by: PATHOLOGY

## 2023-01-24 PROCEDURE — 82784 ASSAY IGA/IGD/IGG/IGM EACH: CPT | Mod: 59 | Performed by: INTERNAL MEDICINE

## 2023-01-24 PROCEDURE — 99204 OFFICE O/P NEW MOD 45 MIN: CPT | Mod: S$GLB,,, | Performed by: INTERNAL MEDICINE

## 2023-01-24 PROCEDURE — 80053 COMPREHEN METABOLIC PANEL: CPT | Performed by: INTERNAL MEDICINE

## 2023-01-24 PROCEDURE — 3079F PR MOST RECENT DIASTOLIC BLOOD PRESSURE 80-89 MM HG: ICD-10-PCS | Mod: CPTII,S$GLB,, | Performed by: INTERNAL MEDICINE

## 2023-01-24 PROCEDURE — 83529 ASAY OF INTERLEUKIN-6 (IL-6): CPT | Performed by: INTERNAL MEDICINE

## 2023-01-24 PROCEDURE — 86376 MICROSOMAL ANTIBODY EACH: CPT | Performed by: INTERNAL MEDICINE

## 2023-01-24 PROCEDURE — 87340 HEPATITIS B SURFACE AG IA: CPT | Performed by: INTERNAL MEDICINE

## 2023-01-24 PROCEDURE — 99999 PR PBB SHADOW E&M-EST. PATIENT-LVL III: CPT | Mod: PBBFAC,,, | Performed by: INTERNAL MEDICINE

## 2023-01-24 PROCEDURE — 87389 HIV-1 AG W/HIV-1&-2 AB AG IA: CPT | Performed by: INTERNAL MEDICINE

## 2023-01-24 PROCEDURE — 86140 C-REACTIVE PROTEIN: CPT | Performed by: INTERNAL MEDICINE

## 2023-01-24 PROCEDURE — 86803 HEPATITIS C AB TEST: CPT | Performed by: INTERNAL MEDICINE

## 2023-01-24 PROCEDURE — 84165 PATHOLOGIST INTERPRETATION SPE: ICD-10-PCS | Mod: 26,,, | Performed by: PATHOLOGY

## 2023-01-24 PROCEDURE — 86706 HEP B SURFACE ANTIBODY: CPT | Performed by: INTERNAL MEDICINE

## 2023-01-24 PROCEDURE — 3008F BODY MASS INDEX DOCD: CPT | Mod: CPTII,S$GLB,, | Performed by: INTERNAL MEDICINE

## 2023-01-24 PROCEDURE — 85025 COMPLETE CBC W/AUTO DIFF WBC: CPT | Performed by: INTERNAL MEDICINE

## 2023-01-24 PROCEDURE — 3074F SYST BP LT 130 MM HG: CPT | Mod: CPTII,S$GLB,, | Performed by: INTERNAL MEDICINE

## 2023-01-24 PROCEDURE — 86334 PATHOLOGIST INTERPRETATION IFE: ICD-10-PCS | Mod: 26,,, | Performed by: PATHOLOGY

## 2023-01-24 PROCEDURE — 82787 IGG 1 2 3 OR 4 EACH: CPT | Mod: 59 | Performed by: INTERNAL MEDICINE

## 2023-01-24 NOTE — PROGRESS NOTES
RHEUMATOLOGY OUTPATIENT CLINIC NOTE    1/24/2023    Attending Rheumatologist: Curtis Young  Primary Care Provider: Natasha Deng MD   Physician Requesting Consultation: Julee Ordaz PA-C  [address] [place] [address]  Chief Complaint/Reason For Consultation:  No chief complaint on file.      Subjective:       HPI  Mercedes Perez is a 62 y.o.  female with medical history noted below who presents for evaluation of joint pain and +ADELAIDA.     Patient presents for evaluation of joint pain and +ADELAIDA. Patient had ADELAIDA checked in the setting of joint pain. Notes she has been having joint pain in her hands for abot 2 years, endorses paraesthesias of her left 2/3rd fingers as well. She notes pain in her PIPs especially if she works a lot. Rest helps. Has not tried OTC meds. She notes since she stopped working pain has improved. No morning stiffness. No FHX of arthritis. +Hair thinning, Dry eyes, easy bruising, miscarriage x1 (1st trimester, 2 healthy kids), fatigue, HA. No Alopecia, Oral/Nasal Ulcers, Rash, Photosensitivity, Dry Mouth, Pleuritis/serositis, LAD, Raynaud's, Blood clots, GERD, Skin tightening, SOB, chest pain, fever, wt loss, constipation/diarrhea.       Review of Systems   Constitutional:  Positive for fatigue. Negative for chills, fever and unexpected weight change.   HENT:  Negative for mouth sores.    Eyes:  Positive for eye dryness. Negative for redness.   Respiratory:  Negative for cough and shortness of breath.    Cardiovascular:  Negative for chest pain.   Gastrointestinal:  Negative for abdominal distention, constipation, diarrhea, nausea and vomiting.   Genitourinary:  Negative for vaginal dryness.   Musculoskeletal:  Positive for arthralgias. Negative for back pain, gait problem, joint swelling, leg pain, myalgias, neck pain, neck stiffness and joint deformity.   Integumentary:  Negative for rash.   Neurological:  Positive for numbness and headaches. Negative for weakness.    Hematological:  Negative for adenopathy. Bruises/bleeds easily.   Psychiatric/Behavioral:  Negative for confusion, decreased concentration and sleep disturbance. The patient is not nervous/anxious.    All other systems reviewed and are negative.     Chronic comorbid conditions affecting medical decision making today:  Past Medical History:   Diagnosis Date    Allergy     Cataract     UI (urinary incontinence)     Uterine fibroid      Past Surgical History:   Procedure Laterality Date    COLONOSCOPY N/A 11/1/2021    Procedure: COLONOSCOPY;  Surgeon: Donnell Carson MD;  Location: [facility];  Service: Endoscopy;  Laterality: N/A;  covid test 10/1 [facility], instructions mailed-Kpvt  Pt requests Monday AM cases  covid test 10/29/21, prep instr mailed and emailed -ml     Family History   Problem Relation Age of Onset    Hypertension Mother     Mitral valve prolapse Mother     Diabetes Maternal Grandmother     Coronary artery disease Paternal Aunt     Kidney disease Father     Hypertension Father     No Known Problems Sister     No Known Problems Brother     No Known Problems Maternal Aunt     No Known Problems Maternal Uncle     No Known Problems Paternal Uncle     No Known Problems Maternal Grandfather     No Known Problems Paternal Grandmother     No Known Problems Paternal Grandfather     Amblyopia Neg Hx     Blindness Neg Hx     Cancer Neg Hx     Cataracts Neg Hx     Glaucoma Neg Hx     Macular degeneration Neg Hx     Retinal detachment Neg Hx     Strabismus Neg Hx     Stroke Neg Hx     Thyroid disease Neg Hx     Melanoma Neg Hx     Breast cancer Neg Hx     Ovarian cancer Neg Hx     Cervical cancer Neg Hx     Endometrial cancer Neg Hx     Vaginal cancer Neg Hx      Social History     Substance and Sexual Activity   Alcohol Use No     Social History     Tobacco Use   Smoking Status Never   Smokeless Tobacco Never     Social History     Substance and Sexual Activity   Drug Use No       Current Outpatient  Medications:     calcium carbonate-vitamin D3 1,000 mg(2,500 mg)-800 unit Tab, Take by mouth once daily. , Disp: , Rfl:     Current Facility-Administered Medications:     acetaminophen tablet 500 mg, 500 mg, Oral, PRN, Natasha Deng MD    heparin, porcine (PF) 100 unit/mL injection flush 500 Units, 500 Units, Intravenous, PRN, Natasha Deng MD    sodium chloride 0.9% flush 10 mL, 10 mL, Intravenous, PRN, Natasha Deng MD    zoledronic acid-mannitol & water 5 mg/100 mL infusion 5 mg, 5 mg, Intravenous, 1 time in Clinic/HOD, Natasha Deng MD     Objective:         Vitals:    01/24/23 0806   BP: 126/81   Pulse: 66   Resp: 20     Physical Exam  Can make fist, no synovitis  Right MCPs with mild ulnar drift and boutonierre deformity 3/5th finger  Knee crepitus  Negative ankle/MTP  No tender points     Reviewed old and all outside pertinent medical records available.    All lab results personally reviewed and interpreted by me.  Lab Results   Component Value Date    WBC 5.31 09/30/2022    HGB 14.5 09/30/2022    HCT 42.3 09/30/2022    MCV 94 09/30/2022    MCH 32.1 (H) 09/30/2022    MCHC 34.3 09/30/2022    RDW 12.6 09/30/2022     09/30/2022    MPV 8.7 (L) 09/30/2022       Lab Results   Component Value Date     09/12/2022    K 4.0 09/12/2022     09/12/2022    CO2 25 09/12/2022     (H) 09/12/2022    BUN 13 09/12/2022    CALCIUM 8.8 09/12/2022    PROT 7.2 09/12/2022    ALBUMIN 4.2 09/12/2022    BILITOT 0.7 09/12/2022    AST 18 09/12/2022    ALKPHOS 51 (L) 09/12/2022    ALT 14 09/12/2022       Lab Results   Component Value Date    COLORU Yellow 06/18/2020    APPEARANCEUA Cloudy (A) 06/18/2020    SPECGRAV 1.015 06/18/2020    PHUR 7.0 06/18/2020    PROTEINUA Negative 06/18/2020    KETONESU Negative 06/18/2020    LEUKOCYTESUR 1+ (A) 06/18/2020    NITRITE Negative 06/18/2020    UROBILINOGEN Negative 05/14/2018       Lab Results   Component Value Date    CRP 0.9 09/30/2022       Lab Results   Component  Value Date    SEDRATE 11 09/30/2022       Lab Results   Component Value Date    RF <13.0 09/30/2022    SEDRATE 11 09/30/2022       No components found for: 25OHVITDTOT, 98CXXUPN9, 18OAGWEY4, METHODNOTE    No results found for: URICACID    No components found for: TSPOTTB        Imaging:  All imaging reviewed and independently interpreted by me.         ASSESSMENT / PLAN:     Mercedes Perez is a 62 y.o.  female with:      1. Positive ADELAIDA (antinuclear antibody)  - ADELAIDA 1:160, Homogenous, Profile Negative   - discussed results  - endorses arthralgias, dry eyes, and Hx of miscarriages, fatigue  - her joint pain is interesting as she reports improvement since she left working going against history of inflammatory and more mechanical, yet has mild ulnar drift of MCP on right and boutonierre deformities   - she does not like to take medication and would like to defer at this time   - we will get blood work as below and monitor, if any change then revisit at later date  - reassurance   - C-Reactive Protein; Future  - CBC Auto Differential; Future  - Comprehensive Metabolic Panel; Future  - Anti-Histone Antibody; Future  - Sedimentation rate; Future  - Hepatitis B Surface Ab, Qualitative; Future  - Vitamin D; Future  - Uric Acid; Future  - Hepatitis C Antibody; Future  - Hepatitis B Surface Antigen; Future  - Quantiferon Gold TB; Future  - HIV 1/2 Ag/Ab (4th Gen); Future  - IgG 1, 2, 3, and 4; Future  - Immunoglobulins (IgG, IgA, IgM) Quantitative; Future  - Interleukin-6; Future  - Protein Electrophoresis, Serum; Future  - Immunofixation Electrophoresis; Future  - THYROID PEROXIDASE ANTIBODY; Future  - Protein/Creatinine Ratio, Urine; Future  - C4 Complement; Future  - C3 Complement; Future  - Urinalysis; Future  - DRVVT; Future  - Cardiolipin antibody; Future  - Beta-2 Glycoprotein Abs (IgA, IgG, IgM); Future    2. Primary osteoarthritis involving multiple joints  - joint pain 2/2 OA  - discussed diagnosis and management  -  Tylenol PRN  - reassurance and exercise     3. Carpal tunnel syndrome, unspecified laterality  - EMG with +CTS  - did not want therapy when she saw Ortho  - wrist splints qhs  - reassurance     4. Other specified counseling  - over 10 minutes spent regarding below topics:  - Immunization counseling done.  - Weight loss counseling done.  - Nutrition and exercise counseling.  - Limitation of alcohol consumption.  - Regular exercise:  Aerobic and resistance.  - Medication counseling provided.      Follow up in about 6 months (around 7/24/2023).    Method of contact with patient concerns: [facility]    Disclaimer:  This note is prepared using voice recognition software and as such is likely to have errors and has not been proof read. Please contact me for questions.     Time spent: 45 minutes in face to face discussion concerning diagnosis, prognosis, review of lab and test results, benefits of treatment as well as management of disease, counseling of patient and coordination of care between various health care providers.  Greater than half the time spent was used for coordination of care and counseling of patient.    Curtis Young M.D.  Rheumatology Department   [facility]

## 2023-01-25 LAB
ALBUMIN SERPL ELPH-MCNC: 4.78 G/DL (ref 3.35–5.55)
ALPHA1 GLOB SERPL ELPH-MCNC: 0.29 G/DL (ref 0.17–0.41)
ALPHA2 GLOB SERPL ELPH-MCNC: 0.7 G/DL (ref 0.43–0.99)
B-GLOBULIN SERPL ELPH-MCNC: 0.99 G/DL (ref 0.5–1.1)
GAMMA GLOB SERPL ELPH-MCNC: 1.44 G/DL (ref 0.67–1.58)
INTERPRETATION SERPL IFE-IMP: NORMAL
PATHOLOGIST INTERPRETATION IFE: NORMAL
PATHOLOGIST INTERPRETATION SPE: NORMAL
PROT SERPL-MCNC: 8.2 G/DL (ref 6–8.4)

## 2023-01-27 LAB
CARDIOLIPIN IGG SER IA-ACNC: <9.4 GPL (ref 0–14.99)
CARDIOLIPIN IGM SER IA-ACNC: 15.7 MPL (ref 0–12.49)
IGG1 SER-MCNC: 844 MG/DL (ref 382–929)
IGG2 SER-MCNC: 261 MG/DL (ref 242–700)
IGG3 SER-MCNC: 105 MG/DL (ref 22–176)
IGG4 SER-MCNC: 97 MG/DL (ref 4–86)

## 2023-01-28 LAB — HISTONE IGG SER IA-ACNC: 0.4 UNITS (ref 0–0.9)

## 2023-01-29 LAB
B2 GLYCOPROT1 IGA SER QL: <9 SAU
B2 GLYCOPROT1 IGG SER QL: <9 SGU
B2 GLYCOPROT1 IGM SER QL: <9 SMU

## 2023-02-02 LAB
APTT IMM NP PPP: NORMAL SEC (ref 32–48)
APTT P HEP NEUT PPP: NORMAL SEC (ref 32–48)
CONFIRM APTT STACLOT: NORMAL
DRVVT SCREEN TO CONFIRM RATIO: NORMAL RATIO
LA 3 SCREEN W REFLEX-IMP: NORMAL
LA NT DPL PPP QL: NORMAL
MIXING DRVVT: NORMAL SEC (ref 33–44)
PROTHROMBIN TIME: 13.1 SEC (ref 12–15.5)
REPTILASE TIME: NORMAL SEC
SCREEN APTT: 47 SEC (ref 32–48)
SCREEN DRVVT: 33 SEC (ref 33–44)
THROMBIN TIME: NORMAL SEC (ref 14.7–19.5)

## 2023-02-20 LAB — MAYO MISCELLANEOUS RESULT (REF): 2 PG/ML

## 2023-03-02 ENCOUNTER — OFFICE VISIT (OUTPATIENT)
Dept: OBSTETRICS AND GYNECOLOGY | Facility: CLINIC | Age: 63
End: 2023-03-02
Payer: COMMERCIAL

## 2023-03-02 VITALS — DIASTOLIC BLOOD PRESSURE: 78 MMHG | BODY MASS INDEX: 20.32 KG/M2 | SYSTOLIC BLOOD PRESSURE: 120 MMHG | WEIGHT: 97.25 LBS

## 2023-03-02 DIAGNOSIS — N81.10 BADEN-WALKER GRADE 2 CYSTOCELE: ICD-10-CM

## 2023-03-02 DIAGNOSIS — Z01.419 WELL WOMAN EXAM WITH ROUTINE GYNECOLOGICAL EXAM: Primary | ICD-10-CM

## 2023-03-02 PROCEDURE — 99396 PREV VISIT EST AGE 40-64: CPT | Mod: S$GLB,,, | Performed by: OBSTETRICS & GYNECOLOGY

## 2023-03-02 PROCEDURE — 3008F PR BODY MASS INDEX (BMI) DOCUMENTED: ICD-10-PCS | Mod: CPTII,S$GLB,, | Performed by: OBSTETRICS & GYNECOLOGY

## 2023-03-02 PROCEDURE — 3008F BODY MASS INDEX DOCD: CPT | Mod: CPTII,S$GLB,, | Performed by: OBSTETRICS & GYNECOLOGY

## 2023-03-02 PROCEDURE — 3078F DIAST BP <80 MM HG: CPT | Mod: CPTII,S$GLB,, | Performed by: OBSTETRICS & GYNECOLOGY

## 2023-03-02 PROCEDURE — 99396 PR PREVENTIVE VISIT,EST,40-64: ICD-10-PCS | Mod: S$GLB,,, | Performed by: OBSTETRICS & GYNECOLOGY

## 2023-03-02 PROCEDURE — 3074F SYST BP LT 130 MM HG: CPT | Mod: CPTII,S$GLB,, | Performed by: OBSTETRICS & GYNECOLOGY

## 2023-03-02 PROCEDURE — 3074F PR MOST RECENT SYSTOLIC BLOOD PRESSURE < 130 MM HG: ICD-10-PCS | Mod: CPTII,S$GLB,, | Performed by: OBSTETRICS & GYNECOLOGY

## 2023-03-02 PROCEDURE — 99999 PR PBB SHADOW E&M-EST. PATIENT-LVL II: ICD-10-PCS | Mod: PBBFAC,,, | Performed by: OBSTETRICS & GYNECOLOGY

## 2023-03-02 PROCEDURE — 3078F PR MOST RECENT DIASTOLIC BLOOD PRESSURE < 80 MM HG: ICD-10-PCS | Mod: CPTII,S$GLB,, | Performed by: OBSTETRICS & GYNECOLOGY

## 2023-03-02 PROCEDURE — 1159F PR MEDICATION LIST DOCUMENTED IN MEDICAL RECORD: ICD-10-PCS | Mod: CPTII,S$GLB,, | Performed by: OBSTETRICS & GYNECOLOGY

## 2023-03-02 PROCEDURE — 99999 PR PBB SHADOW E&M-EST. PATIENT-LVL II: CPT | Mod: PBBFAC,,, | Performed by: OBSTETRICS & GYNECOLOGY

## 2023-03-02 PROCEDURE — 1159F MED LIST DOCD IN RCRD: CPT | Mod: CPTII,S$GLB,, | Performed by: OBSTETRICS & GYNECOLOGY

## 2023-03-02 NOTE — PROGRESS NOTES
SUBJECTIVE:   Mercedes Perez is a 63 y.o. female   for annual well woman exam. No LMP recorded. Patient is postmenopausal..  She     Menopause @ 50 age, denies HRT, denies VB    She was fitted for ring support with knob size 3 in , for cystocle/apical prolapse    Prior to pessary, pt has had incomplete bladder emptying symptoms  She then developed CASSANDRA with pessary and sometimes overflow incontinence  She has taken a break from pessary for the past month and all symptoms are improving  Sometimes noted pressure/discomfort in the vagina lasting for 1-2 days then resolved.  Denies abdominal pain    Past Medical History:   Diagnosis Date    Allergy     Cataract     UI (urinary incontinence)     Uterine fibroid      Past Surgical History:   Procedure Laterality Date    COLONOSCOPY N/A 2021    Procedure: COLONOSCOPY;  Surgeon: Donnell Carson MD;  Location: [facility];  Service: Endoscopy;  Laterality: N/A;  covid test 10/1 [facility], instructions mailed-Kpvt  Pt requests Monday AM cases  covid test 10/29/21, prep instr mailed and emailed -ml     Social History     Socioeconomic History    Marital status:    Tobacco Use    Smoking status: Never    Smokeless tobacco: Never   Substance and Sexual Activity    Alcohol use: No    Drug use: No    Sexual activity: Not Currently     Birth control/protection: Post-menopausal     Family History   Problem Relation Age of Onset    Hypertension Mother     Mitral valve prolapse Mother     Diabetes Maternal Grandmother     Coronary artery disease Paternal Aunt     Kidney disease Father     Hypertension Father     No Known Problems Sister     No Known Problems Brother     No Known Problems Maternal Aunt     No Known Problems Maternal Uncle     No Known Problems Paternal Uncle     No Known Problems Maternal Grandfather     No Known Problems Paternal Grandmother     No Known Problems Paternal Grandfather     Amblyopia Neg Hx     Blindness Neg Hx     Cancer Neg Hx      Cataracts Neg Hx     Glaucoma Neg Hx     Macular degeneration Neg Hx     Retinal detachment Neg Hx     Strabismus Neg Hx     Stroke Neg Hx     Thyroid disease Neg Hx     Melanoma Neg Hx     Breast cancer Neg Hx     Ovarian cancer Neg Hx     Cervical cancer Neg Hx     Endometrial cancer Neg Hx     Vaginal cancer Neg Hx      OB History    Para Term  AB Living   3 0 0   1 2   SAB IAB Ectopic Multiple Live Births   1       2      # Outcome Date GA Lbr Zachary/2nd Weight Sex Delivery Anes PTL Lv   3 SAB            2     3.629 kg (8 lb)  Vag-Spont      1     3.685 kg (8 lb 2 oz)  Vag-Spont         Obstetric Comments   Menopause @ age 50, denies HRT   Denies abnl pap , pap/hpv NEG 2021   dexa  - osteoporosis         Current Outpatient Medications   Medication Sig Dispense Refill    calcium carbonate-vitamin D3 1,000 mg(2,500 mg)-800 unit Tab Take by mouth once daily.        Current Facility-Administered Medications   Medication Dose Route Frequency Provider Last Rate Last Admin    acetaminophen tablet 500 mg  500 mg Oral PRN Natasha Deng MD        heparin, porcine (PF) 100 unit/mL injection flush 500 Units  500 Units Intravenous PRN Natasha Deng MD        sodium chloride 0.9% flush 10 mL  10 mL Intravenous PRN Natasha Deng MD        zoledronic acid-mannitol & water 5 mg/100 mL infusion 5 mg  5 mg Intravenous 1 time in Clinic/HOD Natasha Deng MD         Allergies: No known drug allergies       ROS:  GENERAL: Denies weight gain or weight loss. Feeling well overall.   SKIN: Denies rash or lesions.   HEAD: Denies head injury or headache.   NODES: Denies enlarged lymph nodes.   CHEST: Denies chest pain or shortness of breath.   CARDIOVASCULAR: Denies palpitations or left sided chest pain.   ABDOMEN: No abdominal pain, constipation, diarrhea, nausea, vomiting or rectal bleeding.   URINARY: No frequency, dysuria, hematuria, or burning on urination.  REPRODUCTIVE: Denies vaginal discharge,  abnormal vaginal bleeding, lesions, pelvic pain  BREASTS: The patient performs breast self-examination and denies pain, lumps, or nipple discharge.   HEMATOLOGIC: No easy bruisability or excessive bleeding.  MUSCULOSKELETAL: Denies joint pain or swelling.   NEUROLOGIC: Denies syncope or weakness.   PSYCHIATRIC: Denies depression, anxiety or mood swings.      OBJECTIVE:   /78   Wt 44.1 kg (97 lb 3.6 oz)   BMI 20.32 kg/m²   The patient appears well, alert, oriented x 3, in no distress.  PSYCH:  Normal, full range of affect  NECK: negative, no thyromegaly, trachea midline  SKIN: normal, good color, good turgor and no acne, striae, hirsutism  BREAST EXAM: breasts appear normal, no suspicious masses, no skin or nipple changes or axillary nodes  ABDOMEN: soft, non-tender; bowel sounds normal; no masses,  no organomegaly and no hernias, masses, or hepatosplenomegaly  GENITALIA: normal external genitalia, no erythema, no discharge  BLADDER: soft  URETHRA: normal appearing urethra with no masses, tenderness or lesions and normal urethra, normal urethral meatus  VAGINA: cystocele stage 2, apical prolapse stage 1  CERVIX: no lesions or cervical motion tenderness  UTERUS: prolapsed first degree  ADNEXA: no mass, fullness, tenderness      ASSESSMENT:       1. Health maintenance  -pap UTD, next pap 2024  -screening MMG  UTD - neg 10/2022  -counseled on exercise and healthy diet  -bone health:  Discussed Vitamin D and Calcium supplementation, weight bearing exercises, osteoporosis on Reclast  2.  Discussed safety at home/school/work, healthy and balanced diet, sleep hygiene, as well as violence/weapons exposure.   3.  Symptomatic POP;  continue pessary, if symptoms worsen - consider surgical intervention

## 2023-03-27 ENCOUNTER — CLINICAL SUPPORT (OUTPATIENT)
Dept: AUDIOLOGY | Facility: CLINIC | Age: 63
End: 2023-03-27
Payer: COMMERCIAL

## 2023-03-27 ENCOUNTER — OFFICE VISIT (OUTPATIENT)
Dept: OTOLARYNGOLOGY | Facility: CLINIC | Age: 63
End: 2023-03-27
Payer: COMMERCIAL

## 2023-03-27 VITALS — WEIGHT: 98.13 LBS | BODY MASS INDEX: 20.5 KG/M2

## 2023-03-27 DIAGNOSIS — H92.02 OTALGIA OF LEFT EAR: Primary | ICD-10-CM

## 2023-03-27 DIAGNOSIS — M26.622 ARTHRALGIA OF LEFT TEMPOROMANDIBULAR JOINT: ICD-10-CM

## 2023-03-27 PROCEDURE — 92567 PR TYMPA2METRY: ICD-10-PCS | Mod: S$GLB,,, | Performed by: AUDIOLOGIST

## 2023-03-27 PROCEDURE — 99999 PR PBB SHADOW E&M-EST. PATIENT-LVL I: CPT | Mod: PBBFAC,,,

## 2023-03-27 PROCEDURE — 92557 COMPREHENSIVE HEARING TEST: CPT | Mod: S$GLB,,, | Performed by: AUDIOLOGIST

## 2023-03-27 PROCEDURE — 99999 PR PBB SHADOW E&M-EST. PATIENT-LVL II: CPT | Mod: PBBFAC,,, | Performed by: OTOLARYNGOLOGY

## 2023-03-27 PROCEDURE — 99214 OFFICE O/P EST MOD 30 MIN: CPT | Mod: S$GLB,,, | Performed by: OTOLARYNGOLOGY

## 2023-03-27 PROCEDURE — 99214 PR OFFICE/OUTPT VISIT, EST, LEVL IV, 30-39 MIN: ICD-10-PCS | Mod: S$GLB,,, | Performed by: OTOLARYNGOLOGY

## 2023-03-27 PROCEDURE — 3008F PR BODY MASS INDEX (BMI) DOCUMENTED: ICD-10-PCS | Mod: CPTII,S$GLB,, | Performed by: OTOLARYNGOLOGY

## 2023-03-27 PROCEDURE — 99999 PR PBB SHADOW E&M-EST. PATIENT-LVL II: ICD-10-PCS | Mod: PBBFAC,,, | Performed by: OTOLARYNGOLOGY

## 2023-03-27 PROCEDURE — 92567 TYMPANOMETRY: CPT | Mod: S$GLB,,, | Performed by: AUDIOLOGIST

## 2023-03-27 PROCEDURE — 1159F MED LIST DOCD IN RCRD: CPT | Mod: CPTII,S$GLB,, | Performed by: OTOLARYNGOLOGY

## 2023-03-27 PROCEDURE — 3008F BODY MASS INDEX DOCD: CPT | Mod: CPTII,S$GLB,, | Performed by: OTOLARYNGOLOGY

## 2023-03-27 PROCEDURE — 1159F PR MEDICATION LIST DOCUMENTED IN MEDICAL RECORD: ICD-10-PCS | Mod: CPTII,S$GLB,, | Performed by: OTOLARYNGOLOGY

## 2023-03-27 PROCEDURE — 92557 PR COMPREHENSIVE HEARING TEST: ICD-10-PCS | Mod: S$GLB,,, | Performed by: AUDIOLOGIST

## 2023-03-27 PROCEDURE — 99999 PR PBB SHADOW E&M-EST. PATIENT-LVL I: ICD-10-PCS | Mod: PBBFAC,,,

## 2023-03-27 NOTE — PROGRESS NOTES
Subjective:       Patient ID: Mercedes Perez is a 63 y.o. female.    Chief Complaint: Otalgia    HPI  Reports persistent dull achey ear pain on left. Has seen her dentist who reported good dentition. She denies new stressors. She denies grinding her teeth. Reports the pain is around her jaw joint. Denies otorrhea, vertigo, headaches and history of ear surgery.    Review of Systems   Constitutional:  Negative for activity change and appetite change.   HENT:  Negative for dental problem and drooling.    Eyes:  Negative for discharge and itching.   Respiratory:  Negative for apnea and chest tightness.    Cardiovascular:  Negative for chest pain and claudication.   Gastrointestinal:  Negative for abdominal distention and abdominal pain.   Endocrine: Negative for cold intolerance and heat intolerance.   Genitourinary:  Negative for bladder incontinence, difficulty urinating, dyspareunia, genital sores and menstrual irregularity.   Musculoskeletal:  Negative for arthralgias and back pain.   Integumentary:  Negative for breast mass and breast discharge.   Allergic/Immunologic: Negative for environmental allergies and food allergies.   Neurological:  Negative for dizziness, coordination difficulties and coordination difficulties.   Hematological:  Negative for adenopathy. Does not bruise/bleed easily.   Psychiatric/Behavioral:  Negative for agitation and behavioral problems.    Breast: Negative for mass      Objective:      Physical Exam  Constitutional:       General: She is not in acute distress.     Appearance: Normal appearance. She is well-developed. She is not diaphoretic.   HENT:      Head: Normocephalic and atraumatic.      Right Ear: Tympanic membrane, ear canal and external ear normal. No decreased hearing noted. No laceration, drainage, swelling or tenderness. No middle ear effusion. There is no impacted cerumen. No foreign body. No mastoid tenderness. No hemotympanum. Tympanic membrane is not injected, scarred,  perforated, erythematous, retracted or bulging. Tympanic membrane has normal mobility.      Left Ear: Tympanic membrane, ear canal and external ear normal. No decreased hearing noted. No laceration, drainage, swelling or tenderness.  No middle ear effusion. There is no impacted cerumen. No foreign body. No mastoid tenderness. No hemotympanum. Tympanic membrane is not injected, scarred, perforated, erythematous, retracted or bulging. Tympanic membrane has normal mobility.      Ears:      Comments:     +++ L TMJ tenderness      Eyes:      Extraocular Movements:      Right eye: Normal extraocular motion and no nystagmus.      Left eye: Normal extraocular motion and no nystagmus.   Neurological:      Mental Status: She is alert.      Cranial Nerves: No cranial nerve deficit.      Sensory: No sensory deficit.      Coordination: Coordination normal.      Gait: Gait normal.             Assessment:       Problem List Items Addressed This Visit    None  Visit Diagnoses       Otalgia of left ear    -  Primary    Arthralgia of left temporomandibular joint                Plan:       - TMJ precautions provided to patient. Encouraged warm compresses and NSAID use TID  - RTC PRN    TMJ regimen with soft diet, NSAID's, Heating pad over joint for 20 minutes tid for 7-10 days. If no better consider re evaluation for use of muscle relaxants and or referral to Oral Surgery specialist.

## 2023-03-27 NOTE — PROGRESS NOTES
Ms. Mercedes Perez was seen in the clinic today for an audiological evaluation.  Ms. Perez reported otalgia of the left ear.    Audiological testing revealed normal hearing sloping to a mild high frequency sensorineural hearing loss for the right ear and essentially normal hearing, with a mild hearing loss noted at 8000 Hz, for the left ear.  A speech reception threshold was obtained at 0 dBHL for the right ear and at 0 dBHL for the left ear.  Speech discrimination was 96% for the right ear and 96% for the left ear.      Tympanometry testing revealed a Type A tympanogram for the right ear and a Type A tympanogram for the left ear.      Recommendations:  1. Otologic evaluation  2. Annual audiological evaluation  3. Hearing protection when in noise

## 2023-05-17 ENCOUNTER — TELEPHONE (OUTPATIENT)
Dept: PRIMARY CARE CLINIC | Facility: CLINIC | Age: 63
End: 2023-05-17
Payer: COMMERCIAL

## 2023-05-20 ENCOUNTER — PATIENT MESSAGE (OUTPATIENT)
Dept: PRIMARY CARE CLINIC | Facility: CLINIC | Age: 63
End: 2023-05-20
Payer: COMMERCIAL

## 2023-05-20 DIAGNOSIS — M81.0 AGE-RELATED OSTEOPOROSIS WITHOUT CURRENT PATHOLOGICAL FRACTURE: Primary | ICD-10-CM

## 2023-05-22 NOTE — TELEPHONE ENCOUNTER
Pt is agreeable to start prolia on boniva injections.     Please place order under therapy plan, let me know if you need help

## 2023-05-25 RX ORDER — IBANDRONATE SODIUM 150 MG/1
150 TABLET, FILM COATED ORAL
Qty: 1 TABLET | Refills: 11 | Status: SHIPPED | OUTPATIENT
Start: 2023-05-25 | End: 2023-09-13

## 2023-05-25 RX ORDER — IBANDRONATE SODIUM 150 MG/1
150 TABLET, FILM COATED ORAL
Qty: 1 TABLET | Refills: 11 | Status: SHIPPED | OUTPATIENT
Start: 2023-05-25 | End: 2023-05-25 | Stop reason: SDUPTHER

## 2023-05-25 NOTE — TELEPHONE ENCOUNTER
I sent in a prescription for Boniva which is a once monthly oral medication, she should take this with plenty of water about 1 hour before her 1st food drink or medication of the day, she should avoid lying down for 1 hour.  She should also be taking calcium of 1000 mg a day which includes dietary sources and vitamin-D 3 2000 IU daily.  She needs to make her dentist aware that she is taking this medication if she is in need of any procedures.

## 2023-08-09 ENCOUNTER — OFFICE VISIT (OUTPATIENT)
Dept: RHEUMATOLOGY | Facility: CLINIC | Age: 63
End: 2023-08-09
Payer: COMMERCIAL

## 2023-08-09 VITALS
HEIGHT: 58 IN | WEIGHT: 96.38 LBS | SYSTOLIC BLOOD PRESSURE: 127 MMHG | DIASTOLIC BLOOD PRESSURE: 79 MMHG | BODY MASS INDEX: 20.23 KG/M2 | OXYGEN SATURATION: 97 % | HEART RATE: 72 BPM

## 2023-08-09 DIAGNOSIS — G56.03 BILATERAL CARPAL TUNNEL SYNDROME: ICD-10-CM

## 2023-08-09 DIAGNOSIS — M15.9 PRIMARY OSTEOARTHRITIS INVOLVING MULTIPLE JOINTS: ICD-10-CM

## 2023-08-09 DIAGNOSIS — R76.8 POSITIVE ANA (ANTINUCLEAR ANTIBODY): Primary | ICD-10-CM

## 2023-08-09 DIAGNOSIS — Z71.89 COUNSELING AND COORDINATION OF CARE: ICD-10-CM

## 2023-08-09 PROCEDURE — 3074F PR MOST RECENT SYSTOLIC BLOOD PRESSURE < 130 MM HG: ICD-10-PCS | Mod: CPTII,S$GLB,, | Performed by: INTERNAL MEDICINE

## 2023-08-09 PROCEDURE — 99214 OFFICE O/P EST MOD 30 MIN: CPT | Mod: S$GLB,,, | Performed by: INTERNAL MEDICINE

## 2023-08-09 PROCEDURE — 3008F BODY MASS INDEX DOCD: CPT | Mod: CPTII,S$GLB,, | Performed by: INTERNAL MEDICINE

## 2023-08-09 PROCEDURE — 1159F PR MEDICATION LIST DOCUMENTED IN MEDICAL RECORD: ICD-10-PCS | Mod: CPTII,S$GLB,, | Performed by: INTERNAL MEDICINE

## 2023-08-09 PROCEDURE — 3074F SYST BP LT 130 MM HG: CPT | Mod: CPTII,S$GLB,, | Performed by: INTERNAL MEDICINE

## 2023-08-09 PROCEDURE — 3078F DIAST BP <80 MM HG: CPT | Mod: CPTII,S$GLB,, | Performed by: INTERNAL MEDICINE

## 2023-08-09 PROCEDURE — 3078F PR MOST RECENT DIASTOLIC BLOOD PRESSURE < 80 MM HG: ICD-10-PCS | Mod: CPTII,S$GLB,, | Performed by: INTERNAL MEDICINE

## 2023-08-09 PROCEDURE — 99214 PR OFFICE/OUTPT VISIT, EST, LEVL IV, 30-39 MIN: ICD-10-PCS | Mod: S$GLB,,, | Performed by: INTERNAL MEDICINE

## 2023-08-09 PROCEDURE — 99999 PR PBB SHADOW E&M-EST. PATIENT-LVL III: CPT | Mod: PBBFAC,,, | Performed by: INTERNAL MEDICINE

## 2023-08-09 PROCEDURE — 3008F PR BODY MASS INDEX (BMI) DOCUMENTED: ICD-10-PCS | Mod: CPTII,S$GLB,, | Performed by: INTERNAL MEDICINE

## 2023-08-09 PROCEDURE — 99999 PR PBB SHADOW E&M-EST. PATIENT-LVL III: ICD-10-PCS | Mod: PBBFAC,,, | Performed by: INTERNAL MEDICINE

## 2023-08-09 PROCEDURE — 1159F MED LIST DOCD IN RCRD: CPT | Mod: CPTII,S$GLB,, | Performed by: INTERNAL MEDICINE

## 2023-08-09 NOTE — PROGRESS NOTES
RHEUMATOLOGY OUTPATIENT CLINIC NOTE    8/9/2023    Attending Rheumatologist: Curtis Young  Primary Care Provider: Natasha Deng MD   Physician Requesting Consultation: No referring provider defined for this encounter.  Chief Complaint/Reason For Consultation:  abnormal blood      Subjective:       HPI  Mercedes Perez is a 63 y.o.  female with medical history noted below who presents for evaluation of joint pain and +ADELAIDA.   Patient presents for evaluation of joint pain and +ADELAIDA. Patient had ADELAIDA checked in the setting of joint pain. Notes she has been having joint pain in her hands for abot 2 years, endorses paraesthesias of her left 2/3rd fingers as well. She notes pain in her PIPs especially if she works a lot. Rest helps. Has not tried OTC meds. She notes since she stopped working pain has improved. No morning stiffness. No FHX of arthritis. +Hair thinning, Dry eyes, easy bruising, miscarriage x1 (1st trimester, 2 healthy kids), fatigue, HA. No Alopecia, Oral/Nasal Ulcers, Rash, Photosensitivity, Dry Mouth, Pleuritis/serositis, LAD, Raynaud's, Blood clots, GERD, Skin tightening, SOB, chest pain, fever, wt loss, constipation/diarrhea.     Today  Patient here for follow up.   Last visit presented for evaluation of +ADELAIDA and joint pain. Had labs. Notes she has been doing well, does have arthralgias of the hands usually if she overuses them.      Review of Systems   Constitutional:  Negative for chills, fatigue, fever and unexpected weight change.   HENT:  Negative for mouth sores.    Eyes:  Negative for redness and eye dryness.   Respiratory:  Negative for cough and shortness of breath.    Cardiovascular:  Negative for chest pain.   Gastrointestinal:  Negative for abdominal distention, constipation, diarrhea, nausea and vomiting.   Genitourinary:  Negative for vaginal dryness.   Musculoskeletal:  Positive for arthralgias. Negative for back pain, gait problem, joint swelling, leg pain, myalgias, neck  pain, neck stiffness and joint deformity.   Integumentary:  Negative for rash.   Neurological:  Negative for weakness, numbness and headaches.   Hematological:  Negative for adenopathy. Does not bruise/bleed easily.   Psychiatric/Behavioral:  Negative for confusion, decreased concentration and sleep disturbance. The patient is not nervous/anxious.    All other systems reviewed and are negative.       Chronic comorbid conditions affecting medical decision making today:  Past Medical History:   Diagnosis Date    Allergy     Cataract     UI (urinary incontinence)     Uterine fibroid      Past Surgical History:   Procedure Laterality Date    COLONOSCOPY N/A 11/1/2021    Procedure: COLONOSCOPY;  Surgeon: Donnell Carson MD;  Location: [facility] ENDO;  Service: Endoscopy;  Laterality: N/A;  covid test 10/1 [place], instructions mailed-Kpvt  Pt requests Monday AM cases  covid test 10/29/21, prep instr mailed and emailed -ml     Family History   Problem Relation Age of Onset    Hypertension Mother     Mitral valve prolapse Mother     Diabetes Maternal Grandmother     Coronary artery disease Paternal Aunt     Kidney disease Father     Hypertension Father     No Known Problems Sister     No Known Problems Brother     No Known Problems Maternal Aunt     No Known Problems Maternal Uncle     No Known Problems Paternal Uncle     No Known Problems Maternal Grandfather     No Known Problems Paternal Grandmother     No Known Problems Paternal Grandfather     Amblyopia Neg Hx     Blindness Neg Hx     Cancer Neg Hx     Cataracts Neg Hx     Glaucoma Neg Hx     Macular degeneration Neg Hx     Retinal detachment Neg Hx     Strabismus Neg Hx     Stroke Neg Hx     Thyroid disease Neg Hx     Melanoma Neg Hx     Breast cancer Neg Hx     Ovarian cancer Neg Hx     Cervical cancer Neg Hx     Endometrial cancer Neg Hx     Vaginal cancer Neg Hx      Social History     Substance and Sexual Activity   Alcohol Use No     Social History     Tobacco  Use   Smoking Status Never   Smokeless Tobacco Never     Social History     Substance and Sexual Activity   Drug Use No       Current Outpatient Medications:     calcium carbonate-vitamin D3 1,000 mg(2,500 mg)-800 unit Tab, Take by mouth once daily. , Disp: , Rfl:     ibandronate (BONIVA) 150 mg tablet, Take 1 tablet (150 mg total) by mouth every 30 days., Disp: 1 tablet, Rfl: 11    Current Facility-Administered Medications:     acetaminophen tablet 500 mg, 500 mg, Oral, PRN, Natasha Deng MD    heparin, porcine (PF) 100 unit/mL injection flush 500 Units, 500 Units, Intravenous, PRN, Natasha Deng MD    sodium chloride 0.9% flush 10 mL, 10 mL, Intravenous, Ish DOYLE Rebecca, MD     Objective:         Vitals:    08/09/23 1105   BP: 127/79   Pulse: 72     Physical Exam  Can make fist, no synovitis  Right MCPs with mild ulnar drift and boutonierre deformity 3/5th finger  Knee crepitus  Negative ankle/MTP  No tender points     Reviewed old and all outside pertinent medical records available.    All lab results personally reviewed and interpreted by me.  Lab Results   Component Value Date    WBC 4.60 01/24/2023    HGB 14.2 01/24/2023    HCT 45.4 01/24/2023    MCV 97 01/24/2023    MCH 30.3 01/24/2023    MCHC 31.3 (L) 01/24/2023    RDW 12.6 01/24/2023     01/24/2023    MPV 9.6 01/24/2023       Lab Results   Component Value Date     (L) 01/24/2023    K 3.8 01/24/2023     01/24/2023    CO2 26 01/24/2023     01/24/2023    BUN 11 01/24/2023    CALCIUM 9.6 01/24/2023    PROT 8.4 01/24/2023    ALBUMIN 4.4 01/24/2023    BILITOT 0.6 01/24/2023    AST 20 01/24/2023    ALKPHOS 69 01/24/2023    ALT 15 01/24/2023       Lab Results   Component Value Date    COLORU Yellow 01/24/2023    APPEARANCEUA Clear 01/24/2023    SPECGRAV 1.010 01/24/2023    PHUR 7.0 01/24/2023    PROTEINUA Negative 01/24/2023    KETONESU Negative 01/24/2023    LEUKOCYTESUR Negative 01/24/2023    NITRITE Negative 01/24/2023     "UROBILINOGEN Negative 05/14/2018       Lab Results   Component Value Date    CRP 6.0 01/24/2023       Lab Results   Component Value Date    SEDRATE 13 01/24/2023       Lab Results   Component Value Date    RF <13.0 09/30/2022    SEDRATE 13 01/24/2023       No components found for: "25OHVITDTOT", "36YKIGRT7", "72QAHALX0", "METHODNOTE"    Lab Results   Component Value Date    URICACID 4.1 01/24/2023       No components found for: "TSPOTTB"        Imaging:  All imaging reviewed and independently interpreted by me.         ASSESSMENT / PLAN:     Mercedes Perez is a 63 y.o.  female with:      1. Positive ADELAIDA (antinuclear antibody)  - ADELAIDA 1:160, Homogenous, Profile Negative   - continue to monitor   - reassurance     2. Primary osteoarthritis involving multiple joints  - joint pain 2/2 OA  - chronic   - Tylenol PRN  - reassurance and exercise     3. Carpal tunnel syndrome, unspecified laterality  - EMG with +CTS  - did not want therapy when she saw Ortho  - wrist splints qhs  - reassurance     4. Other specified counseling  - over 10 minutes spent regarding below topics:  - Immunization counseling done.  - Weight loss counseling done.  - Nutrition and exercise counseling.  - Limitation of alcohol consumption.  - Regular exercise:  Aerobic and resistance.  - Medication counseling provided.      Follow up in about 6 months (around 2/9/2024).    Method of contact with patient concerns: [facility]    Disclaimer:  This note is prepared using voice recognition software and as such is likely to have errors and has not been proof read. Please contact me for questions.     Time spent: 30 minutes in face to face discussion concerning diagnosis, prognosis, review of lab and test results, benefits of treatment as well as management of disease, counseling of patient and coordination of care between various health care providers.  Greater than half the time spent was used for coordination of care and counseling of " patient.    Curtis Young M.D.  Rheumatology Department   [facility]

## 2023-09-13 ENCOUNTER — OFFICE VISIT (OUTPATIENT)
Dept: PRIMARY CARE CLINIC | Facility: CLINIC | Age: 63
End: 2023-09-13
Payer: COMMERCIAL

## 2023-09-13 VITALS
HEIGHT: 58 IN | HEART RATE: 76 BPM | DIASTOLIC BLOOD PRESSURE: 74 MMHG | RESPIRATION RATE: 16 BRPM | BODY MASS INDEX: 20.32 KG/M2 | WEIGHT: 96.81 LBS | SYSTOLIC BLOOD PRESSURE: 114 MMHG | OXYGEN SATURATION: 97 %

## 2023-09-13 DIAGNOSIS — Z13.6 ENCOUNTER FOR LIPID SCREENING FOR CARDIOVASCULAR DISEASE: ICD-10-CM

## 2023-09-13 DIAGNOSIS — Z86.16 HISTORY OF COVID-19: ICD-10-CM

## 2023-09-13 DIAGNOSIS — Z96.0 PRESENCE OF PESSARY: ICD-10-CM

## 2023-09-13 DIAGNOSIS — Z13.220 ENCOUNTER FOR LIPID SCREENING FOR CARDIOVASCULAR DISEASE: ICD-10-CM

## 2023-09-13 DIAGNOSIS — Z00.00 ANNUAL PHYSICAL EXAM: Primary | ICD-10-CM

## 2023-09-13 DIAGNOSIS — Z12.31 SCREENING MAMMOGRAM FOR BREAST CANCER: ICD-10-CM

## 2023-09-13 DIAGNOSIS — M81.0 AGE-RELATED OSTEOPOROSIS WITHOUT CURRENT PATHOLOGICAL FRACTURE: ICD-10-CM

## 2023-09-13 DIAGNOSIS — N39.46 MIXED INCONTINENCE URGE AND STRESS (MALE)(FEMALE): ICD-10-CM

## 2023-09-13 DIAGNOSIS — R76.8 POSITIVE ANA (ANTINUCLEAR ANTIBODY): ICD-10-CM

## 2023-09-13 PROCEDURE — 99396 PR PREVENTIVE VISIT,EST,40-64: ICD-10-PCS | Mod: S$GLB,,, | Performed by: FAMILY MEDICINE

## 2023-09-13 PROCEDURE — 99999 PR PBB SHADOW E&M-EST. PATIENT-LVL IV: CPT | Mod: PBBFAC,,, | Performed by: FAMILY MEDICINE

## 2023-09-13 PROCEDURE — 99999 PR PBB SHADOW E&M-EST. PATIENT-LVL IV: ICD-10-PCS | Mod: PBBFAC,,, | Performed by: FAMILY MEDICINE

## 2023-09-13 PROCEDURE — 99396 PREV VISIT EST AGE 40-64: CPT | Mod: S$GLB,,, | Performed by: FAMILY MEDICINE

## 2023-09-13 RX ORDER — IBANDRONATE SODIUM 150 MG/1
150 TABLET, FILM COATED ORAL
Qty: 3 TABLET | Refills: 6 | Status: SHIPPED | OUTPATIENT
Start: 2023-09-13 | End: 2024-09-12

## 2023-09-13 NOTE — PROGRESS NOTES
"Subjective:       Patient ID: Mercedes Perez is a 63 y.o. female.    Chief Complaint: Annual Exam    HPI  62 y/o female with Osteoporosis, Mixed incontinence, Positive ADELAIDA, hx of Covid is here for her annual exam.      She feels that since she has been on Boniva she has been belching more and she has a hx of flatulence but its increased, she denies heartburn/indigestion/n/v/d/constipation/cp/sob/urinary sx. She is eating healthy. She is walking 30 min most days. She is not doing any resistance or weight training. Sleeping fair. 1-2 coffee daily.       Hx of Covid-19 11/2020 mild asymptomatic was tested because of exposure, 2021  Vestibular migraines: Dr. Jazmín lopez  Positive ADELAIDA: following with rheum  Mixed incontinence/cystocele: following with Urogyn, she tried ditropan but it was not helpful, topical estrogen cream not helpful, Pessary in place  Osteoporosis: she is taking 1200 mg of Calcium and 1000 IU D3, did not tolerate Reclast she had flu like sx,Dexa 10/2022, Boniva 150 mg tablet monthly  Eye exam due glaucoma suspect, Dr. Villalta  Dental utd  GYN: following with Dr. Boyle, pelvic utd, mmg 10/2022  Colonoscopy 11/2021 repeat 10 years     Review of Systems  see HPI  Objective:      /74 (BP Location: Left arm, Patient Position: Sitting, BP Method: Medium (Manual))   Pulse 76   Resp 16   Ht 4' 10" (1.473 m)   Wt 43.9 kg (96 lb 12.5 oz)   SpO2 97%   BMI 20.23 kg/m²   Physical Exam  Vitals and nursing note reviewed.   Constitutional:       Appearance: She is well-developed.   HENT:      Head: Normocephalic and atraumatic.      Mouth/Throat:      Pharynx: No oropharyngeal exudate or posterior oropharyngeal erythema.   Neck:      Thyroid: No thyromegaly.   Cardiovascular:      Rate and Rhythm: Normal rate and regular rhythm.      Heart sounds: Normal heart sounds.   Pulmonary:      Effort: Pulmonary effort is normal. No respiratory distress.      Breath sounds: Normal breath sounds.   Abdominal:      General: " Bowel sounds are normal. There is no distension.      Palpations: Abdomen is soft. There is no mass.      Tenderness: There is no abdominal tenderness.   Musculoskeletal:      Cervical back: Normal range of motion and neck supple.      Right lower leg: No edema.      Left lower leg: No edema.   Lymphadenopathy:      Cervical: No cervical adenopathy.   Skin:     General: Skin is warm and dry.   Neurological:      Mental Status: She is alert.         Assessment:       1. Annual physical exam    2. Screening mammogram for breast cancer    3. Positive ADELAIDA (antinuclear antibody)    4. Encounter for lipid screening for cardiovascular disease    5. Age-related osteoporosis without current pathological fracture    6. Mixed incontinence urge and stress (male)(female)    7. Presence of pessary    8. History of COVID-19        Plan:   Mercedes was seen today for annual exam.    Diagnoses and all orders for this visit:    Annual physical exam  -     CBC Auto Differential; Future  -     Comprehensive Metabolic Panel; Future  -     Hemoglobin A1C; Future  -     Lipid Panel; Future  -     TSH; Future    Screening mammogram for breast cancer  -     Mammo Digital Screening Bilat w/ Humberto; Future    Positive ADELAIDA (antinuclear antibody)    Encounter for lipid screening for cardiovascular disease  -     Lipid Panel; Future    Age-related osteoporosis without current pathological fracture  -     ibandronate (BONIVA) 150 mg tablet; Take 1 tablet (150 mg total) by mouth every 30 days.    Mixed incontinence urge and stress (male)(female)    Presence of pessary    History of COVID-19

## 2023-09-25 ENCOUNTER — LAB VISIT (OUTPATIENT)
Dept: LAB | Facility: HOSPITAL | Age: 63
End: 2023-09-25
Attending: FAMILY MEDICINE
Payer: COMMERCIAL

## 2023-09-25 DIAGNOSIS — Z13.220 ENCOUNTER FOR LIPID SCREENING FOR CARDIOVASCULAR DISEASE: ICD-10-CM

## 2023-09-25 DIAGNOSIS — Z13.6 ENCOUNTER FOR LIPID SCREENING FOR CARDIOVASCULAR DISEASE: ICD-10-CM

## 2023-09-25 DIAGNOSIS — Z00.00 ANNUAL PHYSICAL EXAM: ICD-10-CM

## 2023-09-25 LAB
ALBUMIN SERPL BCP-MCNC: 3.9 G/DL (ref 3.5–5.2)
ALP SERPL-CCNC: 49 U/L (ref 55–135)
ALT SERPL W/O P-5'-P-CCNC: 19 U/L (ref 10–44)
ANION GAP SERPL CALC-SCNC: 6 MMOL/L (ref 8–16)
AST SERPL-CCNC: 24 U/L (ref 10–40)
BASOPHILS # BLD AUTO: 0.04 K/UL (ref 0–0.2)
BASOPHILS NFR BLD: 0.9 % (ref 0–1.9)
BILIRUB SERPL-MCNC: 0.6 MG/DL (ref 0.1–1)
BUN SERPL-MCNC: 16 MG/DL (ref 8–23)
CALCIUM SERPL-MCNC: 9 MG/DL (ref 8.7–10.5)
CHLORIDE SERPL-SCNC: 107 MMOL/L (ref 95–110)
CHOLEST SERPL-MCNC: 191 MG/DL (ref 120–199)
CHOLEST/HDLC SERPL: 3.7 {RATIO} (ref 2–5)
CO2 SERPL-SCNC: 25 MMOL/L (ref 23–29)
CREAT SERPL-MCNC: 0.7 MG/DL (ref 0.5–1.4)
DIFFERENTIAL METHOD: ABNORMAL
EOSINOPHIL # BLD AUTO: 0.2 K/UL (ref 0–0.5)
EOSINOPHIL NFR BLD: 4.1 % (ref 0–8)
ERYTHROCYTE [DISTWIDTH] IN BLOOD BY AUTOMATED COUNT: 12.4 % (ref 11.5–14.5)
EST. GFR  (NO RACE VARIABLE): >60 ML/MIN/1.73 M^2
ESTIMATED AVG GLUCOSE: 114 MG/DL (ref 68–131)
GLUCOSE SERPL-MCNC: 107 MG/DL (ref 70–110)
HBA1C MFR BLD: 5.6 % (ref 4–5.6)
HCT VFR BLD AUTO: 42.7 % (ref 37–48.5)
HDLC SERPL-MCNC: 52 MG/DL (ref 40–75)
HDLC SERPL: 27.2 % (ref 20–50)
HGB BLD-MCNC: 13.9 G/DL (ref 12–16)
IMM GRANULOCYTES # BLD AUTO: 0.01 K/UL (ref 0–0.04)
IMM GRANULOCYTES NFR BLD AUTO: 0.2 % (ref 0–0.5)
LDLC SERPL CALC-MCNC: 115.8 MG/DL (ref 63–159)
LYMPHOCYTES # BLD AUTO: 2.3 K/UL (ref 1–4.8)
LYMPHOCYTES NFR BLD: 49.4 % (ref 18–48)
MCH RBC QN AUTO: 30.8 PG (ref 27–31)
MCHC RBC AUTO-ENTMCNC: 32.6 G/DL (ref 32–36)
MCV RBC AUTO: 95 FL (ref 82–98)
MONOCYTES # BLD AUTO: 0.2 K/UL (ref 0.3–1)
MONOCYTES NFR BLD: 5.1 % (ref 4–15)
NEUTROPHILS # BLD AUTO: 1.9 K/UL (ref 1.8–7.7)
NEUTROPHILS NFR BLD: 40.3 % (ref 38–73)
NONHDLC SERPL-MCNC: 139 MG/DL
NRBC BLD-RTO: 0 /100 WBC
PLATELET # BLD AUTO: 234 K/UL (ref 150–450)
PMV BLD AUTO: 9.1 FL (ref 9.2–12.9)
POTASSIUM SERPL-SCNC: 4.5 MMOL/L (ref 3.5–5.1)
PROT SERPL-MCNC: 7.4 G/DL (ref 6–8.4)
RBC # BLD AUTO: 4.51 M/UL (ref 4–5.4)
SODIUM SERPL-SCNC: 138 MMOL/L (ref 136–145)
TRIGL SERPL-MCNC: 116 MG/DL (ref 30–150)
TSH SERPL DL<=0.005 MIU/L-ACNC: 2.07 UIU/ML (ref 0.4–4)
WBC # BLD AUTO: 4.68 K/UL (ref 3.9–12.7)

## 2023-09-25 PROCEDURE — 84443 ASSAY THYROID STIM HORMONE: CPT | Performed by: FAMILY MEDICINE

## 2023-09-25 PROCEDURE — 83036 HEMOGLOBIN GLYCOSYLATED A1C: CPT | Performed by: FAMILY MEDICINE

## 2023-09-25 PROCEDURE — 36415 COLL VENOUS BLD VENIPUNCTURE: CPT | Mod: PO | Performed by: FAMILY MEDICINE

## 2023-09-25 PROCEDURE — 80053 COMPREHEN METABOLIC PANEL: CPT | Performed by: FAMILY MEDICINE

## 2023-09-25 PROCEDURE — 85025 COMPLETE CBC W/AUTO DIFF WBC: CPT | Performed by: FAMILY MEDICINE

## 2023-09-25 PROCEDURE — 80061 LIPID PANEL: CPT | Performed by: FAMILY MEDICINE

## 2023-12-11 ENCOUNTER — HOSPITAL ENCOUNTER (OUTPATIENT)
Dept: RADIOLOGY | Facility: HOSPITAL | Age: 63
Discharge: HOME | End: 2023-12-11
Attending: FAMILY MEDICINE
Payer: COMMERCIAL

## 2023-12-11 DIAGNOSIS — Z12.31 SCREENING MAMMOGRAM FOR BREAST CANCER: ICD-10-CM

## 2023-12-11 PROCEDURE — 77063 BREAST TOMOSYNTHESIS BI: CPT | Mod: 26,,, | Performed by: RADIOLOGY

## 2023-12-11 PROCEDURE — 77067 MAMMO DIGITAL SCREENING BILAT WITH TOMO: ICD-10-PCS | Mod: 26,,, | Performed by: RADIOLOGY

## 2023-12-11 PROCEDURE — 77067 SCR MAMMO BI INCL CAD: CPT | Mod: 26,,, | Performed by: RADIOLOGY

## 2023-12-11 PROCEDURE — 77063 MAMMO DIGITAL SCREENING BILAT WITH TOMO: ICD-10-PCS | Mod: 26,,, | Performed by: RADIOLOGY

## 2023-12-11 PROCEDURE — 77067 SCR MAMMO BI INCL CAD: CPT | Mod: TC,PO

## 2023-12-18 ENCOUNTER — OFFICE VISIT (OUTPATIENT)
Dept: OPTOMETRY | Facility: CLINIC | Age: 63
End: 2023-12-18
Payer: COMMERCIAL

## 2023-12-18 DIAGNOSIS — H52.4 HYPEROPIA WITH ASTIGMATISM AND PRESBYOPIA, BILATERAL: ICD-10-CM

## 2023-12-18 DIAGNOSIS — H04.123 DRY EYE SYNDROME OF BOTH EYES: Primary | ICD-10-CM

## 2023-12-18 DIAGNOSIS — H52.203 HYPEROPIA WITH ASTIGMATISM AND PRESBYOPIA, BILATERAL: ICD-10-CM

## 2023-12-18 DIAGNOSIS — H25.13 SENILE NUCLEAR SCLEROSIS, BILATERAL: ICD-10-CM

## 2023-12-18 DIAGNOSIS — R42 DIZZINESS: ICD-10-CM

## 2023-12-18 DIAGNOSIS — H40.013 OAG (OPEN ANGLE GLAUCOMA) SUSPECT, LOW RISK, BILATERAL: ICD-10-CM

## 2023-12-18 DIAGNOSIS — H52.03 HYPEROPIA WITH ASTIGMATISM AND PRESBYOPIA, BILATERAL: ICD-10-CM

## 2023-12-18 PROCEDURE — 92014 COMPRE OPH EXAM EST PT 1/>: CPT | Mod: S$GLB,,, | Performed by: OPTOMETRIST

## 2023-12-18 PROCEDURE — 92014 PR EYE EXAM, EST PATIENT,COMPREHESV: ICD-10-PCS | Mod: S$GLB,,, | Performed by: OPTOMETRIST

## 2023-12-18 PROCEDURE — 99999 PR PBB SHADOW E&M-EST. PATIENT-LVL II: CPT | Mod: PBBFAC,,, | Performed by: OPTOMETRIST

## 2023-12-18 PROCEDURE — 99999 PR PBB SHADOW E&M-EST. PATIENT-LVL II: ICD-10-PCS | Mod: PBBFAC,,, | Performed by: OPTOMETRIST

## 2023-12-18 NOTE — PROGRESS NOTES
HPI    TERESA: 07/22  Chief complaint (CC): patient is here for annual eye exam today.  Patient   has noticed more trouble seeing at a distance without glasses. Wears OTC   glasses for near, seems to work okay but some small print is still   difficult. Patient does not want a refraction today.  Eyes sometimes feel   tired at the end of the day.  Glasses? +2.50 OTC  Contacts? -  H/o eye surgery, injections or laser: -  H/o eye injury: -  Known eye conditions? See above  Family h/o eye conditions? -  Eye gtts? AT's QID OU      (-) Flashes (-)  Floaters (-) Mucous   (-)  Tearing (-) Itching (-) Burning   (-) Headaches (-) Eye Pain/discomfort (-) Irritation   (-)  Redness (-) Double vision (-) Blurry vision    Diabetic? -  A1c? -      Last edited by Amy Serrato on 12/18/2023  8:43 AM.            Assessment /Plan     For exam results, see Encounter Report.      Dry eye syndrome of both eyes  Recommend Systane Ultra or Refresh Optive BID-TID OU to aid with symptoms of dry eyes.    Senile nuclear sclerosis, bilateral  Nuclear sclerotic cataract - not visually significant. Observe.    Hyperopia with astigmatism and presbyopia, bilateral  Cont w/OTC readers    OAG (open angle glaucoma) suspect, low risk, bilateral  -     Posterior Segment OCT Optic Nerve- Both eyes; Future  -     [place] Visual Field - OU - Extended - Both Eyes; Future  (+) fHx- dad. IOP 15 OD, OS. Last IOP the same. c/d 0.55 OD, OS.   12/18/2023 DFE  7/22/2022 OCT WNL OU  Educated pt on findings w/understanding.   RTC 1 mo OCT/24-2 carlin faster. Will call w/results.     Dizziness  Occurs only when laying down and rotating to side position. ENT is aware. Longstanding. No ocular pathology found in association. Monitor.

## 2024-02-12 ENCOUNTER — TELEPHONE (OUTPATIENT)
Dept: RHEUMATOLOGY | Facility: CLINIC | Age: 64
End: 2024-02-12
Payer: COMMERCIAL

## 2024-03-08 ENCOUNTER — TELEPHONE (OUTPATIENT)
Dept: RHEUMATOLOGY | Facility: CLINIC | Age: 64
End: 2024-03-08
Payer: COMMERCIAL

## 2024-03-08 NOTE — TELEPHONE ENCOUNTER
----- Message from Linnea Zhao LPN sent at 3/8/2024 10:44 AM CST -----    ----- Message -----  From: Rika Daniels  Sent: 3/8/2024  10:39 AM CST  To: [facility] Staff    Type:  Patient Returning Call    Who Called: pt  Who Left Message for Patient: Isabelle Connelly MA  Does the patient know what this is regarding?:yes  Would the patient rather a call back or a response via [facility]? ca  Best Call Back Number: [phone]  Additional Information: pt said she is trying to get scheduled

## 2024-03-11 ENCOUNTER — CLINICAL SUPPORT (OUTPATIENT)
Dept: OPHTHALMOLOGY | Facility: CLINIC | Age: 64
End: 2024-03-11
Payer: COMMERCIAL

## 2024-03-11 ENCOUNTER — OFFICE VISIT (OUTPATIENT)
Dept: OPTOMETRY | Facility: CLINIC | Age: 64
End: 2024-03-11
Payer: COMMERCIAL

## 2024-03-11 DIAGNOSIS — H40.013 OAG (OPEN ANGLE GLAUCOMA) SUSPECT, LOW RISK, BILATERAL: ICD-10-CM

## 2024-03-11 DIAGNOSIS — H40.013 OAG (OPEN ANGLE GLAUCOMA) SUSPECT, LOW RISK, BILATERAL: Primary | ICD-10-CM

## 2024-03-11 DIAGNOSIS — H25.13 SENILE NUCLEAR SCLEROSIS, BILATERAL: ICD-10-CM

## 2024-03-11 DIAGNOSIS — H04.123 DRY EYE SYNDROME OF BOTH EYES: ICD-10-CM

## 2024-03-11 PROCEDURE — 99213 OFFICE O/P EST LOW 20 MIN: CPT | Mod: S$GLB,,, | Performed by: OPTOMETRIST

## 2024-03-11 PROCEDURE — 99999 PR PBB SHADOW E&M-EST. PATIENT-LVL II: CPT | Mod: PBBFAC,,, | Performed by: OPTOMETRIST

## 2024-03-11 NOTE — PROGRESS NOTES
HPI    TERESA: 12/23  Chief complaint (CC): Patient is here for a follow up with HVF,OCT and IOP   check today.  Patient hasn't noticed any vision changes since the last   exam.  Glasses? +2.50 OTC  Contacts? -  H/o eye surgery, injections or laser: -  H/o eye injury: -  Known eye conditions?  See above  Family h/o eye conditions? -  Eye gtts? AT's QID OU      (-) Flashes (-)  Floaters (-) Mucous   (-)  Tearing (-) Itching (-) Burning   (-) Headaches (-) Eye Pain/discomfort (-) Irritation   (-)  Redness (-) Double vision (-) Blurry vision    Diabetic? -  A1c? -      Last edited by Amy Serrato on 3/11/2024  1:20 PM.            Assessment /Plan     For exam results, see Encounter Report.    OAG (open angle glaucoma) suspect, low risk, bilateral    Senile nuclear sclerosis, bilateral    Dry eye syndrome of both eyes      (+) fHx- dad. IOP 15 OD, OS. Last IOP the same. c/d 0.55 OD, OS.   12/18/2023 DFE  3/11/2024 OCT WNL OU  3/11/2024 HVF WNL OU  No e/o glaucoma  Educated pt on findings w/understanding.   RTC 9 mo Routine

## 2024-04-18 ENCOUNTER — LAB VISIT (OUTPATIENT)
Dept: LAB | Facility: HOSPITAL | Age: 64
End: 2024-04-18
Payer: COMMERCIAL

## 2024-04-18 ENCOUNTER — OFFICE VISIT (OUTPATIENT)
Dept: RHEUMATOLOGY | Facility: CLINIC | Age: 64
End: 2024-04-18
Payer: COMMERCIAL

## 2024-04-18 VITALS
DIASTOLIC BLOOD PRESSURE: 74 MMHG | BODY MASS INDEX: 20.36 KG/M2 | WEIGHT: 97 LBS | HEIGHT: 58 IN | HEART RATE: 68 BPM | SYSTOLIC BLOOD PRESSURE: 119 MMHG

## 2024-04-18 DIAGNOSIS — M15.9 PRIMARY OSTEOARTHRITIS INVOLVING MULTIPLE JOINTS: ICD-10-CM

## 2024-04-18 DIAGNOSIS — R76.8 POSITIVE ANA (ANTINUCLEAR ANTIBODY): ICD-10-CM

## 2024-04-18 DIAGNOSIS — R76.8 POSITIVE ANA (ANTINUCLEAR ANTIBODY): Primary | ICD-10-CM

## 2024-04-18 DIAGNOSIS — G56.03 BILATERAL CARPAL TUNNEL SYNDROME: ICD-10-CM

## 2024-04-18 LAB
ABO GROUP BLD: NORMAL
DAT IGG-SP REAG RBC-IMP: NORMAL
RH BLD: NORMAL

## 2024-04-18 PROCEDURE — 86880 COOMBS TEST DIRECT: CPT | Performed by: STUDENT IN AN ORGANIZED HEALTH CARE EDUCATION/TRAINING PROGRAM

## 2024-04-18 PROCEDURE — 86901 BLOOD TYPING SEROLOGIC RH(D): CPT | Performed by: STUDENT IN AN ORGANIZED HEALTH CARE EDUCATION/TRAINING PROGRAM

## 2024-04-18 PROCEDURE — 86900 BLOOD TYPING SEROLOGIC ABO: CPT | Performed by: STUDENT IN AN ORGANIZED HEALTH CARE EDUCATION/TRAINING PROGRAM

## 2024-04-18 PROCEDURE — 85613 RUSSELL VIPER VENOM DILUTED: CPT | Performed by: STUDENT IN AN ORGANIZED HEALTH CARE EDUCATION/TRAINING PROGRAM

## 2024-04-18 PROCEDURE — 86146 BETA-2 GLYCOPROTEIN ANTIBODY: CPT | Mod: 59 | Performed by: STUDENT IN AN ORGANIZED HEALTH CARE EDUCATION/TRAINING PROGRAM

## 2024-04-18 PROCEDURE — 99215 OFFICE O/P EST HI 40 MIN: CPT | Mod: S$GLB,,, | Performed by: STUDENT IN AN ORGANIZED HEALTH CARE EDUCATION/TRAINING PROGRAM

## 2024-04-18 PROCEDURE — 99999 PR PBB SHADOW E&M-EST. PATIENT-LVL IV: CPT | Mod: PBBFAC,,, | Performed by: STUDENT IN AN ORGANIZED HEALTH CARE EDUCATION/TRAINING PROGRAM

## 2024-04-18 PROCEDURE — 86147 CARDIOLIPIN ANTIBODY EA IG: CPT | Performed by: STUDENT IN AN ORGANIZED HEALTH CARE EDUCATION/TRAINING PROGRAM

## 2024-04-18 NOTE — PROGRESS NOTES
Subjective:      Patient ID: Mercedes Perez is a 64 y.o. female.    Chief Complaint: positive ADELAIDA     Initial Presentation  Mercedes Perez is a 64 y.o. F with a past medical history of osteoporosis (on ibandronate), osteoarthritis of multiple joints and positive ADELAIDA who presents today for arthritis.    She previously followed with Dr. Young for positive ADELAIDA and OA. She initially presented to rheumatology for evaluation for positive ADELAIDA (1:160), negative titer which was checked by her PCP in the setting of joint pains. Other lupus work-up including RF, CCP, C3, C4, APLAs (except for indeterminate cardiolipin), UA, UPCR all unremarkable.    Interval History  Today she states that her biggest concern is the appearance of her fingers. 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 She feels like her arthritic changes in her fingers has continued to progress. She denies any pain today. Occasionally her hands will hurt at the end of the day. She denies swelling of the joints. She occasionally has cramping in her L arm and L leg that lasts for a few minutes. She denies alopecia, dry eyes, dry mouth, oral ulcers, rash.     Rheumatology ROS  (-) fevers, chills (-) weight loss, (-) fatigue, (-) morning stiffness,  (-) arthralgias, (-) arthritis, (-) headaches, (-)  vision changes or loss of vision, (-) hx of red eyes including uveitis, iritis, scleritis or episcleritis, (-)  photophobia, (-) dry eyes, (-) dry mouth, (-) rash, (-) photosensitivity, (-) alopecia, (-) mucosal ulcers, (-) Raynaud's  phenomenon, (-) SQ nodules, (-) sense of skin tightening in hands, face or torso, (-)  hx pleurisy, (-) sharp chest pains that increase with deep breath, (-) lung fibrosis, (-) hemoptysis, (-) hx of DVT or PE (-) chest pains, (-) shortness of breath, (-) hx pericarditis (-) abdominal pain, (-) nausea, (-) vomiting, (-) diarrhea, (-) constipation, (-) melena,  (-) bloody diarrhea, (-) UC/Crohns, (-) dysphagia, (-) GERD/Reflux, (-)  hematuria, proteinuria, (-) renal failure, (-) focal weakness, (-) trouble combing hair or (-) getting out of chairs,  (-) hx of low WBC, low platelets, anemia, (+) hx of pregnancy losses/pre term deliveries/pregnancy complications (1 first trimester miscarriage, 2 healthy pregnancies), (-) genital ulcers    History  Medical:  Active Problem List with Overview Notes    Diagnosis Date Noted    Positive ADELAIDA (antinuclear antibody) 09/13/2023    History of COVID-19 09/12/2022    Cystocele with uterine prolapse 11/01/2021    Presence of pessary 08/18/2021    Age-related osteoporosis without current pathological fracture 05/13/2019    Post-menopausal 05/10/2018    Pelvic floor weakness 05/04/2018    Vaginal atrophy 06/26/2016    Mixed incontinence urge and stress (male)(female) 06/26/2016    Urgency of urination 06/26/2016    Nocturia 06/26/2016    Pelvic organ prolapse quantification stage 2 cystocele 06/26/2016     Surgical:  Past Surgical History:   Procedure Laterality Date    COLONOSCOPY N/A 11/1/2021    Procedure: COLONOSCOPY;  Surgeon: Donnell Carson MD;  Location: [facility];  Service: Endoscopy;  Laterality: N/A;  covid test 10/1 [place], instructions mailed-Kpvt  Pt requests Monday AM cases  covid test 10/29/21, prep instr mailed and emailed -ml     Social: denies alcohol or tobacco use   Family: denies family history of autoimmune diseases including RA and SLE  Medication:  Current Outpatient Medications   Medication Sig Dispense Refill    calcium carbonate-vitamin D3 1,000 mg(2,500 mg)-800 unit Tab Take by mouth once daily.       ibandronate (BONIVA) 150 mg tablet Take 1 tablet (150 mg total) by mouth every 30 days. 3 tablet 6     Current Facility-Administered Medications   Medication Dose Route Frequency Provider Last Rate Last Admin    acetaminophen tablet 500 mg  500 mg Oral PRN Natasha Deng MD        heparin, porcine (PF) 100 unit/mL injection flush 500 Units  500 Units Intravenous PRN Ish  MD Natasha        sodium chloride 0.9% flush 10 mL  10 mL Intravenous PRN Natasha Deng MD           Imaging/Procedures  XR Hands Bilaterally (9/12/22):  FINDINGS:  Right hand: Osseous mineralization is preserved.  No acute displaced fractures.  No suspicious lytic or blastic lesions.  No osseous erosions.  There is mild asymmetric cartilage space narrowing at the 2nd DIP and 3rd PIP joints with associated bone productive changes and mild radial/ulnar subluxation, likely degenerative in nature.  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  No dislocation.  No significant soft tissue swelling.  No soft tissue calcifications or radiopaque foreign bodies.     Left hand: Osseous mineralization is preserved.  No acute displaced fractures.  Well corticated bone fragment at the dorsal aspect of the 5th middle phalanx base, presumably related to remote trauma.  No suspicious lytic or blastic lesions.  Mild 1st CMC cartilage space narrowing with associated bone productive changes, likely degenerative in nature.  Remaining cartilage spaces are preserved.  No subluxation or dislocation.  No significant soft tissue swelling.  No soft tissue calcifications or radiopaque foreign bodies.    Rheumatologic labs  Component      Latest Ref Rng 1/24/2023   PT Lupus Anticoagulant      12.0 - 15.5 sec 13.1    PTT Lupus Anticoagulant      32 - 48 sec 47    Thrombin Time      14.7 - 19.5 sec Not Applicable    Reptilase Time      <=21.9 sec Not Applicable    PTT Heparin Neutralized      32 - 48 sec Not Applicable    PTT-LA Mix      32 - 48 sec Not Applicable    PLATELET NEUTRALIZATION APTT      Negative  Not Applicable    DRVVT Screen      33 - 44 sec 33    dRVVT Incubated 1:1 Mix      33 - 44 sec Not Applicable    dRVVT Confirm      Negative ratio Not Applicable    Hex Phosph Neut Test      Negative  Not Applicable    Lupus Anticoagulant Interpretation  "See Note    Beta-2 Glyco 1 IgG      <=20 SGU <9    Beta-2 Glyco 1 IgM      <=20 SMU <9    Beta-2 Glyco 1 IgA      <=20 SANJAY <9    APA Isotype IgG      0.00 - 14.99 GPL <9.40    APA Isotype IgM      0.00 - 12.49 MPL 15.70 (H)       Component      Latest Ref [facility] 1/24/2023   Complement (C-4)      11 - 44 mg/dL 22    Complement (C-3)      50 - 180 mg/dL 101      Component      Latest Ref [facility] 1/24/2023   Protein, Serum      6.0 - 8.4 g/dL 8.2    Albumin grams/dl      3.35 - 5.55 g/dL 4.78    Alpha-1 grams/dl      0.17 - 0.41 g/dL 0.29    Alpha-2      0.43 - 0.99 g/dL 0.70    Beta      0.50 - 1.10 g/dL 0.99    Gamma      0.67 - 1.58 g/dL 1.44    IgG 1      382 - 929 mg/dL 844    IgG 2      242 - 700 mg/dL 261    IgG 3      22 - 176 mg/dL 105    IgG 4      4 - 86 mg/dL 97 (H)    IgG      650 - 1600 mg/dL 1390    IgA      40 - 350 mg/dL 465 (H)    IgM      50 - 300 mg/dL 171    Immunofix Interp. SEE COMMENT       Component      Latest Ref [facility] 1/24/2023   CRP      0.0 - 8.2 mg/L 6.0    Sed Rate      0 - 36 mm/Hr 13      Component      Latest Ref [facility] 9/30/2022   CCP Antibodies      <5.0 U/mL <0.5    Rheumatoid Factor      0.0 - 15.0 IU/mL <13.0      Component      Latest [facility] 9/30/2022   Anti Sm Antibody      0.00 - 0.99 Ratio 0.09    Anti-Sm Interpretation      Negative  Negative    Anti-SSA Antibody      0.00 - 0.99 Ratio 0.06    Anti-SSA Interpretation      Negative  Negative    Anti-SSB Antibody      0.00 - 0.99 Ratio 0.05    Anti-SSB Interpretation      Negative  Negative    ds DNA Ab      Negative 1:10  Negative 1:10    Anti Sm/RNP Antibody      0.00 - 0.99 Ratio 0.08    Anti-Sm/RNP Interpretation      Negative  Negative    ADELAIDA Screen      Negative <1:80  Positive !    ADELAIDA PATTERN 1 Homogeneous    ADELAIDA Titer 1 1:160       Objective:   /74   Pulse 68   Ht 4' 10" (1.473 m)   Wt 44 kg (97 lb)   BMI 20.27 kg/m²   Physical Exam   Constitutional: No distress.   HENT:   Mouth/Throat: Mucous membranes are moist. " "  Eyes: Conjunctivae are normal.   Cardiovascular: Normal rate, regular rhythm, normal heart sounds and normal pulses.   Pulmonary/Chest: Effort normal and breath sounds normal.   Abdominal: Soft. Bowel sounds are normal.   Musculoskeletal:         General: No swelling or tenderness. Normal range of motion.      Cervical back: Normal range of motion. No rigidity or tenderness.   Neurological: She is alert. She displays no weakness. Gait normal.   Skin: Skin is warm.      4/18/2024   Tender (BRAVO-28) 0 / 28    Swollen (BRAVO-28) 0 / 28    Provider Global --   Patient Global --   ESR --   CRP --   BRAVO-28 (ESR) --   BRAVO-28 (CRP) --   CDAI Score --     Assessment:     1. Positive ADELAIDA (antinuclear antibody)    2. Primary osteoarthritis involving multiple joints    3. Bilateral carpal tunnel syndrome      Plan:     Problem List Items Addressed This Visit          Immunology/Multi System    Positive ADELAIDA (antinuclear antibody) - Primary     Other Visit Diagnoses       Primary osteoarthritis involving multiple joints        Bilateral carpal tunnel syndrome              Mercedes Perez is a 64 y.o. F with a past medical history of osteoporosis (on ibandronate), osteoarthritis of multiple joints and positive ADELAIDA who presents today for arthritis.    Osteoarthritis- Explained to the patient that they have osteoarthritis, also synonymous with degenerative arthritis.  I explained to them, that this type of arthritis, the cartilage, which is a rubbery material that protect the joints, wears away, which can lead to joint pain, and bone spurs.  I told patient, this is the type of arthritis everyone gets, at some point in their life, and the severity can be mild to severe depending on the patient. "Wear and tear", previous joint/tendon/ligament injuries, extra weight, genetics, all play a role in osteoarthritis. I explained to the patient, that there are no medications that can reverse cartilage loss. I explained that maintaining a normal " weight, exercise, joint and muscle strengthening, are the best things to help stabilize arthritis.      Positive ADELAIDA- The patient does not fulfill criteria for systemic autoimmune disease. The patient does not have any clinical criteria for systemic lupus or other connective tissue disease. No signs of cutaneous lupus, no recurrent oral/nasal ulcers, no non scarring alopecia, no jaccouds arthropathy, no serositis (distinct episodes of pericarditis or pleuritis with effusions), no significant leukopenia/thrombocytopenia/hemolytic anemia, no glomerulonephritis, no raynaud's and pt has normal nailfold capillary examination.  No sclerodactyly. No inflammatory myopathy. No sicca symptoms.  Pt has no autoimmune CNS or PNS abnormalities.    Plan:  - Given indeterminate cardiolipin in the past, will repeat APLAs labs   - Recommend Tylenol up to 3000 mg per day if experiencing pain, Voltaren gel on affected areas if having pain in joints, paraffin wax baths for moist heat to ease pain and stiffness  - Discussed the importance of UV protection and healthy diet and exercise  - Recommended 20 minute of joint protective exercises daily, patient was counseled on the importance of joint protective exercises, examples of joint protective exercises include beginner Pilates, yoga, augie chi, light weights, resistance bands  - Nutrition counseling - eat mostly whole foods and anti-inflammatory diet; I discussed that it is incorporate fruits, vegetables, beans, lentils, flaxseed and Jone seeds, whole grains such as oats and quinoa on a daily basis and to avoid processed foods, and inflammatory animal products such as cow, pig, cheese.    This patient was examined with Dr. Hernandez. Plan discussed with the patient. Return to clinic as needed.    Faviola Arora MD  Rheumatology Fellow, PGY4

## 2024-04-18 NOTE — PATIENT INSTRUCTIONS
- Recommend Tylenol up to 3000 mg per day if you have any pain, Voltaren gel on affected areas if you have pain, paraffin wax baths for moist heat to ease pain and stiffness  - Recommend maintaining a normal weight, regular exercise and joint and muscle strengthening are the best things to help stabilize arthritis

## 2024-04-19 LAB
B2 GLYCOPROT1 IGG SERPL IA-ACNC: <9.4 SGU
B2 GLYCOPROT1 IGM SERPL IA-ACNC: <9.4 SMU

## 2024-04-23 LAB
CARDIOLIPIN IGG SER IA-ACNC: <9.4 GPL (ref 0–14.99)
CARDIOLIPIN IGM SER IA-ACNC: <9.4 MPL (ref 0–12.49)
CONFIRM DRVVT STA-STACLOT: NORMAL S
DRVVT SCREEN TO CONFIRM RATIO: NORMAL {RATIO}
HEPARIN NT PPP QL: NORMAL
LA 3 SCREEN W REFLEX-IMP: NORMAL
LMW HEPARIN IND PLT AB SER QL: NORMAL
MIXING DRVVT/NORMAL: NORMAL %
NEUTRALIZED DRVVT SCREEN RATIO: NORMAL
PROTHROMBIN TIME: 12.1 S (ref 12–15.5)
SCREEN APTT/NORMAL: 1.09
SCREEN APTT/NORMAL: NORMAL
SCREEN DRVVT/NORMAL: 0.79 %
THROMBIN TIME: NORMAL S

## 2024-09-19 ENCOUNTER — LAB VISIT (OUTPATIENT)
Dept: LAB | Facility: HOSPITAL | Age: 64
End: 2024-09-19
Attending: FAMILY MEDICINE
Payer: COMMERCIAL

## 2024-09-19 ENCOUNTER — OFFICE VISIT (OUTPATIENT)
Dept: PRIMARY CARE CLINIC | Facility: CLINIC | Age: 64
End: 2024-09-19
Payer: COMMERCIAL

## 2024-09-19 VITALS
DIASTOLIC BLOOD PRESSURE: 76 MMHG | BODY MASS INDEX: 20.45 KG/M2 | RESPIRATION RATE: 14 BRPM | HEART RATE: 64 BPM | SYSTOLIC BLOOD PRESSURE: 130 MMHG | HEIGHT: 58 IN | OXYGEN SATURATION: 96 % | WEIGHT: 97.44 LBS

## 2024-09-19 DIAGNOSIS — M81.0 AGE-RELATED OSTEOPOROSIS WITHOUT CURRENT PATHOLOGICAL FRACTURE: ICD-10-CM

## 2024-09-19 DIAGNOSIS — Z13.220 ENCOUNTER FOR LIPID SCREENING FOR CARDIOVASCULAR DISEASE: ICD-10-CM

## 2024-09-19 DIAGNOSIS — Z13.6 ENCOUNTER FOR LIPID SCREENING FOR CARDIOVASCULAR DISEASE: ICD-10-CM

## 2024-09-19 DIAGNOSIS — Z12.31 SCREENING MAMMOGRAM FOR BREAST CANCER: ICD-10-CM

## 2024-09-19 DIAGNOSIS — G44.89 OTHER HEADACHE SYNDROME: ICD-10-CM

## 2024-09-19 DIAGNOSIS — Z23 NEED FOR VACCINATION: ICD-10-CM

## 2024-09-19 DIAGNOSIS — Z78.0 POST-MENOPAUSAL: ICD-10-CM

## 2024-09-19 DIAGNOSIS — Z96.0 PRESENCE OF PESSARY: ICD-10-CM

## 2024-09-19 DIAGNOSIS — Z00.00 ANNUAL PHYSICAL EXAM: ICD-10-CM

## 2024-09-19 DIAGNOSIS — N39.46 MIXED INCONTINENCE URGE AND STRESS (MALE)(FEMALE): ICD-10-CM

## 2024-09-19 DIAGNOSIS — J34.89 SINUS PRESSURE: ICD-10-CM

## 2024-09-19 DIAGNOSIS — R76.8 POSITIVE ANA (ANTINUCLEAR ANTIBODY): ICD-10-CM

## 2024-09-19 DIAGNOSIS — Z00.00 ANNUAL PHYSICAL EXAM: Primary | ICD-10-CM

## 2024-09-19 LAB
25(OH)D3+25(OH)D2 SERPL-MCNC: 40 NG/ML (ref 30–96)
ALBUMIN SERPL BCP-MCNC: 4.3 G/DL (ref 3.5–5.2)
ALP SERPL-CCNC: 51 U/L (ref 55–135)
ALT SERPL W/O P-5'-P-CCNC: 23 U/L (ref 10–44)
ANION GAP SERPL CALC-SCNC: 9 MMOL/L (ref 8–16)
AST SERPL-CCNC: 28 U/L (ref 10–40)
BASOPHILS # BLD AUTO: 0.04 K/UL (ref 0–0.2)
BASOPHILS NFR BLD: 0.9 % (ref 0–1.9)
BILIRUB SERPL-MCNC: 0.7 MG/DL (ref 0.1–1)
BUN SERPL-MCNC: 16 MG/DL (ref 8–23)
CALCIUM SERPL-MCNC: 9.7 MG/DL (ref 8.7–10.5)
CHLORIDE SERPL-SCNC: 102 MMOL/L (ref 95–110)
CHOLEST SERPL-MCNC: 205 MG/DL (ref 120–199)
CHOLEST/HDLC SERPL: 3.1 {RATIO} (ref 2–5)
CO2 SERPL-SCNC: 24 MMOL/L (ref 23–29)
CREAT SERPL-MCNC: 0.7 MG/DL (ref 0.5–1.4)
DIFFERENTIAL METHOD BLD: ABNORMAL
EOSINOPHIL # BLD AUTO: 0.1 K/UL (ref 0–0.5)
EOSINOPHIL NFR BLD: 2 % (ref 0–8)
ERYTHROCYTE [DISTWIDTH] IN BLOOD BY AUTOMATED COUNT: 13.2 % (ref 11.5–14.5)
EST. GFR  (NO RACE VARIABLE): >60 ML/MIN/1.73 M^2
ESTIMATED AVG GLUCOSE: 114 MG/DL (ref 68–131)
GLUCOSE SERPL-MCNC: 100 MG/DL (ref 70–110)
HBA1C MFR BLD: 5.6 % (ref 4–5.6)
HCT VFR BLD AUTO: 42.4 % (ref 37–48.5)
HDLC SERPL-MCNC: 66 MG/DL (ref 40–75)
HDLC SERPL: 32.2 % (ref 20–50)
HGB BLD-MCNC: 14.2 G/DL (ref 12–16)
IMM GRANULOCYTES # BLD AUTO: 0.01 K/UL (ref 0–0.04)
IMM GRANULOCYTES NFR BLD AUTO: 0.2 % (ref 0–0.5)
LDLC SERPL CALC-MCNC: 116.4 MG/DL (ref 63–159)
LYMPHOCYTES # BLD AUTO: 2.2 K/UL (ref 1–4.8)
LYMPHOCYTES NFR BLD: 48.6 % (ref 18–48)
MCH RBC QN AUTO: 31.8 PG (ref 27–31)
MCHC RBC AUTO-ENTMCNC: 33.5 G/DL (ref 32–36)
MCV RBC AUTO: 95 FL (ref 82–98)
MONOCYTES # BLD AUTO: 0.3 K/UL (ref 0.3–1)
MONOCYTES NFR BLD: 5.6 % (ref 4–15)
NEUTROPHILS # BLD AUTO: 1.9 K/UL (ref 1.8–7.7)
NEUTROPHILS NFR BLD: 42.7 % (ref 38–73)
NONHDLC SERPL-MCNC: 139 MG/DL
NRBC BLD-RTO: 0 /100 WBC
PLATELET # BLD AUTO: 258 K/UL (ref 150–450)
PMV BLD AUTO: 9.7 FL (ref 9.2–12.9)
POTASSIUM SERPL-SCNC: 4.3 MMOL/L (ref 3.5–5.1)
PROT SERPL-MCNC: 7.8 G/DL (ref 6–8.4)
RBC # BLD AUTO: 4.46 M/UL (ref 4–5.4)
SODIUM SERPL-SCNC: 135 MMOL/L (ref 136–145)
TRIGL SERPL-MCNC: 113 MG/DL (ref 30–150)
TSH SERPL DL<=0.005 MIU/L-ACNC: 1.92 UIU/ML (ref 0.4–4)
WBC # BLD AUTO: 4.49 K/UL (ref 3.9–12.7)

## 2024-09-19 PROCEDURE — 80053 COMPREHEN METABOLIC PANEL: CPT | Performed by: FAMILY MEDICINE

## 2024-09-19 PROCEDURE — 83036 HEMOGLOBIN GLYCOSYLATED A1C: CPT | Performed by: FAMILY MEDICINE

## 2024-09-19 PROCEDURE — 82306 VITAMIN D 25 HYDROXY: CPT | Performed by: FAMILY MEDICINE

## 2024-09-19 PROCEDURE — 99999 PR PBB SHADOW E&M-EST. PATIENT-LVL III: CPT | Mod: PBBFAC,,, | Performed by: FAMILY MEDICINE

## 2024-09-19 PROCEDURE — 84443 ASSAY THYROID STIM HORMONE: CPT | Performed by: FAMILY MEDICINE

## 2024-09-19 PROCEDURE — 85025 COMPLETE CBC W/AUTO DIFF WBC: CPT | Performed by: FAMILY MEDICINE

## 2024-09-19 PROCEDURE — 80061 LIPID PANEL: CPT | Performed by: FAMILY MEDICINE

## 2024-09-19 RX ORDER — FLUTICASONE PROPIONATE 50 MCG
1 SPRAY, SUSPENSION (ML) NASAL DAILY
Qty: 16 G | Refills: 3 | Status: SHIPPED | OUTPATIENT
Start: 2024-09-19

## 2024-09-19 NOTE — PROGRESS NOTES
"Subjective:       Patient ID: Mercedes Perez is a 64 y.o. female.    Chief Complaint: Annual Exam    HPI  65 y/o female with Osteoporosis, Mixed incontinence, Positive ADELAIDA, hx of Covid is here for her annual exam.       She is feeling good, she notes daily she gets heavy feeling over eye brows for the past 9 months, not getting worse, can last a few seconds and resolves, pain is 2-3/10, occurs 4-5 times a day, occurs mainly in the afternoon when she is relaxing more, she was seen by eye doctor and was told her eyes were ok. She notes she sneezes easily, she denies nasal congestion/running nose/pnd, she does feel some dryness in her throat that it makes her cough, she denies heartburn/n/v, has occasional diarrhea depending on what she eats, she denies constipation/cp/sob, she has pessary in place but its been leaking lately. She is eating healthy. She is walking 30 min daily "walk at home video". Sleeping fair.     Takes very occasional Zyrtec for runny nose, and occasionally take Tylenol last use over a month ago    Home bp 120 or less/70-80      Hx of Covid-19 11/2020 mild asymptomatic was tested because of exposure, 2021  Vestibular migraines: Dr. Noyola prn  Positive ADELAIDA: following with rheum prn only  Mixed incontinence/cystocele: following with Urogyn, she tried ditropan but it was not helpful, topical estrogen cream not helpful, Pessary in place  Osteoporosis: she is taking 1200 mg of Calcium and 1000 IU D3, did not tolerate Reclast she had flu like sx,Dexa 10/2022, Boniva 150 mg tablet monthly  Eye exam utd Dr. Villalta  Dental utd  GYN: following with Dr. Boyle, pelvic due, mmg 12/2023  Colonoscopy 11/2021 repeat 10 years     Review of Systems  see HPI  Objective:      /76 (BP Location: Left arm, Patient Position: Sitting, BP Method: Medium (Manual))   Pulse 64   Resp 14   Ht 4' 10" (1.473 m)   Wt 44.2 kg (97 lb 7.1 oz)   SpO2 96%   BMI 20.37 kg/m²   Physical Exam  Vitals and nursing note reviewed. "   Constitutional:       Appearance: She is well-developed.   HENT:      Head: Normocephalic and atraumatic.      Right Ear: Tympanic membrane normal.      Left Ear: Tympanic membrane normal.      Nose: Nose normal.      Mouth/Throat:      Pharynx: No oropharyngeal exudate or posterior oropharyngeal erythema.   Eyes:      Pupils: Pupils are equal, round, and reactive to light.   Neck:      Thyroid: No thyromegaly.   Cardiovascular:      Rate and Rhythm: Normal rate and regular rhythm.      Heart sounds: Normal heart sounds.   Pulmonary:      Effort: Pulmonary effort is normal. No respiratory distress.      Breath sounds: Normal breath sounds.   Abdominal:      General: Bowel sounds are normal. There is no distension.      Palpations: Abdomen is soft. There is no mass.      Tenderness: There is no abdominal tenderness.   Musculoskeletal:      Cervical back: Normal range of motion and neck supple.      Right lower leg: No edema.      Left lower leg: No edema.   Lymphadenopathy:      Cervical: No cervical adenopathy.   Skin:     General: Skin is warm and dry.   Neurological:      General: No focal deficit present.      Mental Status: She is alert.         Assessment:       1. Annual physical exam    2. Age-related osteoporosis without current pathological fracture    3. Post-menopausal    4. Encounter for lipid screening for cardiovascular disease    5. Positive ADELAIDA (antinuclear antibody)    6. Presence of pessary    7. Mixed incontinence urge and stress (male)(female)    8. Screening mammogram for breast cancer    9. Sinus pressure    10. Other headache syndrome        Plan:   Mercedes was seen today for annual exam.    Diagnoses and all orders for this visit:    Annual physical exam  -     CBC Auto Differential; Future  -     Comprehensive Metabolic Panel; Future  -     Hemoglobin A1C; Future  -     Lipid Panel; Future  -     Vitamin D; Future  -     TSH; Future    Age-related osteoporosis without current pathological  fracture  -     DXA Bone Density Axial Skeleton 1 or more sites; Future  -     Comprehensive Metabolic Panel; Future  -     Vitamin D; Future    Post-menopausal  -     DXA Bone Density Axial Skeleton 1 or more sites; Future  -     Comprehensive Metabolic Panel; Future  -     Vitamin D; Future    Encounter for lipid screening for cardiovascular disease  -     Lipid Panel; Future    Positive ADELAIDA (antinuclear antibody)    Presence of pessary    Mixed incontinence urge and stress (male)(female)    Screening mammogram for breast cancer  -     Mammo Digital Screening Bilat w/ Humberto; Future    Sinus pressure  -     fluticasone propionate (FLONASE) 50 mcg/actuation nasal spray; 1 spray (50 mcg total) by Each Nostril route once daily.  -     CT Head Without Contrast; Future    Other headache syndrome  -     CT Head Without Contrast; Future

## 2024-09-25 ENCOUNTER — HOSPITAL ENCOUNTER (OUTPATIENT)
Dept: RADIOLOGY | Facility: HOSPITAL | Age: 64
Discharge: HOME | End: 2024-09-25
Attending: FAMILY MEDICINE
Payer: COMMERCIAL

## 2024-09-25 DIAGNOSIS — G44.89 OTHER HEADACHE SYNDROME: ICD-10-CM

## 2024-09-25 DIAGNOSIS — J34.89 SINUS PRESSURE: ICD-10-CM

## 2024-09-25 PROCEDURE — 70450 CT HEAD/BRAIN W/O DYE: CPT | Mod: TC

## 2024-09-25 PROCEDURE — 70450 CT HEAD/BRAIN W/O DYE: CPT | Mod: 26,,, | Performed by: RADIOLOGY

## 2024-10-11 ENCOUNTER — HOSPITAL ENCOUNTER (OUTPATIENT)
Dept: RADIOLOGY | Facility: CLINIC | Age: 64
Discharge: HOME | End: 2024-10-11
Attending: FAMILY MEDICINE
Payer: COMMERCIAL

## 2024-10-11 DIAGNOSIS — Z78.0 POST-MENOPAUSAL: ICD-10-CM

## 2024-10-11 DIAGNOSIS — M81.0 AGE-RELATED OSTEOPOROSIS WITHOUT CURRENT PATHOLOGICAL FRACTURE: ICD-10-CM

## 2024-10-11 PROCEDURE — 77080 DXA BONE DENSITY AXIAL: CPT | Mod: TC,PO

## 2024-10-11 PROCEDURE — 77080 DXA BONE DENSITY AXIAL: CPT | Mod: 26,,, | Performed by: INTERNAL MEDICINE

## 2024-10-25 ENCOUNTER — TELEPHONE (OUTPATIENT)
Dept: PRIMARY CARE CLINIC | Facility: CLINIC | Age: 64
End: 2024-10-25
Payer: COMMERCIAL

## 2024-11-07 ENCOUNTER — TELEPHONE (OUTPATIENT)
Dept: PRIMARY CARE CLINIC | Facility: CLINIC | Age: 64
End: 2024-11-07
Payer: COMMERCIAL

## 2024-11-07 NOTE — TELEPHONE ENCOUNTER
Left message for pt to return call re: insurance coverage. Infusion dept trying to get her scheduled for Prolia injection. Can't verify coverage.

## 2024-12-05 ENCOUNTER — OFFICE VISIT (OUTPATIENT)
Dept: OPTOMETRY | Facility: CLINIC | Age: 64
End: 2024-12-05
Payer: COMMERCIAL

## 2024-12-05 DIAGNOSIS — H53.19 PHOTOPSIA OF LEFT EYE: Primary | ICD-10-CM

## 2024-12-05 PROCEDURE — 99999 PR PBB SHADOW E&M-EST. PATIENT-LVL II: CPT | Mod: PBBFAC,,, | Performed by: OPTOMETRIST

## 2024-12-05 PROCEDURE — 92014 COMPRE OPH EXAM EST PT 1/>: CPT | Mod: S$GLB,,, | Performed by: OPTOMETRIST

## 2024-12-05 NOTE — PROGRESS NOTES
Subjective:       Patient ID: Mercedes Perez is a 64 y.o. female      Chief Complaint   Patient presents with    Eye Problem     History of Present Illness  Dls: 3/11/24 Dr. Villalta    63 y/o female presents today with c/o x 4-5 months floaters os flashes os off/on ha's off/on no pain no changes in vision. Pt states light flashes are sporadic.     Eye meds  Otc gtts ou prn          Assessment/Plan:     1. Photopsia of left eye (Primary)  Pt states x 4-5 months on/off flashes and floaters OS. No changes in vision. DFE with no H/T/D. Possibly impending PVD. Discussed causes of flashes and floaters with the patient and described the warnings of a possible retinal detachment. RTC if any changes.

## 2024-12-06 DIAGNOSIS — M81.0 AGE-RELATED OSTEOPOROSIS WITHOUT CURRENT PATHOLOGICAL FRACTURE: ICD-10-CM

## 2024-12-06 RX ORDER — IBANDRONATE SODIUM 150 MG/1
150 TABLET, FILM COATED ORAL
Qty: 3 TABLET | Refills: 6 | Status: SHIPPED | OUTPATIENT
Start: 2024-12-06

## 2024-12-06 NOTE — TELEPHONE ENCOUNTER
Care Due:                  Date            Visit Type   Department     Provider  --------------------------------------------------------------------------------                                EP -                              PRIMARY      [facility] Primary  Last Visit: 09-      CARE ([facility])   Care           Natasha Deng  Next Visit: None Scheduled  None         None Found                                                            Last  Test          Frequency    Reason                     Performed    Due Date  --------------------------------------------------------------------------------    Mg Level....  12 months..  ibandronate..............  Not Found    Overdue    Phosphate...  12 months..  ibandronate..............  Not Found    Overdue    [employer] Embedded Care Due Messages. Reference number: [id number].   12/06/2024 12:54:20 AM CST

## 2024-12-11 ENCOUNTER — HOSPITAL ENCOUNTER (OUTPATIENT)
Dept: RADIOLOGY | Facility: HOSPITAL | Age: 64
Discharge: HOME | End: 2024-12-11
Attending: FAMILY MEDICINE
Payer: COMMERCIAL

## 2024-12-11 DIAGNOSIS — Z12.31 SCREENING MAMMOGRAM FOR BREAST CANCER: ICD-10-CM

## 2024-12-11 PROCEDURE — 77063 BREAST TOMOSYNTHESIS BI: CPT | Mod: TC,PO

## 2024-12-11 PROCEDURE — 77063 BREAST TOMOSYNTHESIS BI: CPT | Mod: 26,,, | Performed by: RADIOLOGY

## 2024-12-11 PROCEDURE — 77067 SCR MAMMO BI INCL CAD: CPT | Mod: 26,,, | Performed by: RADIOLOGY

## 2025-03-24 ENCOUNTER — INFUSION (OUTPATIENT)
Dept: INFECTIOUS DISEASES | Facility: HOSPITAL | Age: 65
End: 2025-03-24
Payer: COMMERCIAL

## 2025-03-24 VITALS
DIASTOLIC BLOOD PRESSURE: 73 MMHG | WEIGHT: 98.56 LBS | BODY MASS INDEX: 20.69 KG/M2 | HEART RATE: 74 BPM | HEIGHT: 58 IN | RESPIRATION RATE: 19 BRPM | SYSTOLIC BLOOD PRESSURE: 130 MMHG | OXYGEN SATURATION: 99 % | TEMPERATURE: 98 F

## 2025-03-24 DIAGNOSIS — M81.0 AGE-RELATED OSTEOPOROSIS WITHOUT CURRENT PATHOLOGICAL FRACTURE: Primary | ICD-10-CM

## 2025-03-24 PROCEDURE — 96372 THER/PROPH/DIAG INJ SC/IM: CPT

## 2025-03-24 PROCEDURE — 63600175 PHARM REV CODE 636 W HCPCS: Mod: JZ,TB | Performed by: FAMILY MEDICINE

## 2025-03-24 RX ADMIN — DENOSUMAB 60 MG: 60 INJECTION SUBCUTANEOUS at 10:03

## 2025-03-24 NOTE — PROGRESS NOTES
Patient arrives for Prolia injection - confirms use of calcium and vitamin D supplements and denies dental procedures over past 3 months - administered per guidelines.   Observed on unit for 15 minutes post injection.  Tolerated well. Next appt scheduled, pt made aware.    Limited head-to-toe assessment due to privacy issues and visit reason though the opportunity was given for patient to express any concerns

## 2025-05-30 ENCOUNTER — TELEPHONE (OUTPATIENT)
Dept: PRIMARY CARE CLINIC | Facility: CLINIC | Age: 65
End: 2025-05-30
Payer: COMMERCIAL

## 2025-05-30 DIAGNOSIS — Z78.0 POST-MENOPAUSAL: ICD-10-CM

## 2025-05-30 DIAGNOSIS — Z00.00 ANNUAL PHYSICAL EXAM: Primary | ICD-10-CM

## 2025-05-30 DIAGNOSIS — M81.0 AGE-RELATED OSTEOPOROSIS WITHOUT CURRENT PATHOLOGICAL FRACTURE: ICD-10-CM

## 2025-05-30 DIAGNOSIS — Z13.220 ENCOUNTER FOR LIPID SCREENING FOR CARDIOVASCULAR DISEASE: ICD-10-CM

## 2025-05-30 DIAGNOSIS — Z13.6 ENCOUNTER FOR LIPID SCREENING FOR CARDIOVASCULAR DISEASE: ICD-10-CM

## 2025-05-30 NOTE — TELEPHONE ENCOUNTER
----- Message from Angely sent at 5/30/2025  8:46 AM CDT -----  Contact: [phone] Patient  type: Leon is requesting to schedule their Lab appointment prior to an appointment.Order is not listed in EPIC.  Please enter order and contact patient to schedule.Preferred Date and Time of Labs:09/15/2025 MorningDate of Appointment:09/22/2025Where would they like the lab performed?[facility] LabWould the patient rather a call back or a response via [facility]? Call Back Please. Thank you